# Patient Record
Sex: MALE | Race: WHITE | NOT HISPANIC OR LATINO
[De-identification: names, ages, dates, MRNs, and addresses within clinical notes are randomized per-mention and may not be internally consistent; named-entity substitution may affect disease eponyms.]

---

## 2017-01-17 ENCOUNTER — APPOINTMENT (OUTPATIENT)
Dept: GASTROENTEROLOGY | Facility: HOSPITAL | Age: 62
End: 2017-01-17

## 2017-01-17 ENCOUNTER — OUTPATIENT (OUTPATIENT)
Dept: OUTPATIENT SERVICES | Facility: HOSPITAL | Age: 62
LOS: 1 days | End: 2017-01-17
Payer: SELF-PAY

## 2017-01-17 VITALS
HEIGHT: 66 IN | HEART RATE: 58 BPM | RESPIRATION RATE: 14 BRPM | WEIGHT: 170 LBS | DIASTOLIC BLOOD PRESSURE: 89 MMHG | BODY MASS INDEX: 27.32 KG/M2 | SYSTOLIC BLOOD PRESSURE: 137 MMHG

## 2017-01-17 DIAGNOSIS — B18.1 CHRONIC VIRAL HEPATITIS B WITHOUT DELTA-AGENT: ICD-10-CM

## 2017-01-17 DIAGNOSIS — I85.00 ESOPHAGEAL VARICES WITHOUT BLEEDING: ICD-10-CM

## 2017-01-17 DIAGNOSIS — K21.9 GASTRO-ESOPHAGEAL REFLUX DISEASE WITHOUT ESOPHAGITIS: ICD-10-CM

## 2017-01-17 DIAGNOSIS — K74.60 UNSPECIFIED CIRRHOSIS OF LIVER: ICD-10-CM

## 2017-01-17 DIAGNOSIS — R10.9 UNSPECIFIED ABDOMINAL PAIN: ICD-10-CM

## 2017-01-17 PROCEDURE — G0463: CPT

## 2017-06-27 ENCOUNTER — OUTPATIENT (OUTPATIENT)
Dept: OUTPATIENT SERVICES | Facility: HOSPITAL | Age: 62
LOS: 1 days | End: 2017-06-27
Payer: SELF-PAY

## 2017-06-27 ENCOUNTER — APPOINTMENT (OUTPATIENT)
Dept: GASTROENTEROLOGY | Facility: HOSPITAL | Age: 62
End: 2017-06-27

## 2017-06-27 VITALS
WEIGHT: 170 LBS | HEIGHT: 66 IN | BODY MASS INDEX: 27.32 KG/M2 | DIASTOLIC BLOOD PRESSURE: 89 MMHG | HEART RATE: 52 BPM | SYSTOLIC BLOOD PRESSURE: 139 MMHG

## 2017-06-27 DIAGNOSIS — Z80.0 FAMILY HISTORY OF MALIGNANT NEOPLASM OF DIGESTIVE ORGANS: ICD-10-CM

## 2017-06-27 DIAGNOSIS — K76.9 LIVER DISEASE, UNSPECIFIED: ICD-10-CM

## 2017-06-27 PROCEDURE — G0463: CPT

## 2017-06-28 DIAGNOSIS — B18.1 CHRONIC VIRAL HEPATITIS B WITHOUT DELTA-AGENT: ICD-10-CM

## 2017-06-28 DIAGNOSIS — K74.60 UNSPECIFIED CIRRHOSIS OF LIVER: ICD-10-CM

## 2017-06-28 DIAGNOSIS — I85.00 ESOPHAGEAL VARICES WITHOUT BLEEDING: ICD-10-CM

## 2017-06-28 DIAGNOSIS — E66.3 OVERWEIGHT: ICD-10-CM

## 2017-10-12 ENCOUNTER — TRANSCRIPTION ENCOUNTER (OUTPATIENT)
Age: 62
End: 2017-10-12

## 2017-11-06 ENCOUNTER — MEDICATION RENEWAL (OUTPATIENT)
Age: 62
End: 2017-11-06

## 2017-11-28 ENCOUNTER — APPOINTMENT (OUTPATIENT)
Dept: INTERNAL MEDICINE | Facility: CLINIC | Age: 62
End: 2017-11-28
Payer: COMMERCIAL

## 2017-11-28 VITALS
OXYGEN SATURATION: 98 % | HEIGHT: 68 IN | BODY MASS INDEX: 26.07 KG/M2 | HEART RATE: 94 BPM | WEIGHT: 172 LBS | SYSTOLIC BLOOD PRESSURE: 140 MMHG | DIASTOLIC BLOOD PRESSURE: 80 MMHG

## 2017-11-28 DIAGNOSIS — M54.5 LOW BACK PAIN: ICD-10-CM

## 2017-11-28 DIAGNOSIS — M79.673 PAIN IN UNSPECIFIED FOOT: ICD-10-CM

## 2017-11-28 DIAGNOSIS — G89.29 LOW BACK PAIN: ICD-10-CM

## 2017-11-28 DIAGNOSIS — Z87.898 PERSONAL HISTORY OF OTHER SPECIFIED CONDITIONS: ICD-10-CM

## 2017-11-28 PROCEDURE — G0008: CPT

## 2017-11-28 PROCEDURE — 90688 IIV4 VACCINE SPLT 0.5 ML IM: CPT

## 2017-11-28 PROCEDURE — 99396 PREV VISIT EST AGE 40-64: CPT | Mod: 25

## 2017-12-01 LAB
25(OH)D3 SERPL-MCNC: 25.9 NG/ML
ALP BLD-CCNC: 53 U/L
ALT SERPL-CCNC: 49 U/L
ANION GAP SERPL CALC-SCNC: 13 MMOL/L
APPEARANCE: CLEAR
AST SERPL-CCNC: 30 U/L
BASOPHILS # BLD AUTO: 0.01 K/UL
BASOPHILS NFR BLD AUTO: 0.2 %
BILIRUB SERPL-MCNC: 0.8 MG/DL
BILIRUBIN URINE: NEGATIVE
BLOOD URINE: NEGATIVE
BUN SERPL-MCNC: 25 MG/DL
CALCIUM SERPL-MCNC: 10.3 MG/DL
CHOLEST SERPL-MCNC: 178 MG/DL
CHOLEST/HDLC SERPL: 4 RATIO
CO2 SERPL-SCNC: 26 MMOL/L
COLOR: YELLOW
CREAT SERPL-MCNC: 1.07 MG/DL
EOSINOPHIL # BLD AUTO: 0.2 K/UL
EOSINOPHIL NFR BLD AUTO: 3.8 %
GLUCOSE QUALITATIVE U: NEGATIVE MG/DL
GLUCOSE SERPL-MCNC: 107 MG/DL
HCT VFR BLD CALC: 45.5 %
HDLC SERPL-MCNC: 44 MG/DL
HGB BLD-MCNC: 15.2 G/DL
HIV1+2 AB SPEC QL IA.RAPID: NONREACTIVE
IMM GRANULOCYTES NFR BLD AUTO: 0.2 %
KETONES URINE: NEGATIVE
LDLC SERPL CALC-MCNC: 106 MG/DL
LEUKOCYTE ESTERASE URINE: NEGATIVE
LYMPHOCYTES # BLD AUTO: 1.74 K/UL
LYMPHOCYTES NFR BLD AUTO: 33.1 %
MAN DIFF?: NORMAL
MCHC RBC-ENTMCNC: 30 PG
MCHC RBC-ENTMCNC: 33.4 GM/DL
MCV RBC AUTO: 89.9 FL
MONOCYTES # BLD AUTO: 0.46 K/UL
MONOCYTES NFR BLD AUTO: 8.7 %
NEUTROPHILS # BLD AUTO: 2.84 K/UL
NEUTROPHILS NFR BLD AUTO: 54 %
NITRITE URINE: NEGATIVE
PH URINE: 5
PLATELET # BLD AUTO: 122 K/UL
POTASSIUM SERPL-SCNC: 4.4 MMOL/L
PROT SERPL-MCNC: 7.7 G/DL
PROTEIN URINE: NEGATIVE MG/DL
PSA SERPL-MCNC: 0.93 NG/ML
RBC # BLD: 5.06 M/UL
RBC # FLD: 14.3 %
SODIUM SERPL-SCNC: 140 MMOL/L
SPECIFIC GRAVITY URINE: 1.02
T PALLIDUM AB SER QL IA: NEGATIVE
TRIGL SERPL-MCNC: 139 MG/DL
TSH SERPL-ACNC: 1.86 UIU/ML
UROBILINOGEN URINE: NEGATIVE MG/DL
WBC # FLD AUTO: 5.26 K/UL

## 2018-01-04 ENCOUNTER — APPOINTMENT (OUTPATIENT)
Dept: DERMATOLOGY | Facility: CLINIC | Age: 63
End: 2018-01-04

## 2018-01-23 ENCOUNTER — APPOINTMENT (OUTPATIENT)
Dept: GASTROENTEROLOGY | Facility: CLINIC | Age: 63
End: 2018-01-23
Payer: COMMERCIAL

## 2018-01-23 VITALS
TEMPERATURE: 97.7 F | RESPIRATION RATE: 14 BRPM | SYSTOLIC BLOOD PRESSURE: 118 MMHG | BODY MASS INDEX: 26.98 KG/M2 | HEIGHT: 68 IN | OXYGEN SATURATION: 98 % | HEART RATE: 60 BPM | DIASTOLIC BLOOD PRESSURE: 74 MMHG | WEIGHT: 178 LBS

## 2018-01-23 PROCEDURE — 99204 OFFICE O/P NEW MOD 45 MIN: CPT | Mod: 25

## 2018-01-23 PROCEDURE — 36415 COLL VENOUS BLD VENIPUNCTURE: CPT

## 2018-01-28 LAB
AFP-TM SERPL-MCNC: 2.3 NG/ML
ALBUMIN SERPL ELPH-MCNC: 4.4 G/DL
ALP BLD-CCNC: 55 U/L
ALT SERPL-CCNC: 36 U/L
AST SERPL-CCNC: 33 U/L
BASOPHILS # BLD AUTO: 0.01 K/UL
BASOPHILS NFR BLD AUTO: 0.2 %
BILIRUB DIRECT SERPL-MCNC: 0.1 MG/DL
BILIRUB INDIRECT SERPL-MCNC: 0.3 MG/DL
BILIRUB SERPL-MCNC: 0.4 MG/DL
CREAT SERPL-MCNC: 1.22 MG/DL
EOSINOPHIL # BLD AUTO: 0.2 K/UL
EOSINOPHIL NFR BLD AUTO: 3.6 %
HBV DNA # SERPL NAA+PROBE: NOT DETECTED IU/ML
HCT VFR BLD CALC: 46.3 %
HEPB DNA PCR LOG: NOT DETECTED LOGIU/ML
HGB BLD-MCNC: 15 G/DL
IMM GRANULOCYTES NFR BLD AUTO: 0.2 %
INR PPP: 1 RATIO
LYMPHOCYTES # BLD AUTO: 2.01 K/UL
LYMPHOCYTES NFR BLD AUTO: 36.3 %
MAN DIFF?: NORMAL
MCHC RBC-ENTMCNC: 29.8 PG
MCHC RBC-ENTMCNC: 32.4 GM/DL
MCV RBC AUTO: 91.9 FL
MONOCYTES # BLD AUTO: 0.44 K/UL
MONOCYTES NFR BLD AUTO: 8 %
NEUTROPHILS # BLD AUTO: 2.86 K/UL
NEUTROPHILS NFR BLD AUTO: 51.7 %
PLATELET # BLD AUTO: 127 K/UL
PROT SERPL-MCNC: 7.6 G/DL
PT BLD: 11.3 SEC
RBC # BLD: 5.04 M/UL
RBC # FLD: 14.2 %
SODIUM SERPL-SCNC: 140 MMOL/L
WBC # FLD AUTO: 5.53 K/UL

## 2018-02-08 ENCOUNTER — APPOINTMENT (OUTPATIENT)
Dept: PULMONOLOGY | Facility: CLINIC | Age: 63
End: 2018-02-08

## 2018-03-19 ENCOUNTER — RX RENEWAL (OUTPATIENT)
Age: 63
End: 2018-03-19

## 2018-07-27 PROBLEM — Z80.0 FAMILY HISTORY OF COLON CANCER: Status: INACTIVE | Noted: 2017-06-27

## 2018-10-31 ENCOUNTER — APPOINTMENT (OUTPATIENT)
Dept: INTERNAL MEDICINE | Facility: CLINIC | Age: 63
End: 2018-10-31
Payer: COMMERCIAL

## 2018-10-31 VITALS
BODY MASS INDEX: 28.09 KG/M2 | HEIGHT: 67 IN | WEIGHT: 179 LBS | OXYGEN SATURATION: 98 % | SYSTOLIC BLOOD PRESSURE: 114 MMHG | DIASTOLIC BLOOD PRESSURE: 80 MMHG

## 2018-10-31 PROCEDURE — G0009: CPT

## 2018-10-31 PROCEDURE — 99397 PER PM REEVAL EST PAT 65+ YR: CPT | Mod: 25

## 2018-10-31 PROCEDURE — 99213 OFFICE O/P EST LOW 20 MIN: CPT | Mod: 25

## 2018-10-31 PROCEDURE — G0444 DEPRESSION SCREEN ANNUAL: CPT

## 2018-10-31 PROCEDURE — 90670 PCV13 VACCINE IM: CPT

## 2018-10-31 NOTE — HISTORY OF PRESENT ILLNESS
[FreeTextEntry1] : CPE [de-identified] : \par Pt would like a CPE and ask for Ophthalmology and ENT.\par \par Patient also hurt his right wrist working last week. He has pain around the thumb.\par \par

## 2018-10-31 NOTE — PHYSICAL EXAM
[No Acute Distress] : no acute distress [Well Developed] : well developed [Well-Appearing] : well-appearing [Normal Sclera/Conjunctiva] : normal sclera/conjunctiva [PERRL] : pupils equal round and reactive to light [EOMI] : extraocular movements intact [Normal Oropharynx] : the oropharynx was normal [Supple] : supple [No Lymphadenopathy] : no lymphadenopathy [No Respiratory Distress] : no respiratory distress  [Clear to Auscultation] : lungs were clear to auscultation bilaterally [No Accessory Muscle Use] : no accessory muscle use [Normal Rate] : normal rate  [Regular Rhythm] : with a regular rhythm [Normal S1, S2] : normal S1 and S2 [No Murmur] : no murmur heard [No Carotid Bruits] : no carotid bruits [No Edema] : there was no peripheral edema [Normal Appearance] : normal in appearance [No Masses] : no palpable masses [No Nipple Discharge] : no nipple discharge [No Axillary Lymphadenopathy] : no axillary lymphadenopathy [Soft] : abdomen soft [Non Tender] : non-tender [No HSM] : no HSM [Normal Bowel Sounds] : normal bowel sounds [Normal Supraclavicular Nodes] : no supraclavicular lymphadenopathy [Normal Axillary Nodes] : no axillary lymphadenopathy [Normal Posterior Cervical Nodes] : no posterior cervical lymphadenopathy [Normal Anterior Cervical Nodes] : no anterior cervical lymphadenopathy [No Spinal Tenderness] : no spinal tenderness [No Joint Swelling] : no joint swelling [No Focal Deficits] : no focal deficits [Speech Grossly Normal] : speech grossly normal [Normal Affect] : the affect was normal [Alert and Oriented x3] : oriented to person, place, and time [Normal Mood] : the mood was normal [Normal Insight/Judgement] : insight and judgment were intact [Acne] : no acne [de-identified] : No calf tenderness bilaterally. Radial pulses +2 bilaterally  [de-identified] : no pain to palpation over the lower back where pt has pain [de-identified] : RIGHT wrist, normal ROM and strength, no joint swelling or erythema noted [de-identified] : no skin changes on the back, mild excoriation on right thumb which is not infected, right wrist bandaged here

## 2018-10-31 NOTE — ASSESSMENT
[FreeTextEntry1] : # RIGHT wrist sprain and chronic low back pain - advised pt he is overworking and should try to cut back on his work reasonably. advised power walking to improve core strength and provided pt with a wrist splint today for PRN use\par \par HCM - preventive topics d/w pt\par -check screening labs\par -pt did his flu vaccine already and is due for Prevnar which he is agreeable to\par \par Advised the patient today to return to the office within 6 months or sooner as needed for the next visit and that the patient may see any doctor here if I am unavailable for any reason.\par

## 2018-10-31 NOTE — REVIEW OF SYSTEMS
[Back Pain] : back pain [Fever] : no fever [Pain] : no pain [Chest Pain] : no chest pain [Dyspnea on Exertion] : not dyspnea on exertion [Abdominal Pain] : no abdominal pain [Melena] : no melena [Dysuria] : no dysuria [Hematuria] : no hematuria [Skin Rash] : no skin rash [Fainting] : no fainting [Depression] : no depression [Easy Bleeding] : no easy bleeding [FreeTextEntry8] : no urinary symptoms

## 2018-10-31 NOTE — COUNSELING
[Weight management counseling provided] : Weight management [Healthy eating counseling provided] : healthy eating [Activity counseling provided] : activity [None] : None [Good understanding] : Patient has a good understanding of lifestyle changes and the steps needed to achieve self management goals [ - Annual Depression Screening] : Annual Depression Screening [de-identified] : pt counseled on eating a healthy diet, exercise, avoidance of tobacco and alcohol, avoidance of UV light/sunscreen use, safe sex habits/STI prevention, pt agreeable to HIV/STI screening which was offered today\par \par HCM - preventive topics d/w pt. pt agreeable to HIV screening which was offered today. \par \par Pt advised to call us/come here for a sick visit/or go to urgent care if he is ill. pt advised to go to ED if emergent issues. pt is to call us to discuss results of all lab testing and to discuss any appropriate followup.Pt advised to alert us if he does not receive a phone call or letter with lab test results within 10 days  of today's visit.\par

## 2018-10-31 NOTE — HEALTH RISK ASSESSMENT
[No falls in past year] : Patient reported no falls in the past year [0] : 2) Feeling down, depressed, or hopeless: Not at all (0) [Patient reported colonoscopy was normal] : Patient reported colonoscopy was normal [With Significant Other] : lives with significant other [] :  [Fully functional (bathing, dressing, toileting, transferring, walking, feeding)] : Fully functional (bathing, dressing, toileting, transferring, walking, feeding) [Fully functional (using the telephone, shopping, preparing meals, housekeeping, doing laundry, using] : Fully functional and needs no help or supervision to perform IADLs (using the telephone, shopping, preparing meals, housekeeping, doing laundry, using transportation, managing medications and managing finances) [Reports changes in hearing] : Reports changes in hearing [Reports changes in vision] : Reports changes in vision [Smoke Detector] : smoke detector [Carbon Monoxide Detector] : carbon monoxide detector [Seat Belt] :  uses seat belt [Sunscreen] : uses sunscreen [] : No [[id number]] : 0 [High Risk Behavior] : no high risk behavior [Reports changes in dental health] : Reports no changes in dental health [ColonoscopyDate] : 01/11 [ColonoscopyComments] : pt is aware repeat is due in 2021 [HIVDate] : 11/17 [de-identified] : pt has dentures, advised to keep up with routine dental followup

## 2018-11-02 LAB
25(OH)D3 SERPL-MCNC: 23.6 NG/ML
ALBUMIN SERPL ELPH-MCNC: 4.7 G/DL
ALP BLD-CCNC: 57 U/L
ALT SERPL-CCNC: 48 U/L
ANION GAP SERPL CALC-SCNC: 13 MMOL/L
APPEARANCE: CLEAR
AST SERPL-CCNC: 30 U/L
BASOPHILS # BLD AUTO: 0.01 K/UL
BASOPHILS NFR BLD AUTO: 0.2 %
BILIRUB SERPL-MCNC: 0.8 MG/DL
BILIRUBIN URINE: NEGATIVE
BLOOD URINE: NEGATIVE
BUN SERPL-MCNC: 15 MG/DL
CALCIUM SERPL-MCNC: 9.8 MG/DL
CHLORIDE SERPL-SCNC: 99 MMOL/L
CHOLEST SERPL-MCNC: 171 MG/DL
CHOLEST/HDLC SERPL: 4.6 RATIO
CO2 SERPL-SCNC: 27 MMOL/L
COLOR: YELLOW
CREAT SERPL-MCNC: 1.02 MG/DL
EOSINOPHIL # BLD AUTO: 0.23 K/UL
EOSINOPHIL NFR BLD AUTO: 4.3 %
FOLATE SERPL-MCNC: 15.3 NG/ML
GLUCOSE QUALITATIVE U: NEGATIVE MG/DL
GLUCOSE SERPL-MCNC: 103 MG/DL
HCT VFR BLD CALC: 46.4 %
HCV AB SER QL: NONREACTIVE
HCV S/CO RATIO: 0.07 S/CO
HDLC SERPL-MCNC: 37 MG/DL
HGB BLD-MCNC: 15.3 G/DL
IMM GRANULOCYTES NFR BLD AUTO: 0.2 %
KETONES URINE: NEGATIVE
LDLC SERPL CALC-MCNC: 101 MG/DL
LEUKOCYTE ESTERASE URINE: NEGATIVE
LYMPHOCYTES # BLD AUTO: 2.01 K/UL
LYMPHOCYTES NFR BLD AUTO: 37.3 %
MAN DIFF?: NORMAL
MCHC RBC-ENTMCNC: 29.9 PG
MCHC RBC-ENTMCNC: 33 GM/DL
MCV RBC AUTO: 90.8 FL
MONOCYTES # BLD AUTO: 0.41 K/UL
MONOCYTES NFR BLD AUTO: 7.6 %
NEUTROPHILS # BLD AUTO: 2.72 K/UL
NEUTROPHILS NFR BLD AUTO: 50.4 %
NITRITE URINE: NEGATIVE
PH URINE: 5
PLATELET # BLD AUTO: 135 K/UL
POTASSIUM SERPL-SCNC: 4.8 MMOL/L
PROT SERPL-MCNC: 7.2 G/DL
PROTEIN URINE: NEGATIVE MG/DL
PSA SERPL-MCNC: 0.81 NG/ML
RBC # BLD: 5.11 M/UL
RBC # FLD: 13.9 %
SODIUM SERPL-SCNC: 139 MMOL/L
SPECIFIC GRAVITY URINE: 1.02
TRIGL SERPL-MCNC: 165 MG/DL
TSH SERPL-ACNC: 2.02 UIU/ML
UROBILINOGEN URINE: NEGATIVE MG/DL
VIT B12 SERPL-MCNC: 304 PG/ML
WBC # FLD AUTO: 5.39 K/UL

## 2018-11-15 ENCOUNTER — APPOINTMENT (OUTPATIENT)
Dept: ULTRASOUND IMAGING | Facility: CLINIC | Age: 63
End: 2018-11-15
Payer: COMMERCIAL

## 2018-11-15 ENCOUNTER — OUTPATIENT (OUTPATIENT)
Dept: OUTPATIENT SERVICES | Facility: HOSPITAL | Age: 63
LOS: 1 days | End: 2018-11-15
Payer: COMMERCIAL

## 2018-11-15 DIAGNOSIS — K74.60 UNSPECIFIED CIRRHOSIS OF LIVER: ICD-10-CM

## 2018-11-15 PROCEDURE — 76700 US EXAM ABDOM COMPLETE: CPT

## 2018-11-15 PROCEDURE — 76700 US EXAM ABDOM COMPLETE: CPT | Mod: 26

## 2018-12-11 ENCOUNTER — APPOINTMENT (OUTPATIENT)
Dept: OTOLARYNGOLOGY | Facility: CLINIC | Age: 63
End: 2018-12-11
Payer: COMMERCIAL

## 2018-12-11 VITALS
HEART RATE: 60 BPM | HEIGHT: 67 IN | BODY MASS INDEX: 28.09 KG/M2 | DIASTOLIC BLOOD PRESSURE: 76 MMHG | WEIGHT: 179 LBS | SYSTOLIC BLOOD PRESSURE: 113 MMHG

## 2018-12-11 DIAGNOSIS — H90.41 SENSORINEURAL HEARING LOSS, UNILATERAL, RIGHT EAR, WITH UNRESTRICTED HEARING ON THE CONTRALATERAL SIDE: ICD-10-CM

## 2018-12-11 PROCEDURE — 92567 TYMPANOMETRY: CPT

## 2018-12-11 PROCEDURE — 92557 COMPREHENSIVE HEARING TEST: CPT

## 2018-12-11 PROCEDURE — G0268 REMOVAL OF IMPACTED WAX MD: CPT

## 2018-12-11 PROCEDURE — 99204 OFFICE O/P NEW MOD 45 MIN: CPT | Mod: 25

## 2019-02-05 ENCOUNTER — APPOINTMENT (OUTPATIENT)
Dept: HEPATOLOGY | Facility: CLINIC | Age: 64
End: 2019-02-05
Payer: MEDICARE

## 2019-02-05 VITALS
HEART RATE: 60 BPM | SYSTOLIC BLOOD PRESSURE: 125 MMHG | BODY MASS INDEX: 27.62 KG/M2 | RESPIRATION RATE: 17 BRPM | DIASTOLIC BLOOD PRESSURE: 85 MMHG | TEMPERATURE: 97.8 F | WEIGHT: 176 LBS | HEIGHT: 67 IN

## 2019-02-05 PROCEDURE — 99204 OFFICE O/P NEW MOD 45 MIN: CPT

## 2019-02-05 NOTE — ASSESSMENT
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

## 2019-02-05 NOTE — PHYSICAL EXAM
[Scleral Icterus] : No Scleral Icterus [Abdominal  Ascites] : no ascites [Ascites Fluid Wave] : no ascites fluid wave [Splenomegaly] : no splenomegaly [Liver Palpable ___ Finger Breadths Below Costal Margin] : Liver edge was palpable [unfilled] finger breadths below costal margin [Asterixis] : no asterixis observed [Jaundice] : No jaundice [Palmar Erythema] : Palmar Erythema [Depression] : no depression [General Appearance - Alert] : alert [General Appearance - In No Acute Distress] : in no acute distress [Sclera] : the sclera and conjunctiva were normal [PERRL With Normal Accommodation] : pupils were equal in size, round, and reactive to light [Extraocular Movements] : extraocular movements were intact [Outer Ear] : the ears and nose were normal in appearance [Oropharynx] : the oropharynx was normal [Neck Appearance] : the appearance of the neck was normal [Neck Cervical Mass (___cm)] : no neck mass was observed [Jugular Venous Distention Increased] : there was no jugular-venous distention [Thyroid Diffuse Enlargement] : the thyroid was not enlarged [Thyroid Nodule] : there were no palpable thyroid nodules [Auscultation Breath Sounds / Voice Sounds] : lungs were clear to auscultation bilaterally [Full Pulse] : the pedal pulses are present [Edema] : there was no peripheral edema [Breast Appearance] : normal in appearance [Breast Palpation Mass] : no palpable masses [Bowel Sounds] : normal bowel sounds [Abdomen Soft] : soft [Abdomen Tenderness] : non-tender [Abdomen Mass (___ Cm)] : no abdominal mass palpated [FreeTextEntry1] : liver indurated 3 fingers below costal margin [Cervical Lymph Nodes Enlarged Posterior Bilaterally] : posterior cervical [Cervical Lymph Nodes Enlarged Anterior Bilaterally] : anterior cervical [Supraclavicular Lymph Nodes Enlarged Bilaterally] : supraclavicular [Axillary Lymph Nodes Enlarged Bilaterally] : axillary [Femoral Lymph Nodes Enlarged Bilaterally] : femoral [Inguinal Lymph Nodes Enlarged Bilaterally] : inguinal [No CVA Tenderness] : no ~M costovertebral angle tenderness [No Spinal Tenderness] : no spinal tenderness [Abnormal Walk] : normal gait [Nail Clubbing] : no clubbing  or cyanosis of the fingernails [Musculoskeletal - Swelling] : no joint swelling seen [Motor Tone] : muscle strength and tone were normal [Skin Color & Pigmentation] : normal skin color and pigmentation [Skin Turgor] : normal skin turgor [] : no rash [Deep Tendon Reflexes (DTR)] : deep tendon reflexes were 2+ and symmetric [Sensation] : the sensory exam was normal to light touch and pinprick [No Focal Deficits] : no focal deficits [Oriented To Time, Place, And Person] : oriented to person, place, and time [Impaired Insight] : insight and judgment were intact [Affect] : the affect was normal

## 2019-02-05 NOTE — HISTORY OF PRESENT ILLNESS
[Fatigue] : denies fatigue [Malaise] : denies malaise [Fever] : denies fever [Diffuse Joint Pain (Arthralgias)] : denies arthralgias [Muscle Aches, Generalized (Myalgias)] : denies myalgias [Yellow Skin Or Eyes (Jaundice)] : denies jaundice [Abdominal Pain] : denies abdominal pain [Urine Tests Nonspecific Abnormal Findings Biliuria] : denies dark urine [Light Colored Bowel Movement (Acholic Stools)] : denies pale stools [Insomnia] : denies insomnia [Skin: Rash] : denies rash [Itching (Pruritus)] : denies pruritus [Shortness Of Breath] : denies shortness of breath [Needlestick Exposure] : no needlestick exposure [Infected Sexual Partner] : no infected sexual partner [IV Drug Use] : no IV drug use [Tattoo] : no tattoos [Body Piercing] : no body piercing [Hemodialysis] : no hemodialysis [Transfusion before 1992] : no transfusion before 1992 [Transplant before 1992] : no transplant before 1992 [Incarceration] : no incarceration [Alcohol Abuse] : no alcohol abuse [Autoimmune Disorder] : no autoimmune disorder [Household Contact to HBV] : no household contact to HBV [Travel to Endemic Area] : no travel to an endemic area [Occupational Exposure] : no occupational exposure [Cocaine Use] : no cocaine use [Wt Gain ___ Lbs] : no recent weight gain [Wt Loss ___ Lbs] : no recent weight loss [de-identified] : Mr. KNOX is a 63 year old man with chronic hepatitis B (dx. 2004), on Viread since diagnosis, compensated cirrhosis, small varices that never bled, on nadolol 20 mg/d, GERD, overweight BMI 27, 176 lbs.\par \par weight: never signif. heavier than now - overweight. Alcohol: social use. Never more than 1 drink per day.\par Colonoscopy: normal in 2011

## 2019-03-29 ENCOUNTER — RX RENEWAL (OUTPATIENT)
Age: 64
End: 2019-03-29

## 2019-05-16 LAB
AFP-TM SERPL-MCNC: <1.8 NG/ML
ALBUMIN SERPL ELPH-MCNC: 4.5 G/DL
ALP BLD-CCNC: 55 U/L
ALT SERPL-CCNC: 40 U/L
ANION GAP SERPL CALC-SCNC: 13 MMOL/L
AST SERPL-CCNC: 30 U/L
BASOPHILS # BLD AUTO: 0.02 K/UL
BASOPHILS NFR BLD AUTO: 0.4 %
BILIRUB SERPL-MCNC: 0.7 MG/DL
BUN SERPL-MCNC: 16 MG/DL
CALCIUM SERPL-MCNC: 9.7 MG/DL
CHLORIDE SERPL-SCNC: 103 MMOL/L
CO2 SERPL-SCNC: 24 MMOL/L
CREAT SERPL-MCNC: 1 MG/DL
EOSINOPHIL # BLD AUTO: 0.19 K/UL
EOSINOPHIL NFR BLD AUTO: 3.9 %
GLUCOSE SERPL-MCNC: 115 MG/DL
HBV E AG SER QL: NEGATIVE
HBV SURFACE AB SER QL: NONREACTIVE
HCT VFR BLD CALC: 46.5 %
HEPATITIS A IGG ANTIBODY: REACTIVE
HGB BLD-MCNC: 15.5 G/DL
IMM GRANULOCYTES NFR BLD AUTO: 0.2 %
INR PPP: 1.08 RATIO
LYMPHOCYTES # BLD AUTO: 1.58 K/UL
LYMPHOCYTES NFR BLD AUTO: 32.7 %
MAN DIFF?: NORMAL
MCHC RBC-ENTMCNC: 28.8 PG
MCHC RBC-ENTMCNC: 33.3 GM/DL
MCV RBC AUTO: 86.4 FL
MONOCYTES # BLD AUTO: 0.37 K/UL
MONOCYTES NFR BLD AUTO: 7.7 %
NEUTROPHILS # BLD AUTO: 2.66 K/UL
NEUTROPHILS NFR BLD AUTO: 55.1 %
PLATELET # BLD AUTO: 121 K/UL
POTASSIUM SERPL-SCNC: 4.7 MMOL/L
PROT SERPL-MCNC: 7.1 G/DL
PT BLD: 12.4 SEC
RBC # BLD: 5.38 M/UL
RBC # FLD: 13.2 %
SODIUM SERPL-SCNC: 140 MMOL/L
WBC # FLD AUTO: 4.83 K/UL

## 2019-05-17 LAB
HBV DNA # SERPL NAA+PROBE: NOT DETECTED
HBV E AB SER QL: POSITIVE
HEPB DNA PCR LOG: NOT DETECTED LOGIU/ML

## 2019-05-20 ENCOUNTER — RX RENEWAL (OUTPATIENT)
Age: 64
End: 2019-05-20

## 2019-05-22 ENCOUNTER — OUTPATIENT (OUTPATIENT)
Dept: OUTPATIENT SERVICES | Facility: HOSPITAL | Age: 64
LOS: 1 days | Discharge: HOME | End: 2019-05-22
Payer: MEDICARE

## 2019-05-22 ENCOUNTER — RESULT REVIEW (OUTPATIENT)
Age: 64
End: 2019-05-22

## 2019-05-22 ENCOUNTER — APPOINTMENT (OUTPATIENT)
Dept: HEPATOLOGY | Facility: HOSPITAL | Age: 64
End: 2019-05-22

## 2019-05-22 DIAGNOSIS — I85.00 ESOPHAGEAL VARICES WITHOUT BLEEDING: ICD-10-CM

## 2019-05-22 PROCEDURE — 88312 SPECIAL STAINS GROUP 1: CPT | Mod: 26

## 2019-05-22 PROCEDURE — 43239 EGD BIOPSY SINGLE/MULTIPLE: CPT

## 2019-05-22 PROCEDURE — 88305 TISSUE EXAM BY PATHOLOGIST: CPT | Mod: 26

## 2019-05-22 RX ORDER — OMEPRAZOLE 20 MG/1
20 CAPSULE, DELAYED RELEASE ORAL
Qty: 30 | Refills: 2 | Status: DISCONTINUED | COMMUNITY
Start: 2019-02-05 | End: 2019-05-22

## 2019-05-22 NOTE — ASSESSMENT
[FreeTextEntry1] : - 5/22/19 EGD: medium-sized varices, not banded. LA-grade C reflux esophagitis. Two gastric inlet patches in the upper esophagus. Mild erosive gastritis, biopsied. Started Omeprazole 40 mg/d. Given bradycardia, switched nadolol 20 mg/d to carvedilol - start 6.25 mg bid, increase to 12.5 mg bid after 3 days if tolerated. F/u path.

## 2019-06-03 ENCOUNTER — APPOINTMENT (OUTPATIENT)
Dept: HEPATOLOGY | Facility: CLINIC | Age: 64
End: 2019-06-03
Payer: MEDICARE

## 2019-06-03 VITALS
HEIGHT: 67 IN | WEIGHT: 174 LBS | DIASTOLIC BLOOD PRESSURE: 83 MMHG | TEMPERATURE: 97.5 F | BODY MASS INDEX: 27.31 KG/M2 | SYSTOLIC BLOOD PRESSURE: 126 MMHG | HEART RATE: 58 BPM

## 2019-06-03 PROCEDURE — 99214 OFFICE O/P EST MOD 30 MIN: CPT

## 2019-06-03 NOTE — HISTORY OF PRESENT ILLNESS
[Malaise] : denies malaise [Fatigue] : denies fatigue [Fever] : denies fever [Diffuse Joint Pain (Arthralgias)] : denies arthralgias [Muscle Aches, Generalized (Myalgias)] : denies myalgias [Abdominal Pain] : denies abdominal pain [Yellow Skin Or Eyes (Jaundice)] : denies jaundice [Insomnia] : denies insomnia [Urine Tests Nonspecific Abnormal Findings Biliuria] : denies dark urine [Light Colored Bowel Movement (Acholic Stools)] : denies pale stools [Skin: Rash] : denies rash [Itching (Pruritus)] : denies pruritus [Shortness Of Breath] : denies shortness of breath [Needlestick Exposure] : no needlestick exposure [Infected Sexual Partner] : no infected sexual partner [Tattoo] : no tattoos [IV Drug Use] : no IV drug use [Hemodialysis] : no hemodialysis [Body Piercing] : no body piercing [Transfusion before 1992] : no transfusion before 1992 [Transplant before 1992] : no transplant before 1992 [Alcohol Abuse] : no alcohol abuse [Incarceration] : no incarceration [Autoimmune Disorder] : no autoimmune disorder [Household Contact to HBV] : no household contact to HBV [Travel to Endemic Area] : no travel to an endemic area [Occupational Exposure] : no occupational exposure [Wt Gain ___ Lbs] : no recent weight gain [Cocaine Use] : no cocaine use [Wt Loss ___ Lbs] : no recent weight loss [de-identified] : Mr. KNOX is a 63 year old man with chronic hepatitis B (dx. 2004), on Viread since diagnosis, compensated cirrhosis, small varices that never bled, on nadolol 20 mg/d, GERD, overweight BMI 27, 176 lbs. [facility] in [facility] past and saw Dr. De La Torre once, but he and his wife do not remember that visit and want to follow-up in our liver center.\par \par weight: never signif. heavier than now - overweight. Alcohol: social use. Never more than 1 drink per day.\par \par - 6/3/19: here after EGD which showed large esophageal varices. Tolerating carvedilol 12.5 mg bid - I switched from nadolol b/o bradycardia on sub-obtimal dose. Today HR in the 50s as always. Feels very energetic - built the handrails of stairs and porch around his house in 3 days, but has longstanding sleep problems at night. Did not get CT done although approved after peer review. \par \par Workup:\par - 5/22/19 EGD: medium-sized varices, not banded. LA-grade C reflux esophagitis. Two gastric inlet patches in the upper esophagus. Mild erosive gastritis, biopsied. Started Omeprazole 40 mg/d. Given bradycardia, switched nadolol 20 mg/d to carvedilol - start 6.25 mg bid, increase to 12.5 mg bid after 3 days if tolerated. Path: nonspecific gastritis, H. pylori negative.\par - 11/2016 MRI abdomen: Cirrhosis. 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 The remaining vessels are patent. No significant varices. MPRESSION: Cirrhosis. No evidence of hepatoma\par - Colonoscopy: normal in 2011

## 2019-06-03 NOTE — ASSESSMENT
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

## 2019-06-13 ENCOUNTER — LABORATORY RESULT (OUTPATIENT)
Age: 64
End: 2019-06-13

## 2019-06-26 RX ORDER — CARVEDILOL 12.5 MG/1
12.5 TABLET, FILM COATED ORAL TWICE DAILY
Qty: 60 | Refills: 5 | Status: DISCONTINUED | COMMUNITY
Start: 2019-05-22 | End: 2019-06-26

## 2019-06-27 ENCOUNTER — RX RENEWAL (OUTPATIENT)
Age: 64
End: 2019-06-27

## 2019-07-15 ENCOUNTER — APPOINTMENT (OUTPATIENT)
Dept: HEPATOLOGY | Facility: CLINIC | Age: 64
End: 2019-07-15
Payer: MEDICARE

## 2019-07-15 VITALS
RESPIRATION RATE: 17 BRPM | HEIGHT: 67 IN | HEART RATE: 49 BPM | TEMPERATURE: 98 F | WEIGHT: 170 LBS | SYSTOLIC BLOOD PRESSURE: 112 MMHG | DIASTOLIC BLOOD PRESSURE: 72 MMHG | BODY MASS INDEX: 26.68 KG/M2

## 2019-07-15 PROCEDURE — 99214 OFFICE O/P EST MOD 30 MIN: CPT

## 2019-07-15 NOTE — HISTORY OF PRESENT ILLNESS
[Fatigue] : denies fatigue [Malaise] : denies malaise [Fever] : denies fever [Diffuse Joint Pain (Arthralgias)] : denies arthralgias [Muscle Aches, Generalized (Myalgias)] : denies myalgias [Yellow Skin Or Eyes (Jaundice)] : denies jaundice [Abdominal Pain] : denies abdominal pain [Urine Tests Nonspecific Abnormal Findings Biliuria] : denies dark urine [Light Colored Bowel Movement (Acholic Stools)] : denies pale stools [Insomnia] : denies insomnia [Skin: Rash] : denies rash [Itching (Pruritus)] : denies pruritus [Shortness Of Breath] : denies shortness of breath [Needlestick Exposure] : no needlestick exposure [Infected Sexual Partner] : no infected sexual partner [IV Drug Use] : no IV drug use [Tattoo] : no tattoos [Body Piercing] : no body piercing [Hemodialysis] : no hemodialysis [Transfusion before 1992] : no transfusion before 1992 [Transplant before 1992] : no transplant before 1992 [Incarceration] : no incarceration [Alcohol Abuse] : no alcohol abuse [Autoimmune Disorder] : no autoimmune disorder [Household Contact to HBV] : no household contact to HBV [Travel to Endemic Area] : no travel to an endemic area [Occupational Exposure] : no occupational exposure [Cocaine Use] : no cocaine use [Wt Gain ___ Lbs] : no recent weight gain [Wt Loss ___ Lbs] : no recent weight loss [de-identified] : Mr. KNOX is a 63 year old man with chronic hepatitis B (dx. 2004), on Viread since diagnosis, compensated cirrhosis, small varices that never bled, on nadolol 20 mg/d, GERD, overweight BMI 27, 176 lbs. \par \par weight: never signif. heavier than now - overweight. Alcohol: social use. Never more than 1 drink per day.\par \par - 6/3/19: here after EGD which showed large esophageal varices. Tolerating carvedilol 12.5 mg bid - I switched from nadolol b/o bradycardia on sub-obtimal dose. Today HR in the 50s as always. Feels very energetic - built the handrails of stairs and porch around his house in 3 days, but has longstanding sleep problems at night. \par \par - 7/15/19: doing well, tolerating carvedilol 25 mg bid, but HR 49 and BP sometimes 90s at home - got dizzy at home after getting up quickly; also taking omeprazole for GERD seen on EGD 5/2019.  CT 2/2019 report reviewed with patient.\par \par Workup:\par - 2/25/19 CT abdomen wwo (scanned): cirrhosis, small probable hemangioma seg IVb. SMA dissection several cm. Atherosclerotic changes. No comparison possible.\par - 5/22/19 EGD: medium-sized varices, not banded. LA-grade C reflux esophagitis. Two gastric inlet patches in the upper esophagus. Mild erosive gastritis, biopsied. Started Omeprazole 40 mg/d. Given bradycardia, switched nadolol 20 mg/d to carvedilol - start 6.25 mg bid, increase to 12.5 mg bid after 3 days if tolerated. Path: nonspecific gastritis, H. pylori negative.\par - 11/2016 MRI abdomen: Cirrhosis. 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 The remaining vessels are patent. No significant varices. IMPRESSION: Cirrhosis. No evidence of hepatoma\par - Colonoscopy: normal in 2011

## 2019-09-11 LAB
ALBUMIN SERPL ELPH-MCNC: 4.4 G/DL
ALP BLD-CCNC: 57 U/L
ALT SERPL-CCNC: 29 U/L
ANION GAP SERPL CALC-SCNC: 14 MMOL/L
AST SERPL-CCNC: 23 U/L
BILIRUB SERPL-MCNC: 0.7 MG/DL
BUN SERPL-MCNC: 18 MG/DL
CALCIUM SERPL-MCNC: 10 MG/DL
CHLORIDE SERPL-SCNC: 103 MMOL/L
CO2 SERPL-SCNC: 24 MMOL/L
CREAT SERPL-MCNC: 1.05 MG/DL
GLUCOSE SERPL-MCNC: 106 MG/DL
INR PPP: 1.1 RATIO
POTASSIUM SERPL-SCNC: 4.5 MMOL/L
PROT SERPL-MCNC: 7 G/DL
PT BLD: 12.5 SEC
SODIUM SERPL-SCNC: 141 MMOL/L

## 2019-09-25 ENCOUNTER — RX RENEWAL (OUTPATIENT)
Age: 64
End: 2019-09-25

## 2019-11-04 ENCOUNTER — APPOINTMENT (OUTPATIENT)
Dept: HEPATOLOGY | Facility: CLINIC | Age: 64
End: 2019-11-04
Payer: MEDICARE

## 2019-11-04 VITALS
RESPIRATION RATE: 17 BRPM | DIASTOLIC BLOOD PRESSURE: 88 MMHG | BODY MASS INDEX: 28.25 KG/M2 | HEART RATE: 49 BPM | TEMPERATURE: 97.7 F | SYSTOLIC BLOOD PRESSURE: 122 MMHG | WEIGHT: 180 LBS | HEIGHT: 67 IN

## 2019-11-04 PROCEDURE — 99214 OFFICE O/P EST MOD 30 MIN: CPT

## 2019-11-04 NOTE — ASSESSMENT
[FreeTextEntry1] : Mr. KNOX is a 64 year old man, orig. from [place], with\par - compensated cirrhosis, normal MELD labs, PLT < 150. More likely due to LASSITER than hepatitis B.\par   - medium-sized esophageal varices (EGD 5/22/19), on carvedilol 25 mg bid - tolerating now after initial dizziness when getting up\par - chronic hepatitis B diagnosed in 2005, unclear source of infection; on Viread since diagnosis with negative viral load\par - LASSITER - mild NAFLD suggested by US 11/2018; intermittently mildly elevated AST < 50 U/L\par - sub-cm hepatic cysts\par - glucose intolerance\par - overweight, BMI 26\par \par - GERD, LA-grade C reflux esophagitis (EGD 5/2019): on omeprazole 40 mg/d.\par \par Plan:\par - HCC screening: AFP and US abdomen in March 2020\par - varices continue carvedilol to 25 mg bid\par - H. pylori-negative erosive gastritis, esophagitis: continue omeprazole\par - NAFLD/LASSITER: weight loss and exercise - I suggested interval fasting, 1 meal per day\par

## 2019-11-04 NOTE — HISTORY OF PRESENT ILLNESS
[Fatigue] : denies fatigue [Malaise] : denies malaise [Fever] : denies fever [Diffuse Joint Pain (Arthralgias)] : denies arthralgias [Muscle Aches, Generalized (Myalgias)] : denies myalgias [Yellow Skin Or Eyes (Jaundice)] : denies jaundice [Abdominal Pain] : denies abdominal pain [Urine Tests Nonspecific Abnormal Findings Biliuria] : denies dark urine [Light Colored Bowel Movement (Acholic Stools)] : denies pale stools [Insomnia] : denies insomnia [Skin: Rash] : denies rash [Itching (Pruritus)] : denies pruritus [Shortness Of Breath] : denies shortness of breath [Needlestick Exposure] : no needlestick exposure [Infected Sexual Partner] : no infected sexual partner [IV Drug Use] : no IV drug use [Tattoo] : no tattoos [Body Piercing] : no body piercing [Hemodialysis] : no hemodialysis [Transfusion before 1992] : no transfusion before 1992 [Transplant before 1992] : no transplant before 1992 [Incarceration] : no incarceration [Alcohol Abuse] : no alcohol abuse [Autoimmune Disorder] : no autoimmune disorder [Household Contact to HBV] : no household contact to HBV [Travel to Endemic Area] : no travel to an endemic area [Occupational Exposure] : no occupational exposure [Cocaine Use] : no cocaine use [Wt Gain ___ Lbs] : no recent weight gain [Wt Loss ___ Lbs] : no recent weight loss [de-identified] : - 6/3/19: here after EGD which showed large esophageal varices. Tolerating carvedilol 12.5 mg bid - I switched from nadolol b/o bradycardia on sub-optimal dose. Today HR in the 50s as always. Feels very energetic - built the handrails of stairs and porch around his house in 3 days, but has longstanding sleep problems at night. \par \par - 7/15/19: doing well, tolerating carvedilol 25 mg bid, but HR 49 and BP sometimes 90s at home - got dizzy at home after getting up quickly; also taking omeprazole for GERD seen on EGD 5/2019.  CT 2/2019 report reviewed with patient.\par \par - initial visit  2/5/19: Mr. KNOX is a 63 year old man with chronic hepatitis B (dx. 2004), on Viread since diagnosis, compensated cirrhosis, large varices that never bled, on carvedilol, GERD, overweight BMI 27, 176 lbs. \par \par weight: never signif. heavier than now - overweight. Alcohol: social use. Never more than 1 drink per day.\par \par Workup:\par - 9/18/19 MRI ([facility]): mildly cirrhotic liver, sub-cm lesions likely cysts. Spleen normal. Stable SMA dissection. Normal GB. 1.1 cm ivanna hepatitis T2 hyperintense and enhancing lymph node. \par - 2/25/19 CT abdomen wwo (scanned): cirrhosis, small probable hemangioma seg IVb. SMA dissection several cm. Atherosclerotic changes. No comparison possible.\par - 5/22/19 EGD: medium-sized varices, not banded. LA-grade C reflux esophagitis. Two gastric inlet patches in the upper esophagus. Mild erosive gastritis, biopsied. Started Omeprazole 40 mg/d. Given bradycardia, switched nadolol 20 mg/d to carvedilol - start 6.25 mg bid, increase to 12.5 mg bid after 3 days if tolerated. Path: nonspecific gastritis, H. pylori negative.\par - 11/2016 MRI abdomen: Cirrhosis. 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 The remaining vessels are patent. No significant varices. IMPRESSION: Cirrhosis. No evidence of hepatoma\par - Colonoscopy: normal in 2011

## 2019-11-29 ENCOUNTER — RX RENEWAL (OUTPATIENT)
Age: 64
End: 2019-11-29

## 2019-12-09 ENCOUNTER — APPOINTMENT (OUTPATIENT)
Dept: INTERNAL MEDICINE | Facility: CLINIC | Age: 64
End: 2019-12-09

## 2020-01-15 ENCOUNTER — RX RENEWAL (OUTPATIENT)
Age: 65
End: 2020-01-15

## 2020-01-15 DIAGNOSIS — Z86.39 PERSONAL HISTORY OF OTHER ENDOCRINE, NUTRITIONAL AND METABOLIC DISEASE: ICD-10-CM

## 2020-01-22 ENCOUNTER — NON-APPOINTMENT (OUTPATIENT)
Age: 65
End: 2020-01-22

## 2020-01-22 ENCOUNTER — APPOINTMENT (OUTPATIENT)
Dept: INTERNAL MEDICINE | Facility: CLINIC | Age: 65
End: 2020-01-22
Payer: MEDICARE

## 2020-01-22 VITALS
WEIGHT: 176 LBS | OXYGEN SATURATION: 95 % | BODY MASS INDEX: 27.62 KG/M2 | DIASTOLIC BLOOD PRESSURE: 80 MMHG | HEIGHT: 67 IN | HEART RATE: 45 BPM | SYSTOLIC BLOOD PRESSURE: 120 MMHG

## 2020-01-22 DIAGNOSIS — Z23 ENCOUNTER FOR IMMUNIZATION: ICD-10-CM

## 2020-01-22 DIAGNOSIS — D22.9 MELANOCYTIC NEVI, UNSPECIFIED: ICD-10-CM

## 2020-01-22 DIAGNOSIS — Z12.5 ENCOUNTER FOR SCREENING FOR MALIGNANT NEOPLASM OF PROSTATE: ICD-10-CM

## 2020-01-22 PROCEDURE — 93000 ELECTROCARDIOGRAM COMPLETE: CPT | Mod: 59

## 2020-01-22 PROCEDURE — 90732 PPSV23 VACC 2 YRS+ SUBQ/IM: CPT

## 2020-01-22 PROCEDURE — G0009: CPT

## 2020-01-22 PROCEDURE — 99213 OFFICE O/P EST LOW 20 MIN: CPT | Mod: 25

## 2020-01-22 PROCEDURE — G0439: CPT

## 2020-01-22 PROCEDURE — G0444 DEPRESSION SCREEN ANNUAL: CPT

## 2020-01-22 PROCEDURE — 90472 IMMUNIZATION ADMIN EACH ADD: CPT

## 2020-01-22 PROCEDURE — 90662 IIV NO PRSV INCREASED AG IM: CPT

## 2020-01-22 NOTE — COUNSELING
[Health Goal(s) Reviewed with Patient] : Health Goal(s) Reviewed with Patient [[id number]] : Maintain a healthy lifestyle\par  [[id number]] : none noted

## 2020-01-22 NOTE — ASSESSMENT
[FreeTextEntry1] : 1.  HCM - preventive topics d/w pt\par -check screening labs\par -Hep C negative 2009\par - Pros/cons of PSA screening d/w pt including risk for false positive results requiring possibly unnecessary additional evaluation and risk for false negative results possibly missing a cancer and pt would like PSA screening done.\par -Colonoscopy was in 2011 - 10 year repeat was advised per pt\par -Flu shot and Pneumovax today.  Shingrix in the future.\par 2.  Hep B cirrhosis - labs as per plan, including those ordered by GI for early March.  Reminded of need to go for abdominal US as ordered.  On carvedilol whish he is tolerating despite severe sinus bradycardia on EKG for varices.  No c/o black, tarry stools.\par 3.  On lisinopril without problem.   Rx RN\par 4.  R/O actinic keratoses of legs.  Derm referral given.\par \par RTC 6 months or sooner prn advised

## 2020-01-22 NOTE — HISTORY OF PRESENT ILLNESS
[Health Maintenance] : health maintenance [Good] : good [___ Year(s) Ago] : [unfilled] year(s) ago [Vision Problems] : He denies vision problems [Reg. Dental Visits] : He does not have regular dental visits [Healthy Diet] : He consumes a diverse and healthy diet [Weight Concerns] : He does not have any weight concerns [Hearing Loss] : He denies hearing loss [Regular Exercise] : He exercises regularly [Tobacco Use] : He does not use tobacco [Cigarette ___ppd] : [unfilled] cigarette pack(s) per day [Alcohol Use] : He denies alcohol use [No Previous PSA Testing] : no previous prostate-specific antigen testing [Drug Abuse] : He denies drug use [Reviewed and Updated] : metabolic screening reviewed and updated [Colonoscopy Within 10 Years] : a colonoscopy within the past ten years [Seat Belt] : seat belt [Safe Driving Habits] : safe driving habits [Smoke Detector] : smoke detector [Sunscreen] : sunscreen [Carbon Monoxide Detector] : carbon monoxide detector [de-identified] : 0.5PPD x 20 years [Up to Date] : not up to date [FreeTextEntry9] :  [de-identified] : Pros/cons of PSA screening d/w pt including risk for false positive results requiring possibly unnecessary additional evaluation and risk for false negative results possibly missing a cancer and pt would like PSA screening done. [de-identified] : Last Colonoscopy 2011 and 10 year repeat was advised [de-identified] : pt would like flu vaccine today, needs a pneumovax as well [FreeTextEntry1] : \par Pt reports low back pain, worse on the left. he works as a super. It is better when he walks.

## 2020-01-22 NOTE — HEALTH RISK ASSESSMENT
[Very Good] : ~his/her~  mood as very good [] : No [6-10] : 6-10 [No] : In the past 12 months have you used drugs other than those required for medical reasons? No [No falls in past year] : Patient reported no falls in the past year [0] : 1) Little interest or pleasure doing things: Not at all (0) [[id number]] : 0 [Change in mental status noted] : No change in mental status noted [Language] : denies difficulty with language [Behavior] : denies difficulty with behavior [Learning/Retaining New Information] : denies difficulty learning/retaining new information [Handling Complex Tasks] : denies difficulty handling complex tasks [Spatial Ability and Orientation] : denies difficulty with spatial ability and orientation [Reasoning] : denies difficulty with reasoning [Financial] : financial [Employed] : employed [With Family] : lives with family [Feels Safe at Home] : Feels safe at home [] :  [Fully functional (bathing, dressing, toileting, transferring, walking, feeding)] : Fully functional (bathing, dressing, toileting, transferring, walking, feeding) [Fully functional (using the telephone, shopping, preparing meals, housekeeping, doing laundry, using] : Fully functional and needs no help or supervision to perform IADLs (using the telephone, shopping, preparing meals, housekeeping, doing laundry, using transportation, managing medications and managing finances) [Smoke Detector] : smoke detector [Reports changes in hearing] : Reports changes in hearing [Carbon Monoxide Detector] : carbon monoxide detector [Guns at Home] : no guns at home [Seat Belt] :  uses seat belt [Sunscreen] : uses sunscreen [de-identified] : Has had right hearing loss x 60 years [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

## 2020-01-22 NOTE — PHYSICAL EXAM
[General Appearance - Alert] : alert [General Appearance - In No Acute Distress] : in no acute distress [General Appearance - Well Nourished] : well nourished [General Appearance - Well Developed] : well developed [General Appearance - Well-Appearing] : healthy appearing [Sclera] : the sclera and conjunctiva were normal [PERRL With Normal Accommodation] : pupils were equal in size, round, and reactive to light [Examination Of The Oral Cavity] : the lips and gums were normal [Extraocular Movements] : extraocular movements were intact [Neck Appearance] : the appearance of the neck was normal [Oropharynx] : the oropharynx was normal [Respiration, Rhythm And Depth] : normal respiratory rhythm and effort [Exaggerated Use Of Accessory Muscles For Inspiration] : no accessory muscle use [Heart Rate And Rhythm] : heart rate was normal and rhythm regular [Auscultation Breath Sounds / Voice Sounds] : lungs were clear to auscultation bilaterally [Heart Sounds] : normal S1 and S2 [Edema] : there was no peripheral edema [Breast Appearance] : normal in appearance [Breast Abnormal Lactation (Galactorrhea)] : no nipple discharge [Breast Palpation Mass] : no palpable masses [Bowel Sounds] : normal bowel sounds [Abdomen Soft] : soft [Abdomen Tenderness] : non-tender [] : no hepato-splenomegaly [Penis Abnormality] : the penis was normal [Scrotum] : the scrotum was normal [Testes Swelling] : the testicles were not swollen [Testes Mass (___cm)] : there were no testicular masses [Cervical Lymph Nodes Enlarged Posterior Bilaterally] : posterior cervical [Supraclavicular Lymph Nodes Enlarged Bilaterally] : supraclavicular [Cervical Lymph Nodes Enlarged Anterior Bilaterally] : anterior cervical [Axillary Lymph Nodes Enlarged Bilaterally] : axillary [Inguinal Lymph Nodes Enlarged Bilaterally] : inguinal [No Spinal Tenderness] : no spinal tenderness [FreeTextEntry1] : on thighs b/l were 2 lesions that were scaly [Skin Turgor] : normal skin turgor [No Focal Deficits] : no focal deficits [Cranial Nerves] : cranial nerves 2-12 were intact [Oriented To Time, Place, And Person] : oriented to person, place, and time [Impaired Insight] : insight and judgment were intact [Affect] : the affect was normal [Mood] : the mood was normal

## 2020-03-16 ENCOUNTER — OUTPATIENT (OUTPATIENT)
Dept: OUTPATIENT SERVICES | Facility: HOSPITAL | Age: 65
LOS: 1 days | End: 2020-03-16
Payer: COMMERCIAL

## 2020-03-16 ENCOUNTER — APPOINTMENT (OUTPATIENT)
Dept: ULTRASOUND IMAGING | Facility: CLINIC | Age: 65
End: 2020-03-16
Payer: MEDICARE

## 2020-03-16 ENCOUNTER — RESULT REVIEW (OUTPATIENT)
Age: 65
End: 2020-03-16

## 2020-03-16 DIAGNOSIS — K75.81 NONALCOHOLIC STEATOHEPATITIS (NASH): ICD-10-CM

## 2020-03-16 DIAGNOSIS — K74.60 UNSPECIFIED CIRRHOSIS OF LIVER: ICD-10-CM

## 2020-03-16 PROCEDURE — 76700 US EXAM ABDOM COMPLETE: CPT | Mod: 26

## 2020-03-16 PROCEDURE — 76700 US EXAM ABDOM COMPLETE: CPT

## 2020-03-19 LAB
25(OH)D3 SERPL-MCNC: 33 NG/ML
AFP-TM SERPL-MCNC: 2.2 NG/ML
ALBUMIN SERPL ELPH-MCNC: 4.9 G/DL
ALP BLD-CCNC: 55 U/L
ALT SERPL-CCNC: 29 U/L
ANION GAP SERPL CALC-SCNC: 12 MMOL/L
AST SERPL-CCNC: 22 U/L
BASOPHILS # BLD AUTO: 0.02 K/UL
BASOPHILS NFR BLD AUTO: 0.4 %
BILIRUB SERPL-MCNC: 0.8 MG/DL
BUN SERPL-MCNC: 19 MG/DL
CALCIUM SERPL-MCNC: 9.9 MG/DL
CHLORIDE SERPL-SCNC: 101 MMOL/L
CHOLEST SERPL-MCNC: 186 MG/DL
CHOLEST/HDLC SERPL: 4.2 RATIO
CO2 SERPL-SCNC: 26 MMOL/L
CREAT SERPL-MCNC: 1.14 MG/DL
EOSINOPHIL # BLD AUTO: 0.23 K/UL
EOSINOPHIL NFR BLD AUTO: 4.4 %
ESTIMATED AVERAGE GLUCOSE: 126 MG/DL
GLUCOSE SERPL-MCNC: 115 MG/DL
HBA1C MFR BLD HPLC: 6 %
HBV DNA # SERPL NAA+PROBE: NOT DETECTED
HCT VFR BLD CALC: 45.9 %
HDLC SERPL-MCNC: 44 MG/DL
HEPB DNA PCR LOG: NOT DETECTED LOG10IU/ML
HGB BLD-MCNC: 15.1 G/DL
IMM GRANULOCYTES NFR BLD AUTO: 0.4 %
INR PPP: 1.1 RATIO
LDLC SERPL CALC-MCNC: 117 MG/DL
LYMPHOCYTES # BLD AUTO: 1.88 K/UL
LYMPHOCYTES NFR BLD AUTO: 35.9 %
MAN DIFF?: NORMAL
MCHC RBC-ENTMCNC: 29.4 PG
MCHC RBC-ENTMCNC: 32.9 GM/DL
MCV RBC AUTO: 89.5 FL
MONOCYTES # BLD AUTO: 0.44 K/UL
MONOCYTES NFR BLD AUTO: 8.4 %
NEUTROPHILS # BLD AUTO: 2.64 K/UL
NEUTROPHILS NFR BLD AUTO: 50.5 %
PLATELET # BLD AUTO: 130 K/UL
POTASSIUM SERPL-SCNC: 5.2 MMOL/L
PROT SERPL-MCNC: 7.2 G/DL
PSA SERPL-MCNC: 0.95 NG/ML
PT BLD: 12.6 SEC
RBC # BLD: 5.13 M/UL
RBC # FLD: 13.6 %
SODIUM SERPL-SCNC: 138 MMOL/L
T4 FREE SERPL-MCNC: 1.3 NG/DL
TRIGL SERPL-MCNC: 122 MG/DL
TSH SERPL-ACNC: 2.22 UIU/ML
WBC # FLD AUTO: 5.23 K/UL

## 2020-03-23 ENCOUNTER — APPOINTMENT (OUTPATIENT)
Dept: HEPATOLOGY | Facility: CLINIC | Age: 65
End: 2020-03-23

## 2020-03-30 ENCOUNTER — APPOINTMENT (OUTPATIENT)
Dept: HEPATOLOGY | Facility: CLINIC | Age: 65
End: 2020-03-30

## 2020-04-02 ENCOUNTER — APPOINTMENT (OUTPATIENT)
Dept: SPEECH THERAPY | Facility: CLINIC | Age: 65
End: 2020-04-02

## 2020-04-07 ENCOUNTER — APPOINTMENT (OUTPATIENT)
Dept: DERMATOLOGY | Facility: CLINIC | Age: 65
End: 2020-04-07

## 2020-07-17 ENCOUNTER — RX RENEWAL (OUTPATIENT)
Age: 65
End: 2020-07-17

## 2020-07-20 ENCOUNTER — APPOINTMENT (OUTPATIENT)
Dept: HEPATOLOGY | Facility: CLINIC | Age: 65
End: 2020-07-20
Payer: MEDICARE

## 2020-07-20 VITALS
RESPIRATION RATE: 15 BRPM | BODY MASS INDEX: 27.94 KG/M2 | TEMPERATURE: 98 F | SYSTOLIC BLOOD PRESSURE: 107 MMHG | HEIGHT: 67 IN | WEIGHT: 178 LBS | HEART RATE: 58 BPM | DIASTOLIC BLOOD PRESSURE: 72 MMHG

## 2020-07-20 DIAGNOSIS — I51.89 OTHER ILL-DEFINED HEART DISEASES: ICD-10-CM

## 2020-07-20 PROCEDURE — 99214 OFFICE O/P EST MOD 30 MIN: CPT

## 2020-07-20 NOTE — ASSESSMENT
[FreeTextEntry1] : Mr. KNOX is a 64 year old man, orig. from [place], with\par - compensated cirrhosis, normal MELD labs, PLT < 150. 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 and chronic hepatitis B\par   - medium-sized esophageal varices (EGD 5/22/19) that never bled, on carvedilol 25 mg bid - tolerating now after initial dizziness when getting up\par - chronic hepatitis B diagnosed in 2005, unclear source of infection; on Viread since diagnosis, negative viral load\par - LASSITER - mild NAFLD suggested by US; intermittently mildly elevated AST < 50 U/L\par - sub-cm hepatic cysts\par - glucose intolerance\par - overweight, BMI 26\par \par - GERD, LA-grade C reflux esophagitis (EGD 5/2019): takes TUMS every night. PPI helped in past.\par - colon cancer screening: will discuss\par \par Plan:\par - HCC screening: AFP and US abdomen in Sep 2020\par - varices continue carvedilol to 25 mg bid\par - GERD, continue omeprazole\par - NAFLD/LASSITER: weight loss and exercise. Nutritionist referral\par

## 2020-07-20 NOTE — HISTORY OF PRESENT ILLNESS
[Fatigue] : denies fatigue [Malaise] : denies malaise [Diffuse Joint Pain (Arthralgias)] : denies arthralgias [Fever] : denies fever [Muscle Aches, Generalized (Myalgias)] : denies myalgias [Yellow Skin Or Eyes (Jaundice)] : denies jaundice [Abdominal Pain] : denies abdominal pain [Urine Tests Nonspecific Abnormal Findings Biliuria] : denies dark urine [Light Colored Bowel Movement (Acholic Stools)] : denies pale stools [Skin: Rash] : denies rash [Itching (Pruritus)] : denies pruritus [Insomnia] : denies insomnia [Shortness Of Breath] : denies shortness of breath [Needlestick Exposure] : no needlestick exposure [Infected Sexual Partner] : no infected sexual partner [IV Drug Use] : no IV drug use [Tattoo] : no tattoos [Body Piercing] : no body piercing [Transfusion before 1992] : no transfusion before 1992 [Hemodialysis] : no hemodialysis [Transplant before 1992] : no transplant before 1992 [Incarceration] : no incarceration [Alcohol Abuse] : no alcohol abuse [Autoimmune Disorder] : no autoimmune disorder [Travel to Endemic Area] : no travel to an endemic area [Household Contact to HBV] : no household contact to HBV [Occupational Exposure] : no occupational exposure [Wt Gain ___ Lbs] : no recent weight gain [Cocaine Use] : no cocaine use [de-identified] : - 7/20/20: returns after labs in January. Tolerated carvedilol 25 mg bid, is very active gardening, makes his own wine. Takes tenofovir.\par - 11/4/20: return visit\par - 6/3/19: here after EGD which showed large esophageal varices. Tolerating carvedilol 12.5 mg bid - I switched from nadolol b/o bradycardia on sub-optimal dose. Today HR in the 50s as always. Feels very energetic - built the handrails of stairs and porch around his house in 3 days, but has longstanding sleep problems at night. \par \par - 7/15/19: doing well, tolerating carvedilol 25 mg bid, but HR 49 and BP sometimes 90s at home - got dizzy at home after getting up quickly; also taking omeprazole for GERD seen on EGD 5/2019.  CT 2/2019 report reviewed with patient.\par \par - initial visit  2/5/19: Mr. KNOX is a 63 year old man with chronic hepatitis B (dx. 2004), on Viread since diagnosis, compensated cirrhosis, large varices that never bled, on carvedilol, GERD, overweight BMI 27, 176 lbs. \par \par weight: never signif. heavier than now - overweight. Alcohol: social use. Never more than 1 drink per day.\par \par Workup:\par - 3/17/20 US abdomen: mild hepatic steatosis. No lesion. GB normal.\par - 9/18/19 MRI ([facility]): mildly cirrhotic liver, sub-cm lesions likely cysts. Spleen normal. Stable SMA dissection. Normal GB. 1.1 cm ivanna hepatitis T2 hyperintense and enhancing lymph node. \par - 2/25/19 CT abdomen wwo (scanned): cirrhosis, small probable hemangioma seg IVb. SMA dissection several cm. Atherosclerotic changes. No comparison possible.\par - 5/22/19 EGD: medium-sized varices, not banded. LA-grade C reflux esophagitis. Two gastric inlet patches in the upper esophagus. Mild erosive gastritis, biopsied. Started Omeprazole 40 mg/d. Given bradycardia, switched nadolol 20 mg/d to carvedilol - start 6.25 mg bid, increase to 12.5 mg bid after 3 days if tolerated. Path: nonspecific gastritis, H. pylori negative.\par - 11/2016 MRI abdomen: Cirrhosis. 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 The remaining vessels are patent. No significant varices. IMPRESSION: Cirrhosis. No evidence of hepatoma\par - Colonoscopy: normal in 2011 [Wt Loss ___ Lbs] : no recent weight loss

## 2020-09-10 LAB
AFP-TM SERPL-MCNC: 2 NG/ML
ALBUMIN SERPL ELPH-MCNC: 4.5 G/DL
ALP BLD-CCNC: 51 U/L
ALT SERPL-CCNC: 29 U/L
ANION GAP SERPL CALC-SCNC: 13 MMOL/L
AST SERPL-CCNC: 24 U/L
BASOPHILS # BLD AUTO: 0.02 K/UL
BASOPHILS NFR BLD AUTO: 0.4 %
BILIRUB SERPL-MCNC: 0.4 MG/DL
BUN SERPL-MCNC: 19 MG/DL
CALCIUM SERPL-MCNC: 9.2 MG/DL
CHLORIDE SERPL-SCNC: 103 MMOL/L
CHOLEST SERPL-MCNC: 182 MG/DL
CHOLEST/HDLC SERPL: 4.7 RATIO
CO2 SERPL-SCNC: 23 MMOL/L
CREAT SERPL-MCNC: 1.1 MG/DL
EOSINOPHIL # BLD AUTO: 0.22 K/UL
EOSINOPHIL NFR BLD AUTO: 4.8 %
GLUCOSE SERPL-MCNC: 116 MG/DL
HCT VFR BLD CALC: 43.6 %
HDLC SERPL-MCNC: 38 MG/DL
HGB BLD-MCNC: 14.5 G/DL
IMM GRANULOCYTES NFR BLD AUTO: 0.2 %
LDLC SERPL CALC-MCNC: 113 MG/DL
LYMPHOCYTES # BLD AUTO: 1.46 K/UL
LYMPHOCYTES NFR BLD AUTO: 31.7 %
MAN DIFF?: NORMAL
MCHC RBC-ENTMCNC: 29.1 PG
MCHC RBC-ENTMCNC: 33.3 GM/DL
MCV RBC AUTO: 87.6 FL
MONOCYTES # BLD AUTO: 0.34 K/UL
MONOCYTES NFR BLD AUTO: 7.4 %
NEUTROPHILS # BLD AUTO: 2.56 K/UL
NEUTROPHILS NFR BLD AUTO: 55.5 %
PLATELET # BLD AUTO: 109 K/UL
POTASSIUM SERPL-SCNC: 4.2 MMOL/L
PROT SERPL-MCNC: 6.6 G/DL
RBC # BLD: 4.98 M/UL
RBC # FLD: 12.9 %
SODIUM SERPL-SCNC: 139 MMOL/L
TRIGL SERPL-MCNC: 152 MG/DL
WBC # FLD AUTO: 4.61 K/UL

## 2020-09-22 ENCOUNTER — OUTPATIENT (OUTPATIENT)
Dept: OUTPATIENT SERVICES | Facility: HOSPITAL | Age: 65
LOS: 1 days | End: 2020-09-22
Payer: COMMERCIAL

## 2020-09-22 ENCOUNTER — APPOINTMENT (OUTPATIENT)
Dept: ULTRASOUND IMAGING | Facility: CLINIC | Age: 65
End: 2020-09-22

## 2020-09-22 DIAGNOSIS — Z00.8 ENCOUNTER FOR OTHER GENERAL EXAMINATION: ICD-10-CM

## 2020-09-22 PROCEDURE — 76700 US EXAM ABDOM COMPLETE: CPT | Mod: 26

## 2020-09-22 PROCEDURE — 76700 US EXAM ABDOM COMPLETE: CPT

## 2020-10-11 NOTE — ASSESSMENT
[FreeTextEntry1] : - chronic hepatitis B diagnosed in 2005, unclear source of infection; on Viread since diagnosis\par - compensated cirrhosis, normal MELD labs, PLT < 150\par - intermittently mildly elevated AST < 50 U/L\par - overweight, BMI 26\par - mild NAFLD suggested by US 11/2018 - may have LASSITER as (additional) cause of his cirrhosis\par - small esophageal varices - last EGD 5/2019 - switched nadolol to carvedilol; tolerating  25 mg bid largely - has mild dizziness when he gets up fast or is dehydrated\par - GERD: resolved since switched from TUMS to omeprazole in February 2019\par \par Plan:\par - MRI 8/2019 for HCC screening and f/u of the SMA dissection seen on MRI in 2015\par - continue carvedilol to 25 mg bid\par - H. pylori-negative erosive gastritis: continue omeprazole for now\par - return in 2 months after imaging and labs no

## 2020-10-19 ENCOUNTER — APPOINTMENT (OUTPATIENT)
Dept: HEPATOLOGY | Facility: CLINIC | Age: 65
End: 2020-10-19
Payer: MEDICARE

## 2020-10-19 VITALS
HEART RATE: 64 BPM | TEMPERATURE: 98 F | SYSTOLIC BLOOD PRESSURE: 131 MMHG | BODY MASS INDEX: 27.47 KG/M2 | WEIGHT: 175 LBS | DIASTOLIC BLOOD PRESSURE: 72 MMHG | RESPIRATION RATE: 14 BRPM | HEIGHT: 67 IN

## 2020-10-19 PROCEDURE — 99214 OFFICE O/P EST MOD 30 MIN: CPT

## 2020-10-19 NOTE — HISTORY OF PRESENT ILLNESS
[Malaise] : denies malaise [Fatigue] : denies fatigue [Fever] : denies fever [Yellow Skin Or Eyes (Jaundice)] : denies jaundice [Diffuse Joint Pain (Arthralgias)] : denies arthralgias [Muscle Aches, Generalized (Myalgias)] : denies myalgias [Insomnia] : denies insomnia [Urine Tests Nonspecific Abnormal Findings Biliuria] : denies dark urine [Light Colored Bowel Movement (Acholic Stools)] : denies pale stools [Abdominal Pain] : denies abdominal pain [Shortness Of Breath] : denies shortness of breath [Skin: Rash] : denies rash [Itching (Pruritus)] : denies pruritus [Needlestick Exposure] : no needlestick exposure [Infected Sexual Partner] : no infected sexual partner [Tattoo] : no tattoos [IV Drug Use] : no IV drug use [Body Piercing] : no body piercing [Hemodialysis] : no hemodialysis [Transplant before 1992] : no transplant before 1992 [Transfusion before 1992] : no transfusion before 1992 [Alcohol Abuse] : no alcohol abuse [Incarceration] : no incarceration [Autoimmune Disorder] : no autoimmune disorder [Household Contact to HBV] : no household contact to HBV [Travel to Endemic Area] : no travel to an endemic area [Occupational Exposure] : no occupational exposure [Cocaine Use] : no cocaine use [Wt Gain ___ Lbs] : no recent weight gain [Wt Loss ___ Lbs] : no recent weight loss [de-identified] : - 10/19/20: returns after US showed 1 cm liver lesion and MRI showed indeterminate lesion. Doing well. GERD controlled with omeprazole. \par \par - 7/20/20: returns after labs in January. Tolerated carvedilol 25 mg bid, is very active gardening, makes his own wine. Takes tenofovir.\par - 11/4/20: return visit\par - 6/3/19: here after EGD which showed large esophageal varices. Tolerating carvedilol 12.5 mg bid - I switched from nadolol b/o bradycardia on sub-optimal dose. Today HR in the 50s as always. Feels very energetic - built the handrails of stairs and porch around his house in 3 days, but has longstanding sleep problems at night. \par \par - 7/15/19: doing well, tolerating carvedilol 25 mg bid, but HR 49 and BP sometimes 90s at home - got dizzy at home after getting up quickly; also taking omeprazole for GERD seen on EGD 5/2019.  CT 2/2019 report reviewed with patient.\par \par - initial visit  2/5/19: Mr. KNOX is a 63 year old man with chronic hepatitis B (dx. 2004), on Viread since diagnosis, compensated cirrhosis, large varices that never bled, on carvedilol, GERD, overweight BMI 27, 176 lbs. \par \par weight: never signif. heavier than now - overweight. Alcohol: social use. Never more than 1 drink per day.\par \par Workup:\par - 10/7/20 MRI (scanned): 6 mm liver lesion inferior R lobe seg 6/7, T2 hyperintense, no definitive arterial enhancement; motion artefact. No overt cirrhotic features. Recommend MRI after 4-6 mo. Moderate fatty replacement-marbling throughout pancreas.\par - 9/25/20 US abdomen: cirrhosis, 1 cm liver lesion indeterminate.\par - 3/17/20 US abdomen: mild hepatic steatosis. No lesion. GB normal.\par - 9/18/19 MRI ([facility]): mildly cirrhotic liver, sub-cm lesions likely cysts. Spleen normal. Stable SMA dissection. Normal GB. 1.1 cm ivanna hepatitis T2 hyperintense and enhancing lymph node. \par - 2/25/19 CT abdomen wwo (scanned): cirrhosis, small probable hemangioma seg IVb. SMA dissection several cm. Atherosclerotic changes. No comparison possible.\par - 5/22/19 EGD: medium-sized varices, not banded. LA-grade C reflux esophagitis. Two gastric inlet patches in the upper esophagus. Mild erosive gastritis, biopsied. Started Omeprazole 40 mg/d. Given bradycardia, switched nadolol 20 mg/d to carvedilol - start 6.25 mg bid, increase to 12.5 mg bid after 3 days if tolerated. Path: nonspecific gastritis, H. pylori negative.\par - 11/2016 MRI abdomen: Cirrhosis. 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 The remaining vessels are patent. No significant varices. IMPRESSION: Cirrhosis. No evidence of hepatoma\par - Colonoscopy: normal in 2011

## 2020-10-19 NOTE — ASSESSMENT
[FreeTextEntry1] : Mr. KNOX is a 65 year old man, orig. from [place], with\par - compensated cirrhosis, normal MELD labs, PLT < 150. 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 and chronic hepatitis B\par   - medium-sized esophageal varices (EGD 5/22/19) that never bled, on carvedilol 25 mg bid - tolerating now after initial dizziness when getting up\par - chronic hepatitis B diagnosed in 2005, unclear source of infection; on Viread since diagnosis, negative viral load\par - LASSITER - mild NAFLD suggested by US; intermittently mildly elevated AST < 50 U/L\par - liver lesion: 6 mm indeterminate on outside MRI; was 10 mm on US\par - sub-cm hepatic cysts\par - glucose intolerance\par - overweight, BMI 26\par \par - GERD, LA-grade C reflux esophagitis (EGD 5/2019): takes TUMS every night. PPI helped in past.\par - colon cancer screening: will discuss\par \par Plan:\par - liver lesion: AFP and repeat MRI after 3 months - in January\par - varices continue carvedilol to 25 mg bid\par - GERD: attempt to wean omeprazole. Can take qod and H2-blocker in between, then stop\par - NAFLD/LASSITER: weight loss and exercise. Nutritionist referral\par

## 2020-11-19 ENCOUNTER — APPOINTMENT (OUTPATIENT)
Dept: SPEECH THERAPY | Facility: CLINIC | Age: 65
End: 2020-11-19

## 2020-11-19 ENCOUNTER — OUTPATIENT (OUTPATIENT)
Dept: OUTPATIENT SERVICES | Facility: HOSPITAL | Age: 65
LOS: 1 days | Discharge: HOME | End: 2020-11-19

## 2020-12-23 DIAGNOSIS — H90.A31 MIXED CONDUCTIVE AND SENSORINEURAL HEARING LOSS, UNILATERAL, RIGHT EAR WITH RESTRICTED HEARING ON THE CONTRALATERAL SIDE: ICD-10-CM

## 2021-01-05 ENCOUNTER — NON-APPOINTMENT (OUTPATIENT)
Age: 66
End: 2021-01-05

## 2021-01-06 LAB
AFP-TM SERPL-MCNC: 2.2 NG/ML
ALBUMIN SERPL ELPH-MCNC: 4.7 G/DL
ALP BLD-CCNC: 65 U/L
ALT SERPL-CCNC: 33 U/L
ANION GAP SERPL CALC-SCNC: 11 MMOL/L
AST SERPL-CCNC: 28 U/L
BASOPHILS # BLD AUTO: 0.02 K/UL
BASOPHILS NFR BLD AUTO: 0.4 %
BILIRUB SERPL-MCNC: 0.8 MG/DL
BUN SERPL-MCNC: 15 MG/DL
CALCIUM SERPL-MCNC: 9.7 MG/DL
CHLORIDE SERPL-SCNC: 103 MMOL/L
CO2 SERPL-SCNC: 25 MMOL/L
CREAT SERPL-MCNC: 1.1 MG/DL
EOSINOPHIL # BLD AUTO: 0.21 K/UL
EOSINOPHIL NFR BLD AUTO: 3.9 %
GLUCOSE SERPL-MCNC: 107 MG/DL
HCT VFR BLD CALC: 45.5 %
HGB BLD-MCNC: 15 G/DL
IMM GRANULOCYTES NFR BLD AUTO: 0.4 %
INR PPP: 1.09 RATIO
LYMPHOCYTES # BLD AUTO: 1.64 K/UL
LYMPHOCYTES NFR BLD AUTO: 30.8 %
MAN DIFF?: NORMAL
MCHC RBC-ENTMCNC: 29.1 PG
MCHC RBC-ENTMCNC: 33 GM/DL
MCV RBC AUTO: 88.2 FL
MONOCYTES # BLD AUTO: 0.39 K/UL
MONOCYTES NFR BLD AUTO: 7.3 %
NEUTROPHILS # BLD AUTO: 3.04 K/UL
NEUTROPHILS NFR BLD AUTO: 57.2 %
PLATELET # BLD AUTO: 121 K/UL
POTASSIUM SERPL-SCNC: 4.5 MMOL/L
PROT SERPL-MCNC: 7.2 G/DL
PT BLD: 12.9 SEC
RBC # BLD: 5.16 M/UL
RBC # FLD: 13.8 %
SODIUM SERPL-SCNC: 138 MMOL/L
WBC # FLD AUTO: 5.32 K/UL

## 2021-01-08 LAB
HBV DNA # SERPL NAA+PROBE: NOT DETECTED IU/ML
HEPB DNA PCR LOG: NOT DETECTED LOG10IU/ML

## 2021-01-24 ENCOUNTER — APPOINTMENT (OUTPATIENT)
Dept: MRI IMAGING | Facility: IMAGING CENTER | Age: 66
End: 2021-01-24

## 2021-02-04 ENCOUNTER — NON-APPOINTMENT (OUTPATIENT)
Age: 66
End: 2021-02-04

## 2021-02-04 ENCOUNTER — APPOINTMENT (OUTPATIENT)
Dept: HEPATOLOGY | Facility: CLINIC | Age: 66
End: 2021-02-04
Payer: MEDICARE

## 2021-02-04 VITALS
BODY MASS INDEX: 28.25 KG/M2 | DIASTOLIC BLOOD PRESSURE: 66 MMHG | SYSTOLIC BLOOD PRESSURE: 109 MMHG | TEMPERATURE: 98 F | HEART RATE: 59 BPM | OXYGEN SATURATION: 98 % | HEIGHT: 67 IN | WEIGHT: 180 LBS

## 2021-02-04 PROCEDURE — 99072 ADDL SUPL MATRL&STAF TM PHE: CPT

## 2021-02-04 PROCEDURE — 99214 OFFICE O/P EST MOD 30 MIN: CPT

## 2021-02-04 NOTE — HISTORY OF PRESENT ILLNESS
[Fatigue] : denies fatigue [Malaise] : denies malaise [Fever] : denies fever [Diffuse Joint Pain (Arthralgias)] : denies arthralgias [Muscle Aches, Generalized (Myalgias)] : denies myalgias [Yellow Skin Or Eyes (Jaundice)] : denies jaundice [Abdominal Pain] : denies abdominal pain [Urine Tests Nonspecific Abnormal Findings Biliuria] : denies dark urine [Light Colored Bowel Movement (Acholic Stools)] : denies pale stools [Insomnia] : denies insomnia [Skin: Rash] : denies rash [Itching (Pruritus)] : denies pruritus [Shortness Of Breath] : denies shortness of breath [Needlestick Exposure] : no needlestick exposure [Infected Sexual Partner] : no infected sexual partner [IV Drug Use] : no IV drug use [Tattoo] : no tattoos [Body Piercing] : no body piercing [Hemodialysis] : no hemodialysis [Transfusion before 1992] : no transfusion before 1992 [Transplant before 1992] : no transplant before 1992 [Incarceration] : no incarceration [Alcohol Abuse] : no alcohol abuse [Autoimmune Disorder] : no autoimmune disorder [Household Contact to HBV] : no household contact to HBV [Travel to Endemic Area] : no travel to an endemic area [Occupational Exposure] : no occupational exposure [Cocaine Use] : no cocaine use [Wt Gain ___ Lbs] : no recent weight gain [Wt Loss ___ Lbs] : no recent weight loss [de-identified] : - 2/4/21: returns after bloodwork. MRI done at [facility] report not available. Had hypotension in December, , but no hematochezia or melena. Feels good, is active. Has not lost weight, BMI 28.2\par \par - 10/19/20: returns after US showed 1 cm liver lesion and MRI showed indeterminate lesion. Doing well. GERD controlled with omeprazole. \par \par - 7/20/20: returns after labs in January. Tolerated carvedilol 25 mg bid, is very active gardening, makes his own wine. Takes tenofovir.\par - 11/4/20: return visit\par - 6/3/19: here after EGD which showed large esophageal varices. Tolerating carvedilol 12.5 mg bid - I switched from nadolol b/o bradycardia on sub-optimal dose. Today HR in the 50s as always. Feels very energetic - built the handrails of stairs and porch around his house in 3 days, but has longstanding sleep problems at night. \par \par - 7/15/19: doing well, tolerating carvedilol 25 mg bid, but HR 49 and BP sometimes 90s at home - got dizzy at home after getting up quickly; also taking omeprazole for GERD seen on EGD 5/2019.  CT 2/2019 report reviewed with patient.\par \par - initial visit  2/5/19: Mr. KNOX is a 63 year old man with chronic hepatitis B (dx. 2004), on Viread since diagnosis, compensated cirrhosis, large varices that never bled, on carvedilol, GERD, overweight BMI 27, 176 lbs. \par \par weight: never signif. heavier than now - overweight. Alcohol: social use. Never more than 1 drink per day.\par \par Workup:\par - 10/7/20 MRI (scanned): 6 mm liver lesion inferior R lobe seg 6/7, T2 hyperintense, no definitive arterial enhancement; motion artefact. No overt cirrhotic features. Recommend MRI after 4-6 mo. Moderate fatty replacement-marbling throughout pancreas.\par - 9/25/20 US abdomen: cirrhosis, 1 cm liver lesion indeterminate.\par - 3/17/20 US abdomen: mild hepatic steatosis. No lesion. GB normal.\par - 9/18/19 MRI ([facility]): mildly cirrhotic liver, sub-cm lesions likely cysts. Spleen normal. Stable SMA dissection. Normal GB. 1.1 cm ivanna hepatitis T2 hyperintense and enhancing lymph node. \par - 2/25/19 CT abdomen wwo (scanned): cirrhosis, small probable hemangioma seg IVb. SMA dissection several cm. Atherosclerotic changes. No comparison possible.\par - 5/22/19 EGD: medium-sized varices, not banded. LA-grade C reflux esophagitis. Two gastric inlet patches in the upper esophagus. Mild erosive gastritis, biopsied. Started Omeprazole 40 mg/d. Given bradycardia, switched nadolol 20 mg/d to carvedilol - start 6.25 mg bid, increase to 12.5 mg bid after 3 days if tolerated. Path: nonspecific gastritis, H. pylori negative.\par - 11/2016 MRI abdomen: Cirrhosis. 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 The remaining vessels are patent. No significant varices. IMPRESSION: Cirrhosis. No evidence of hepatoma\par - Colonoscopy: normal in 2011

## 2021-02-04 NOTE — ASSESSMENT
[FreeTextEntry1] : Mr. KNOX is a 65 year old man, orig. from [place], with\par \par - compensated cirrhosis, normal MELD labs, PLT < 150. 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 and chronic hepatitis B\par   - medium-sized esophageal varices (EGD 5/22/19) that never bled, on carvedilol 25 mg bid - tolerating now after initial dizziness when getting up\par - chronic hepatitis B diagnosed in 2005, unclear source of infection; on Viread since diagnosis, negative viral load\par - LASSITER - mild NAFLD suggested by US; intermittently mildly elevated AST < 50 U/L\par - liver lesion: 6 mm indeterminate on outside MRI; was 10 mm on US\par - sub-cm hepatic cysts\par - glucose intolerance\par - overweight, BMI 26\par \par - GERD, LA-grade C reflux esophagitis (EGD 5/2019): takes TUMS every night. PPI helped in past.\par - colon cancer screening: will discuss\par \par Plan:\par - liver lesion: he will provide report and images of recent MRI\par - varices continue carvedilol to 25 mg bid\par - GERD: attempt to wean omeprazole. Can take qod and H2-blocker in between, then stop\par - NAFLD/LASSITER: weight loss and exercise. Start Vit E 800 IU/mL \par

## 2021-02-04 NOTE — HISTORY OF PRESENT ILLNESS
[Fatigue] : denies fatigue [Malaise] : denies malaise [Fever] : denies fever [Diffuse Joint Pain (Arthralgias)] : denies arthralgias [Muscle Aches, Generalized (Myalgias)] : denies myalgias [Yellow Skin Or Eyes (Jaundice)] : denies jaundice [Abdominal Pain] : denies abdominal pain [Urine Tests Nonspecific Abnormal Findings Biliuria] : denies dark urine [Light Colored Bowel Movement (Acholic Stools)] : denies pale stools [Insomnia] : denies insomnia [Skin: Rash] : denies rash [Itching (Pruritus)] : denies pruritus [Shortness Of Breath] : denies shortness of breath [Needlestick Exposure] : no needlestick exposure [Infected Sexual Partner] : no infected sexual partner [IV Drug Use] : no IV drug use [Tattoo] : no tattoos [Body Piercing] : no body piercing [Hemodialysis] : no hemodialysis [Transfusion before 1992] : no transfusion before 1992 [Transplant before 1992] : no transplant before 1992 [Incarceration] : no incarceration [Alcohol Abuse] : no alcohol abuse [Autoimmune Disorder] : no autoimmune disorder [Household Contact to HBV] : no household contact to HBV [Travel to Endemic Area] : no travel to an endemic area [Occupational Exposure] : no occupational exposure [Cocaine Use] : no cocaine use [Wt Gain ___ Lbs] : no recent weight gain [Wt Loss ___ Lbs] : no recent weight loss [de-identified] : - 2/4/21: returns after bloodwork. MRI done at [facility] report not available. Had hypotension in December, , but no hematochezia or melena. Feels good, is active. Has not lost weight, BMI 28.2\par \par - 10/19/20: returns after US showed 1 cm liver lesion and MRI showed indeterminate lesion. Doing well. GERD controlled with omeprazole. \par \par - 7/20/20: returns after labs in January. Tolerated carvedilol 25 mg bid, is very active gardening, makes his own wine. Takes tenofovir.\par - 11/4/20: return visit\par - 6/3/19: here after EGD which showed large esophageal varices. Tolerating carvedilol 12.5 mg bid - I switched from nadolol b/o bradycardia on sub-optimal dose. Today HR in the 50s as always. Feels very energetic - built the handrails of stairs and porch around his house in 3 days, but has longstanding sleep problems at night. \par \par - 7/15/19: doing well, tolerating carvedilol 25 mg bid, but HR 49 and BP sometimes 90s at home - got dizzy at home after getting up quickly; also taking omeprazole for GERD seen on EGD 5/2019.  CT 2/2019 report reviewed with patient.\par \par - initial visit  2/5/19: Mr. KNOX is a 63 year old man with chronic hepatitis B (dx. 2004), on Viread since diagnosis, compensated cirrhosis, large varices that never bled, on carvedilol, GERD, overweight BMI 27, 176 lbs. \par \par weight: never signif. heavier than now - overweight. Alcohol: social use. Never more than 1 drink per day.\par \par Workup:\par - 10/7/20 MRI (scanned): 6 mm liver lesion inferior R lobe seg 6/7, T2 hyperintense, no definitive arterial enhancement; motion artefact. No overt cirrhotic features. Recommend MRI after 4-6 mo. Moderate fatty replacement-marbling throughout pancreas.\par - 9/25/20 US abdomen: cirrhosis, 1 cm liver lesion indeterminate.\par - 3/17/20 US abdomen: mild hepatic steatosis. No lesion. GB normal.\par - 9/18/19 MRI ([facility]): mildly cirrhotic liver, sub-cm lesions likely cysts. Spleen normal. Stable SMA dissection. Normal GB. 1.1 cm ivanna hepatitis T2 hyperintense and enhancing lymph node. \par - 2/25/19 CT abdomen wwo (scanned): cirrhosis, small probable hemangioma seg IVb. SMA dissection several cm. Atherosclerotic changes. No comparison possible.\par - 5/22/19 EGD: medium-sized varices, not banded. LA-grade C reflux esophagitis. Two gastric inlet patches in the upper esophagus. Mild erosive gastritis, biopsied. Started Omeprazole 40 mg/d. Given bradycardia, switched nadolol 20 mg/d to carvedilol - start 6.25 mg bid, increase to 12.5 mg bid after 3 days if tolerated. Path: nonspecific gastritis, H. pylori negative.\par - 11/2016 MRI abdomen: Cirrhosis. 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 The remaining vessels are patent. No significant varices. IMPRESSION: Cirrhosis. No evidence of hepatoma\par - Colonoscopy: normal in 2011

## 2021-03-17 ENCOUNTER — NON-APPOINTMENT (OUTPATIENT)
Age: 66
End: 2021-03-17

## 2021-03-22 ENCOUNTER — INPATIENT (INPATIENT)
Facility: HOSPITAL | Age: 66
LOS: 2 days | Discharge: HOME | DRG: 286 | End: 2021-03-25
Attending: EMERGENCY MEDICINE | Admitting: EMERGENCY MEDICINE
Payer: COMMERCIAL

## 2021-03-22 VITALS
WEIGHT: 169.98 LBS | DIASTOLIC BLOOD PRESSURE: 91 MMHG | RESPIRATION RATE: 18 BRPM | TEMPERATURE: 98 F | OXYGEN SATURATION: 99 % | HEART RATE: 59 BPM | SYSTOLIC BLOOD PRESSURE: 137 MMHG | HEIGHT: 68 IN

## 2021-03-22 NOTE — ED ADULT TRIAGE NOTE - ARRIVAL FROM
Clinic hours for Dr. Everett:  Monday    In Surgery  Tuesday 7:30am - 4:30pm  Wednesday 7:30am - 4:30pm  Thursday       7:30am - 4:30pm  Friday            7:30 - 11am    If you need a refill on your prescription please call your pharmacy and let them know. Please be proactive and call before your medication runs out.    The pharmacy will then contact us for the refill.  Please allow 24-48 hours for the refill to be processed.     If your physician has ordered additional laboratory or radiology testing as part of your ongoing plan of care, please allow 5-7 business days from the day of your lab draw or test for the results to be sent and reviewed by your provider. If your results are critical and require more immediate intervention, you will be contacted sooner. Your results will be conveyed to you via a phone call or letter.    You may be receiving a patient satisfaction survey in the mail.   Please take the time to complete, as your feedback is very important to us.   We strive to make your experience exceptional and your comments help us with that goal.  We look forward to hearing from you.       Home

## 2021-03-23 DIAGNOSIS — R07.9 CHEST PAIN, UNSPECIFIED: ICD-10-CM

## 2021-03-23 LAB
ALBUMIN SERPL ELPH-MCNC: 3.9 G/DL — SIGNIFICANT CHANGE UP (ref 3.3–5)
ALP SERPL-CCNC: 55 U/L — SIGNIFICANT CHANGE UP (ref 40–120)
ALT FLD-CCNC: 36 U/L — SIGNIFICANT CHANGE UP (ref 10–45)
ANION GAP SERPL CALC-SCNC: 9 MMOL/L — SIGNIFICANT CHANGE UP (ref 5–17)
AST SERPL-CCNC: 31 U/L — SIGNIFICANT CHANGE UP (ref 10–40)
BASOPHILS # BLD AUTO: 0.01 K/UL — SIGNIFICANT CHANGE UP (ref 0–0.2)
BASOPHILS NFR BLD AUTO: 0.2 % — SIGNIFICANT CHANGE UP (ref 0–2)
BILIRUB SERPL-MCNC: 0.4 MG/DL — SIGNIFICANT CHANGE UP (ref 0.2–1.2)
BUN SERPL-MCNC: 23 MG/DL — SIGNIFICANT CHANGE UP (ref 7–23)
CALCIUM SERPL-MCNC: 9.2 MG/DL — SIGNIFICANT CHANGE UP (ref 8.4–10.5)
CHLORIDE SERPL-SCNC: 105 MMOL/L — SIGNIFICANT CHANGE UP (ref 96–108)
CHOLEST SERPL-MCNC: 132 MG/DL — SIGNIFICANT CHANGE UP
CO2 SERPL-SCNC: 26 MMOL/L — SIGNIFICANT CHANGE UP (ref 22–31)
CREAT SERPL-MCNC: 0.79 MG/DL — SIGNIFICANT CHANGE UP (ref 0.5–1.3)
EOSINOPHIL # BLD AUTO: 0.28 K/UL — SIGNIFICANT CHANGE UP (ref 0–0.5)
EOSINOPHIL NFR BLD AUTO: 6.2 % — HIGH (ref 0–6)
GLUCOSE SERPL-MCNC: 125 MG/DL — HIGH (ref 70–99)
HCT VFR BLD CALC: 39.3 % — SIGNIFICANT CHANGE UP (ref 39–50)
HDLC SERPL-MCNC: 29 MG/DL — LOW
HGB BLD-MCNC: 13.1 G/DL — SIGNIFICANT CHANGE UP (ref 13–17)
IMM GRANULOCYTES NFR BLD AUTO: 0.2 % — SIGNIFICANT CHANGE UP (ref 0–1.5)
LIDOCAIN IGE QN: 57 U/L — SIGNIFICANT CHANGE UP (ref 7–60)
LIPID PNL WITH DIRECT LDL SERPL: 68 MG/DL — SIGNIFICANT CHANGE UP
LYMPHOCYTES # BLD AUTO: 1.72 K/UL — SIGNIFICANT CHANGE UP (ref 1–3.3)
LYMPHOCYTES # BLD AUTO: 37.9 % — SIGNIFICANT CHANGE UP (ref 13–44)
MCHC RBC-ENTMCNC: 29.2 PG — SIGNIFICANT CHANGE UP (ref 27–34)
MCHC RBC-ENTMCNC: 33.3 GM/DL — SIGNIFICANT CHANGE UP (ref 32–36)
MCV RBC AUTO: 87.5 FL — SIGNIFICANT CHANGE UP (ref 80–100)
MONOCYTES # BLD AUTO: 0.5 K/UL — SIGNIFICANT CHANGE UP (ref 0–0.9)
MONOCYTES NFR BLD AUTO: 11 % — SIGNIFICANT CHANGE UP (ref 2–14)
NEUTROPHILS # BLD AUTO: 2.02 K/UL — SIGNIFICANT CHANGE UP (ref 1.8–7.4)
NEUTROPHILS NFR BLD AUTO: 44.5 % — SIGNIFICANT CHANGE UP (ref 43–77)
NON HDL CHOLESTEROL: 104 MG/DL — SIGNIFICANT CHANGE UP
NRBC # BLD: 0 /100 WBCS — SIGNIFICANT CHANGE UP (ref 0–0)
PLATELET # BLD AUTO: 99 K/UL — LOW (ref 150–400)
POTASSIUM SERPL-MCNC: 3.9 MMOL/L — SIGNIFICANT CHANGE UP (ref 3.5–5.3)
POTASSIUM SERPL-SCNC: 3.9 MMOL/L — SIGNIFICANT CHANGE UP (ref 3.5–5.3)
PROT SERPL-MCNC: 6.4 G/DL — SIGNIFICANT CHANGE UP (ref 6–8.3)
RBC # BLD: 4.49 M/UL — SIGNIFICANT CHANGE UP (ref 4.2–5.8)
RBC # FLD: 13.2 % — SIGNIFICANT CHANGE UP (ref 10.3–14.5)
SARS-COV-2 RNA SPEC QL NAA+PROBE: SIGNIFICANT CHANGE UP
SODIUM SERPL-SCNC: 140 MMOL/L — SIGNIFICANT CHANGE UP (ref 135–145)
TRIGL SERPL-MCNC: 181 MG/DL — HIGH
TROPONIN T, HIGH SENSITIVITY RESULT: 7 NG/L — SIGNIFICANT CHANGE UP (ref 0–51)
TROPONIN T, HIGH SENSITIVITY RESULT: 8 NG/L — SIGNIFICANT CHANGE UP (ref 0–51)
WBC # BLD: 4.54 K/UL — SIGNIFICANT CHANGE UP (ref 3.8–10.5)
WBC # FLD AUTO: 4.54 K/UL — SIGNIFICANT CHANGE UP (ref 3.8–10.5)

## 2021-03-23 PROCEDURE — 74177 CT ABD & PELVIS W/CONTRAST: CPT | Mod: 26

## 2021-03-23 PROCEDURE — 93016 CV STRESS TEST SUPVJ ONLY: CPT

## 2021-03-23 PROCEDURE — 78452 HT MUSCLE IMAGE SPECT MULT: CPT | Mod: 26

## 2021-03-23 PROCEDURE — 71046 X-RAY EXAM CHEST 2 VIEWS: CPT | Mod: 26

## 2021-03-23 PROCEDURE — 93018 CV STRESS TEST I&R ONLY: CPT

## 2021-03-23 RX ORDER — LISINOPRIL 2.5 MG/1
2.5 TABLET ORAL DAILY
Refills: 0 | Status: DISCONTINUED | OUTPATIENT
Start: 2021-03-23 | End: 2021-03-25

## 2021-03-23 RX ORDER — TENOFOVIR DISOPROXIL FUMARATE 300 MG/1
300 TABLET, FILM COATED ORAL DAILY
Refills: 0 | Status: DISCONTINUED | OUTPATIENT
Start: 2021-03-23 | End: 2021-03-25

## 2021-03-23 RX ORDER — ASPIRIN/CALCIUM CARB/MAGNESIUM 324 MG
324 TABLET ORAL ONCE
Refills: 0 | Status: COMPLETED | OUTPATIENT
Start: 2021-03-23 | End: 2021-03-23

## 2021-03-23 RX ORDER — FAMOTIDINE 10 MG/ML
20 INJECTION INTRAVENOUS ONCE
Refills: 0 | Status: COMPLETED | OUTPATIENT
Start: 2021-03-23 | End: 2021-03-23

## 2021-03-23 RX ORDER — HEPARIN SODIUM 5000 [USP'U]/ML
5000 INJECTION INTRAVENOUS; SUBCUTANEOUS EVERY 8 HOURS
Refills: 0 | Status: DISCONTINUED | OUTPATIENT
Start: 2021-03-23 | End: 2021-03-25

## 2021-03-23 RX ORDER — CARVEDILOL PHOSPHATE 80 MG/1
25 CAPSULE, EXTENDED RELEASE ORAL EVERY 12 HOURS
Refills: 0 | Status: DISCONTINUED | OUTPATIENT
Start: 2021-03-23 | End: 2021-03-25

## 2021-03-23 RX ADMIN — LISINOPRIL 2.5 MILLIGRAM(S): 2.5 TABLET ORAL at 08:11

## 2021-03-23 RX ADMIN — Medication 324 MILLIGRAM(S): at 03:45

## 2021-03-23 RX ADMIN — TENOFOVIR DISOPROXIL FUMARATE 300 MILLIGRAM(S): 300 TABLET, FILM COATED ORAL at 08:11

## 2021-03-23 RX ADMIN — FAMOTIDINE 20 MILLIGRAM(S): 10 INJECTION INTRAVENOUS at 05:02

## 2021-03-23 RX ADMIN — Medication 30 MILLILITER(S): at 05:02

## 2021-03-23 RX ADMIN — HEPARIN SODIUM 5000 UNIT(S): 5000 INJECTION INTRAVENOUS; SUBCUTANEOUS at 21:47

## 2021-03-23 NOTE — ED ADULT NURSE REASSESSMENT NOTE - NS ED NURSE REASSESS COMMENT FT1
17.30 Pt is evaluated by  [facility]  MD Madison.  Pt advised for CT abdomen to R/O  LUQ pain . Pt has Chronic H/O   mesenteric Artery   Dissection .  Oral Contrast prep started . Pt is advised not to eat until completion of CT abdomen
Pt received from KODY Larose. Pt oriented to CDU & plan of care was discussed. Pt denies any chest pain, SOB, dizziness or palpitations. Pt states he is feeling much better. Pt endorses epigastric burning. To be medicated as per MAR. Safety & comfort measures maintained. Call bell in reach. Will continue to monitor.
Pt received from KODY Arana. Pt oriented to CDU & plan of care was discussed. Pt denies any chest pain, SOB, dizziness or palpitations. Pt states he is feeling well and denies any epigastric burning or abdominal pain. Safety & comfort measures maintained. Call bell in reach. Will continue to monitor.
07.00 Am Received the Pt from  KODY Crain . Pt is Observed for chest pain for nuclear stress test . Received the Pt A&OX 4 obeys commands Kenyetta N/V/D fever chills cp SOB   Comfort care & safety measures continued  IV site looks clean & dry no signs of infiltration noted pt denies  pain IV site .  Pt is advised to call for help  call bell with in the reach pt verbalized the understanding .  pending CDU  MD batista . GCS 15/15 A&OX 4 PERRLA  size 3 Strong upper & lower extremities steady gait   No facial droop  No Hand Leg drop denies numbness tingling Continue to monitor

## 2021-03-23 NOTE — H&P ADULT - HISTORY OF PRESENT ILLNESS
65 male, Hx: HTN, non-smoker, Liver cirrhosis , gastric varices , chronic superior mesenteric  artery dissection - presents with left sided chest pain/ LUQ pain intermittently for two days , non-exertional in presentation, with associated nausea but no vomitting .  Denies any SOB, or palpitations. no fever or chills   no cough   Denies prior history of PE/DVT, no recent surgeries, denies any calf tenderness/swelling/erythema. Meds: Lisinopril, Carvedilol, Tenofovir.  In ED, patient had ekg no signs of acute ischemia, troponin 8, chest x ray no signs of acute pathology, negative COVID. Pt sent to CDU . stress abnormal.

## 2021-03-23 NOTE — ED CDU PROVIDER INITIAL DAY NOTE - MEDICAL DECISION MAKING DETAILS
CP x 3 hrs, no assoc sob, diaphoresis, but endorses mild nausea and some epigastric pain. denies leg pain/swelling. on exam, very well appearing, stable vitals, benign card/pulm exam. ekg without acute findings. trop neg in ED. pt placed in CDU for ACS w/u.

## 2021-03-23 NOTE — ED PROVIDER NOTE - PHYSICAL EXAMINATION
GEN - NAD; non-toxic; A+Ox3, speaking full sentences, steady gait   HENT - NC/AT, No visible Ecchymosis, No Abrasions, No Lac/Tears, MMM, no discharge  EYES - EOMI, PERRL, no conjunctival pallor, no scleral icterus  NECK - Neck supple, No LAD, No Swelling  PULM - CTA B/L,  symmetric breath sounds  CV -  RRR, S1 S2, no murmurs 2+ Pulses B/L UE  GI - (-) Quintana's, (+) Epigastric TTP; soft, no guarding, no rebound, no masses    MSK/EXT- no edema, no gross deformity, warm and well perfused, no calf tenderness/swelling/erythema   SKIN - no rash or bruising  NEUROLOGIC - alert, moving all 4 ext with 5/5 Strength

## 2021-03-23 NOTE — ED CDU PROVIDER DISPOSITION NOTE - CLINICAL COURSE
65 male, Hx: HTN, non-smoker, Liver Disease - presents with left sided chest pain, non-radiating, non-exertional in presentation, with associated nausea. Denies any SOB, abdominal pain, vomiting, diarrhea, constipation, bloody stools, dysuric symptoms. Denies prior history of PE/DVT, no recent surgeries, denies any calf tenderness/swelling/erythema. Last stress 10 years prior. Meds: Lisinopril, Carvedilol, Tenofovir.  In ED, patient had ekg no signs of acute ischemia, troponin 8, chest x ray no signs of acute pathology, negative COVID. Pt sent to CDU for frequent reeval, vitals q 4hrs, telemetry monitoring and Nuclear stress 65 male, Hx: HTN, non-smoker, Liver Disease - presents with left sided chest pain, non-radiating, non-exertional in presentation, with associated nausea. Denies any SOB, abdominal pain, vomiting, diarrhea, constipation, bloody stools, dysuric symptoms. Denies prior history of PE/DVT, no recent surgeries, denies any calf tenderness/swelling/erythema. Last stress 10 years prior. Meds: Lisinopril, Carvedilol, Tenofovir.  In ED, patient had ekg no signs of acute ischemia, troponin 8, chest x ray no signs of acute pathology, negative COVID. Pt sent to CDU for frequent reeval, vitals q 4hrs, telemetry monitoring and Nuclear stress.  stress abnormal. spoke with Cards- admit to medicine Dr. Escalante, first get CT abdomen to r/o intrabdominal pathology to explains symptoms, likely cath in 48hrs after contrast today.

## 2021-03-23 NOTE — ED PROVIDER NOTE - OBJECTIVE STATEMENT
65 male, Hx: HTN, non-smoker, Liver Disease - presents with left sided chest pain, non-radiating, non-exertional in presentation, with associated nausea. Denies any SOB, abdominal pain, vomiting, diarrhea, constipation, bloody stools, dysuric symptoms. Denies prior history of PE/DVT, no recent surgeries/hosp, denies any calf tenderness/swelling/erythema. Last stress 10 years prior.     Meds: Lisinopril, Carvedilol, Tenofovir.

## 2021-03-23 NOTE — ED CDU PROVIDER INITIAL DAY NOTE - OBJECTIVE STATEMENT
65 male, Hx: HTN, non-smoker, Liver Disease - presents with left sided chest pain, non-radiating, non-exertional in presentation, with associated nausea. Denies any SOB, abdominal pain, vomiting, diarrhea, constipation, bloody stools, dysuric symptoms. Denies prior history of PE/DVT, no recent surgeries, denies any calf tenderness/swelling/erythema. Last stress 10 years prior. Meds: Lisinopril, Carvedilol, Tenofovir.  In ED, patient had ekg no signs of acute ischemia, troponin 8, chest x ray no signs of acute pathology, negative COVID. Pt sent to CDU for frequent reeval, vitals q 4hrs, telemetry monitoring and Nuclear stress

## 2021-03-23 NOTE — ED PROVIDER NOTE - ATTENDING CONTRIBUTION TO CARE
CP x 3 hrs, no assoc sob, diaphoresis, but endorses mild nausea and some epigastric pain. denies leg pain/swelling. on exam, very well appearing, stable vitals, bening card/pulm exam. ekg without acute findings. will undergo ACS r/o, r/o pancreatitis or gi pathology. if neg, will offer cDU for provocative testing as pt has not had any recently.
(1) minimal assist, teach one side; mother does other, staff holds

## 2021-03-23 NOTE — ED ADULT NURSE NOTE - NSIMPLEMENTINTERV_GEN_ALL_ED
Implemented All Universal Safety Interventions:  Coleman to call system. Call bell, personal items and telephone within reach. Instruct patient to call for assistance. Room bathroom lighting operational. Non-slip footwear when patient is off stretcher. Physically safe environment: no spills, clutter or unnecessary equipment. Stretcher in lowest position, wheels locked, appropriate side rails in place.

## 2021-03-23 NOTE — ED CDU PROVIDER INITIAL DAY NOTE - PROGRESS NOTE DETAILS
CDU PROGRESS NOTE PA ERIC: Pt resting comfortably, NAD, VSS. No events on telemetry. Pt endorses epigastric burning, denies chest pain. Abdomen soft, non tender. No rebound or guarding. Negative Quintana's sign. Repeat Troponin 8---7. Will give Pepcid 20mg po and Maalox 30ml po. CDU NOTE AUSTYN Roman: pt resting comfortably, feels well without complaint. had recent episode of L side upper abdominal pain that he referred to as a "zap of pain", now feels ok. no other complaints. NAD VSS. no events on tele. abdomen- soft, NT/ND. chest wall - non-tender. no rash. Endorsed to Dr ENOCH Yee MD, Facep CDU NOTE PA Jessie: pt in stress test. CDU NOTE AUSTYN Roman: stress test abnormal. spoke with Dr. Madison- Cardiology- evaluated pt, recommends admission for cath but would recommend CT abdomen first to make sure no intrabdominal pathology to explain his symptoms. per Dr. Madison- admit to Dr. Escalante- as per Dr. Escalante, agrees with Dr. Madison on getting CT abdomen now first, can admit to his service.    Dr. Almanza, aware and ok with plan/admission.

## 2021-03-23 NOTE — ED CDU PROVIDER INITIAL DAY NOTE - PHYSICAL EXAMINATION
GEN - NAD; non-toxic; A+Ox3, speaking full sentences, steady gait   HENT - NC/AT, No visible Ecchymosis, No Abrasions, No Lac/Tears, MMM, no discharge  EYES - EOMI, PERRL, no conjunctival pallor, no scleral icterus  NECK - Neck supple, No LAD, No Swelling  PULM - CTA B/L,  symmetric breath sounds  CV -  RRR, S1 S2, no murmurs 2+ Pulses B/L UE  GI - Abdomen soft, non distended, non tender. No guarding, no rebound, no masses    MSK/EXT- no edema, no gross deformity, warm and well perfused, no calf tenderness/swelling/erythema   SKIN - no rash or bruising  NEUROLOGIC - alert, moving all 4 ext with 5/5 Strength

## 2021-03-23 NOTE — ED PROVIDER NOTE - CLINICAL SUMMARY MEDICAL DECISION MAKING FREE TEXT BOX
65 male, Hx: HTN, non-smoker, Liver Disease - presents with left sided chest pain, non-radiating, non-exertional in presentation, with associated nausea. Exam, presentation, and history concerning for likely Reflux vs. Gastritis vs. PUD - however given age, HTN - will r/o ACS; eval not consistent with PE, PTX, PNA, Dissection. Plan: CBC, CMP, EKG, CXR Trop, COVID, Lipase, GI Cocktail. Likely CDU for Stress.

## 2021-03-23 NOTE — ED PROVIDER NOTE - NS ED ROS FT
Constitution: No Fever or chills, No Weight Loss,   HEENT: No cough, No Discharge, No Rhinorrhea, No URI symptoms  Cardio: (+) Chest pain, No Palpitations, No Dyspnea  Resp: No SOB, No Wheezing  GI: No abdominal pain, (+) Nausea, No Vomiting, No Constipation, No Diarrhea  : No burning upon urination, trouble urinating, no foul odor from urine  MSK: No Back pain, No Numbness, No Tingling, No Weakness  Neuro: No Headache, No changes to Vision, No changes to Hearing, Normal Gait  Skin: No rashes, No Bruising, No Swelling

## 2021-03-23 NOTE — H&P ADULT - ASSESSMENT
65 male, Hx: HTN, non-smoker, Liver cirrhosis , gastric varices , chronic superior mesenteric  artery dissection - presents with left sided chest pain/ LUQ pain intermittently for two days , non-exertional in presentation, with associated nausea but no vomitting .  Denies any SOB, or palpitations. no fever or chills   no cough   Denies prior history of PE/DVT, no recent surgeries, denies any calf tenderness/swelling/erythema. Meds: Lisinopril, Carvedilol, Tenofovir.  In ED, patient had ekg no signs of acute ischemia, troponin 8, chest x ray no signs of acute pathology, negative COVID. Pt sent to CDU . stress abnormal.     - L sided CP : discussed w Dr. Madison: will plan cath however given LUQ pain in addition : will check CT abd ( especially w hx of superior mesentric dissection hx )   will hold on ASA for now given hx of varices   hold ACe for now given need for contrast with CT and cath     - HTN : cont meds     - hep B : cont meds

## 2021-03-23 NOTE — CONSULT NOTE ADULT - SUBJECTIVE AND OBJECTIVE BOX
CHIEF COMPLAINT:Patient is a 65y old  Male who presents with a chief complaint of     HISTORY OF PRESENT ILLNESS:    65 male with history as below presents complaining of left sided upper abd pain   stress test is done by ED abnormal   pt denies any chest pain, sob, palpitation, dizziness or syncope.      PAST MEDICAL & SURGICAL HISTORY:  Liver disease    Hypertension    No significant past surgical history            MEDICATIONS:  carvedilol 25 milliGRAM(s) Oral every 12 hours  heparin   Injectable 5000 Unit(s) SubCutaneous every 8 hours  lisinopril 2.5 milliGRAM(s) Oral daily    tenofovir disoproxil fumarate (VIREAD) 300 milliGRAM(s) Oral daily                FAMILY HISTORY:  No pertinent family history in first degree relatives        Non-contributory    SOCIAL HISTORY:    No tobacco, drugs or etoh    Allergies    No Known Allergies    Intolerances    	    REVIEW OF SYSTEMS:  as above  The rest of the 14 points ROS reviewed and except above they are unremarkable.        PHYSICAL EXAM:  T(C): 36.7 (03-23-21 @ 16:08), Max: 37 (03-23-21 @ 03:40)  HR: 65 (03-23-21 @ 16:08) (55 - 65)  BP: 118/77 (03-23-21 @ 16:08) (112/75 - 137/91)  RR: 19 (03-23-21 @ 16:08) (17 - 19)  SpO2: 98% (03-23-21 @ 16:08) (95% - 99%)  Wt(kg): --  I&O's Summary    JVP: Normal  Neck: supple  Lung: clear   CV: S1 S2 , Murmur:  Abd: soft  Ext: No edema  neuro: Awake / alert  Psych: flat affect  Skin: normal      LABS/DATA:    TELEMETRY: 	    ECG:  	   	  CARDIAC MARKERS:    < from: Nuclear Stress Test-Pharmacologic (03.23.21 @ 14:53) >  wall.  ------------------------------------------------------------------------  IMPRESSIONS:Abnormal Study  * Chest Pain: No chest pain with administration of  Regadenoson.  * Symptom: Mild shortness of breath 2 minutes after  Regadenoson administration, resolved without intervention  by end of study.  * HR Response: Appropriate.  * BP Response: Appropriate.  * Heart Rhythm: Sinus Rhythm - 67 BPM.  * Baseline ECG: Nonspecific ST-T wave abnormality.  * ECG Changes: No significant ischemic ST segment changes  beyond baseline abnormalities.  * Arrhythmia: None.  * Review of raw data shows: Minor motion artifact.  * The left ventricle was normal in size.  * There are medium-sized, mild to moderate defects in the  basal inferior, basal inferolateral, and basal  inferoseptal walls that are mostly fixed suggestive of  infarct with minimal bernadine-infarct ischemia.  * There is a small, mild to moderate defect in the  inferoapical wall that is mostly fixed suggestive of  infarct with mild bernadine-infarct ischemia.  * Post-stress gated wall motion analysis was performed  (LVEF = 56 %;LVEDV = 100 ml.) revealing hypokinesis of the  basal inferior, basal inferoseptal, and basal  inferolateral walls and reduced systolicthickening of the  inferoapical wall.  *** No previous Nuclear/Stress exam.  ------------------------------------------------------------------------  Confirmed on  3/23/2021 - 15:13:35 by Bahman Irwin M.D.  ------------------------------------------------------------------------    < end of copied text >                      7 <<== 03-23-21 @ 02:34                8 <<== 03-23-21 @ 00:26                              13.1   4.54  )-----------( 99       ( 23 Mar 2021 00:26 )             39.3     03-23    140  |  105  |  23  ----------------------------<  125<H>  3.9   |  26  |  0.79    Ca    9.2      23 Mar 2021 00:26    TPro  6.4  /  Alb  3.9  /  TBili  0.4  /  DBili  x   /  AST  31  /  ALT  36  /  AlkPhos  55  03-23    proBNP:   Lipid Profile:   HgA1c:   TSH:

## 2021-03-23 NOTE — ED ADULT NURSE NOTE - OBJECTIVE STATEMENT
65yM presents to the ED from home with CP. Pt states CP started ~3hrs ago, sudden onset, rated 7/10 when occurs. Associated epigastric pain and nausea (no vomiting). Pt states pain started not long after eating. No SOB, diaphoresis, arm pain or numbness/tingling of extremitites, no palpitations, syncope, lightheadedness/dizziness associated. Pt states pain decreased with no intervention and pt reports 2/10 epigastric pain upon arrival to ED, no nausea at this time. Pt AAOx4, VS as documented. Abdomen soft, nondistended and nontender to palpation. No increased WOB or labored respirations. Pt ambulatory in ED with steady gait. IV placed and labs drawn and sent. Pt placed on cardiac monitor (NSR noted at this time). Bed locked in lowest position with appropriate side rails raised. Pt has no other questions or concerns at this time.

## 2021-03-23 NOTE — PATIENT PROFILE ADULT - NSPROPOAURINARYCATHETER_GEN_A_NUR
PHYSICIAN NEXT STEPS:  Review Only    CHIEF COMPLAINT:  Chief Complaint/Protocol Used: Eye - Pus or Discharge  Onset: started yesterday      ASSESSMENT:  ? Onset: started yesterday  ? Normal True  ? Normal, no trouble breathing True  ? Eye Discharge: right eye, white/yellow discharge  ? Redness Of Sclera: right eye, yesterday  ? Eyelids: denies  ? Vision: blurred vision  ? Pain: moderate  ? Contact Lens: denies  ? Other Symptoms: denies  -------------------------------------------------------    DISPOSITION:  Disposition Recommendation: See Physician within 4 Hours (or PCP triage)  Questions that led to disposition:  ? MODERATE eye pain (e.g., interferes with normal activities)  Patient Directed To: Unspecified  Patient Intended Action: Go to Urgent Care Center      CALL NOTES:  09/13/2020 at 12:23 PM by Boni Wooten  ? patient agreed with care advice and 4 hour disposition, nurse advised urgent care, patient agreed    DISPOSITION OVERRIDE/PROVIDER CONSULT:  Disposition Override: N/A  Override Source: Unspecified  Consulted with PCP: No  Consulted with On-Call Physician: No    CALLER CONTACT INFO:  Name: Karen Tan (Self)  Phone 1: [phone] (Mobile)  Phone 2: [phone] (Home Phone) - Preferred      ENCOUNTER STARTED:  09/13/20 12:17:55 PM  ENCOUNTER ASSIGNED TO/CLOSED BY:  Boni Wooten @ 09/13/20 12:23:36 PM      -------------------------------------------------------    CARE ADVICE given per Eye - Pus or Discharge guideline.  SEE PHYSICIAN WITHIN 4 HOURS (or PCP triage):   * IF OFFICE WILL BE OPEN: You need to be seen within the next 3 or 4 hours. Call your doctor's office now or as soon as it opens.   * IF OFFICE WILL BE CLOSED AND NO PCP TRIAGE: You need to be seen within the next 3 or 4 hours. A nearby Urgent Care Center is often a good source of care. Another choice is to go to the ER. Go sooner if you become worse.  * IF OFFICE WILL BE CLOSED AND PCP TRIAGE REQUIRED: You may need to be seen. Your  no

## 2021-03-23 NOTE — ED CDU PROVIDER INITIAL DAY NOTE - DETAILS
CHEST PAIN  -[facility]  -REESEMI  -[facility] EVAL  -STRESS TEST  -CASE D/W ATTENDING

## 2021-03-23 NOTE — ED ADULT NURSE REASSESSMENT NOTE - COMFORT CARE
meal provided/plan of care explained/po fluids offered
plan of care explained/po fluids offered
ambulated to bathroom/plan of care explained/po fluids offered

## 2021-03-23 NOTE — ED PROVIDER NOTE - PROGRESS NOTE DETAILS
Resident Mike: preliminary evaluation without any acute evidence of ACS; however patient will require further provocative testing (Stress/TTE). Pt amenable to staying in CDU for further testing. Case endorsed to CDU PA (Cong).

## 2021-03-23 NOTE — ED CDU PROVIDER DISPOSITION NOTE - ATTENDING CONTRIBUTION TO CARE
Private Physician [address]/pcp  65y male PMH HTN,Hep B, Non smoker. Pt comes to ed c/o left chest pain. "little pain,while walking" 2/10.localized to left chest without radiation. No loc,dizziness, nvdc,sweats. Possibly occurred previously pt thought might be a pulle muscle. Pains brief fleeting waxing and waning. PE WDWN male nad ncat neck supple chest clear anterior & posterior cv no rubs, gallops or murmurs abd soft +bs no mass guarding neruo no focal defects  Vahid Yee MD, Facep Private Physician [address]/pcp  65y male PMH HTN,Hep B, Non smoker. Pt comes to ed c/o left chest pain. "little pain,while walking" 2/10.localized to left chest without radiation. No loc,dizziness, nvdc,sweats. Possibly occurred previously pt thought might be a pulle muscle. Pains brief fleeting waxing and waning. PE WDWN male nad ncat neck supple chest clear anterior & posterior cv no rubs, gallops or murmurs abd soft +bs no mass guarding neruo no focal defects  Plan: Nuc stress and reasssess.  Vahid Yee MD, Facep Private Physician [address]/pcp  65y male PMH HTN,Hep B, Non smoker. Pt comes to ed c/o left chest pain. "little pain,while walking" 2/10.localized to left chest without radiation. No loc,dizziness, nvdc,sweats. Possibly occurred previously pt thought might be a pulle muscle. Pains brief fleeting waxing and waning. PE WDWN male nad ncat neck supple chest clear anterior & posterior cv no rubs, gallops or murmurs abd soft +bs no mass guarding neruo no focal defects  Plan: Nuc stress and reasssess.  Vahid Yee MD, Facep    Dr. Almanza Note: pt to be admitted for abnormal stress test along with ct abdomen for possible abdominal etiology of L lower chest pain. Stable for admission.

## 2021-03-24 LAB
ANION GAP SERPL CALC-SCNC: 14 MMOL/L — SIGNIFICANT CHANGE UP (ref 5–17)
BASOPHILS # BLD AUTO: 0.02 K/UL — SIGNIFICANT CHANGE UP (ref 0–0.2)
BASOPHILS NFR BLD AUTO: 0.4 % — SIGNIFICANT CHANGE UP (ref 0–2)
BUN SERPL-MCNC: 17 MG/DL — SIGNIFICANT CHANGE UP (ref 7–23)
CALCIUM SERPL-MCNC: 9.2 MG/DL — SIGNIFICANT CHANGE UP (ref 8.4–10.5)
CHLORIDE SERPL-SCNC: 101 MMOL/L — SIGNIFICANT CHANGE UP (ref 96–108)
CO2 SERPL-SCNC: 23 MMOL/L — SIGNIFICANT CHANGE UP (ref 22–31)
COVID-19 SPIKE DOMAIN AB INTERP: POSITIVE
COVID-19 SPIKE DOMAIN ANTIBODY RESULT: >250 U/ML — HIGH
CREAT SERPL-MCNC: 0.84 MG/DL — SIGNIFICANT CHANGE UP (ref 0.5–1.3)
EOSINOPHIL # BLD AUTO: 0.26 K/UL — SIGNIFICANT CHANGE UP (ref 0–0.5)
EOSINOPHIL NFR BLD AUTO: 5.3 % — SIGNIFICANT CHANGE UP (ref 0–6)
GLUCOSE SERPL-MCNC: 96 MG/DL — SIGNIFICANT CHANGE UP (ref 70–99)
HCT VFR BLD CALC: 41.2 % — SIGNIFICANT CHANGE UP (ref 39–50)
HCV AB S/CO SERPL IA: 0.04 S/CO — SIGNIFICANT CHANGE UP (ref 0–0.99)
HCV AB SERPL-IMP: SIGNIFICANT CHANGE UP
HGB BLD-MCNC: 13.6 G/DL — SIGNIFICANT CHANGE UP (ref 13–17)
IMM GRANULOCYTES NFR BLD AUTO: 0.2 % — SIGNIFICANT CHANGE UP (ref 0–1.5)
LYMPHOCYTES # BLD AUTO: 1.77 K/UL — SIGNIFICANT CHANGE UP (ref 1–3.3)
LYMPHOCYTES # BLD AUTO: 36 % — SIGNIFICANT CHANGE UP (ref 13–44)
MCHC RBC-ENTMCNC: 28.7 PG — SIGNIFICANT CHANGE UP (ref 27–34)
MCHC RBC-ENTMCNC: 33 GM/DL — SIGNIFICANT CHANGE UP (ref 32–36)
MCV RBC AUTO: 86.9 FL — SIGNIFICANT CHANGE UP (ref 80–100)
MONOCYTES # BLD AUTO: 0.42 K/UL — SIGNIFICANT CHANGE UP (ref 0–0.9)
MONOCYTES NFR BLD AUTO: 8.5 % — SIGNIFICANT CHANGE UP (ref 2–14)
NEUTROPHILS # BLD AUTO: 2.44 K/UL — SIGNIFICANT CHANGE UP (ref 1.8–7.4)
NEUTROPHILS NFR BLD AUTO: 49.6 % — SIGNIFICANT CHANGE UP (ref 43–77)
NRBC # BLD: 0 /100 WBCS — SIGNIFICANT CHANGE UP (ref 0–0)
PLATELET # BLD AUTO: 95 K/UL — LOW (ref 150–400)
POTASSIUM SERPL-MCNC: 4.2 MMOL/L — SIGNIFICANT CHANGE UP (ref 3.5–5.3)
POTASSIUM SERPL-SCNC: 4.2 MMOL/L — SIGNIFICANT CHANGE UP (ref 3.5–5.3)
RBC # BLD: 4.74 M/UL — SIGNIFICANT CHANGE UP (ref 4.2–5.8)
RBC # FLD: 13 % — SIGNIFICANT CHANGE UP (ref 10.3–14.5)
SARS-COV-2 IGG+IGM SERPL QL IA: >250 U/ML — HIGH
SARS-COV-2 IGG+IGM SERPL QL IA: POSITIVE
SODIUM SERPL-SCNC: 138 MMOL/L — SIGNIFICANT CHANGE UP (ref 135–145)
WBC # BLD: 4.92 K/UL — SIGNIFICANT CHANGE UP (ref 3.8–10.5)
WBC # FLD AUTO: 4.92 K/UL — SIGNIFICANT CHANGE UP (ref 3.8–10.5)

## 2021-03-24 PROCEDURE — 99152 MOD SED SAME PHYS/QHP 5/>YRS: CPT

## 2021-03-24 PROCEDURE — 93454 CORONARY ARTERY ANGIO S&I: CPT | Mod: 26

## 2021-03-24 PROCEDURE — 99221 1ST HOSP IP/OBS SF/LOW 40: CPT

## 2021-03-24 RX ORDER — SODIUM CHLORIDE 9 MG/ML
1000 INJECTION INTRAMUSCULAR; INTRAVENOUS; SUBCUTANEOUS
Refills: 0 | Status: DISCONTINUED | OUTPATIENT
Start: 2021-03-24 | End: 2021-03-25

## 2021-03-24 RX ORDER — SODIUM CHLORIDE 9 MG/ML
1000 INJECTION INTRAMUSCULAR; INTRAVENOUS; SUBCUTANEOUS
Refills: 0 | Status: DISCONTINUED | OUTPATIENT
Start: 2021-03-24 | End: 2021-03-24

## 2021-03-24 RX ADMIN — CARVEDILOL PHOSPHATE 25 MILLIGRAM(S): 80 CAPSULE, EXTENDED RELEASE ORAL at 17:21

## 2021-03-24 RX ADMIN — SODIUM CHLORIDE 100 MILLILITER(S): 9 INJECTION INTRAMUSCULAR; INTRAVENOUS; SUBCUTANEOUS at 17:20

## 2021-03-24 RX ADMIN — HEPARIN SODIUM 5000 UNIT(S): 5000 INJECTION INTRAVENOUS; SUBCUTANEOUS at 05:47

## 2021-03-24 RX ADMIN — TENOFOVIR DISOPROXIL FUMARATE 300 MILLIGRAM(S): 300 TABLET, FILM COATED ORAL at 13:24

## 2021-03-24 RX ADMIN — LISINOPRIL 2.5 MILLIGRAM(S): 2.5 TABLET ORAL at 05:47

## 2021-03-24 NOTE — PROGRESS NOTE ADULT - ASSESSMENT
Abnormal stress test  will plan for cath today     abd pain   resolved  prelim CT is unremarkable   consider GI eval     HTN  cont current meds        Advanced care planning was discussed with patient and family.  Risks, benefits and alternatives of the cardiac treatments and medical therapy including procedures were discussed in detail and all questions were answered. Importance of compliance with medical therapy and lifestyle modification to improve cardiovascular health were addressed. Appropriate forms and patient educational materials were reviewed. 30 minutes face to face spent.     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  Risks, benefits and alternatives of the cardiac treatments and medical therapy including procedures were discussed in detail and all questions were answered. Importance of compliance with medical therapy and lifestyle modification to improve cardiovascular health were addressed. Appropriate forms and patient educational materials were reviewed. 30 minutes face to face spent.

## 2021-03-24 NOTE — CONSULT NOTE ADULT - SUBJECTIVE AND OBJECTIVE BOX
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 The patient had negative cardiac workup including trop and EKG. The patient underwent cardiac cath today which was negative, and vascular surgery was called for possible pain related to sma dissection noted again on CT abdomen. The patient denies any nausea or vomiting, denies any cramping. he Usually has no issues eating or drinking at home and denies any constipation or diarrhea, denies any blood per rectum. He denies prior history of PE/DVT, no recent surgeries, denies any calf tenderness/swelling/erythema.     PAST MEDICAL & SURGICAL HISTORY:  Cirrhosis with gastric varices  Hypertension    No significant past surgical history        FAMILY HISTORY:  No pertinent family history in first degree relatives    SOCIAL HISTORY:  Does not smoke      Allergies    No Known Allergies    Intolerances        Vital Signs Last 24 Hrs  T(C): 36.6 (24 Mar 2021 15:49), Max: 36.7 (24 Mar 2021 01:24)  T(F): 97.8 (24 Mar 2021 15:49), Max: 98.1 (24 Mar 2021 01:24)  HR: 78 (24 Mar 2021 15:49) (51 - 78)  BP: 96/65 (24 Mar 2021 15:49) (96/65 - 133/93)  BP(mean): --  RR: 18 (24 Mar 2021 15:49) (11 - 22)  SpO2: 95% (24 Mar 2021 15:49) (90% - 99%)  Daily     Daily Weight in k.4 (24 Mar 2021 04:22)    Examination  General: NAD  HEENT: Atraumatic, EOMI  Resp: Breathing comfortably on RA  CV: Normal sinus rhythm  Abd: soft, nontender, nondistended  Ext: ROMIx4, motor strength intact x 4                          13.6   4.92  )-----------( 95       ( 24 Mar 2021 06:49 )             41.2     03-24    138  |  101  |  17  ----------------------------<  96  4.2   |  23  |  0.84    Ca    9.2      24 Mar 2021 06:49    TPro  6.4  /  Alb  3.9  /  TBili  0.4  /  DBili  x   /  AST  31  /  ALT  36  /  AlkPhos  55            Radiographic Findings:   < from: CT Abdomen and Pelvis w/ Oral Cont and w/ IV Cont (21 @ 18:42) >  FINDINGS:  LOWER CHEST: Within normal limits.    LIVER: Within normal limits.  BILE DUCTS: Normal caliber.  GALLBLADDER: Within normal limits.  SPLEEN: Within normal limits.  PANCREAS: Within normal limits.  ADRENALS: Within normal limits.  KIDNEYS/URETERS: Within normal limits.    BLADDER: Within normal limits.  REPRODUCTIVE ORGANS: Prostate within normal limits.    BOWEL: No bowel obstruction. Appendix is normal.  PERITONEUM: No ascites.  VESSELS: Atherosclerotic calcifications. Chronic appearing dissection in the superior mesenteric artery. Superior mesenteric artery measures up to 1.5 cm in diameter, unchanged.  RETROPERITONEUM/LYMPH NODES: No lymphadenopathy.  ABDOMINAL WALL: Fat-containing right inguinal hernia.  BONES: Degenerative changes.    IMPRESSION:  Chronic appearing dissection flap in the superior mesenteric artery. Unchanged dilatation of the superior mesenteric artery, which measures 1.5 cm.    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     Plan:  - recommend antiplatelet therapy if deemed appropriate by cardiology, plavix appropriate  - will monitor for clinical improvement, please alert vascular team to change in clinical status    Vascular Surgery  [phone]                 Mastoid Interpolation Flap Text: A decision was made to reconstruct the defect utilizing an interpolation axial flap and a staged reconstruction.  A telfa template was made of the defect.  This telfa template was then used to outline the mastoid interpolation flap.  The donor area for the pedicle flap was then injected with anesthesia.  The flap was excised through the skin and subcutaneous tissue down to the layer of the underlying musculature.  The pedicle flap was carefully excised within this deep plane to maintain its blood supply.  The edges of the donor site were undermined.   The donor site was closed in a primary fashion.  The pedicle was then rotated into position and sutured.  Once the tube was sutured into place, adequate blood supply was confirmed with blanching and refill.  The pedicle was then wrapped with xeroform gauze and dressed appropriately with a telfa and gauze bandage to ensure continued blood supply and protect the attached pedicle. [facility] Vascular Surgery Consultation     Patient is a 65y old  Male who presents with a chief complaint of abdominal pain    HPI:  65 year old with PMH of HTN, cirrhosis with varices, and documented chronic SMA dissection from at least  who presented with abdominal pain int he left flank and left upper quadrant. The patient had negative cardiac workup including trop and EKG. The patient underwent cardiac cath today which was negative, and vascular surgery was called for possible pain related to sma dissection noted again on CT abdomen. The patient denies any nausea or vomiting, denies any cramping. he Usually has no issues eating or drinking at home and denies any constipation or diarrhea, denies any blood per rectum. He denies prior history of PE/DVT, no recent surgeries, denies any calf tenderness/swelling/erythema.     PAST MEDICAL & SURGICAL HISTORY:  Cirrhosis with gastric varices  Hypertension    No significant past surgical history        FAMILY HISTORY:  No pertinent family history in first degree relatives    SOCIAL HISTORY:  Does not smoke      Allergies    No Known Allergies    Intolerances        Vital Signs Last 24 Hrs  T(C): 36.6 (24 Mar 2021 15:49), Max: 36.7 (24 Mar 2021 01:24)  T(F): 97.8 (24 Mar 2021 15:49), Max: 98.1 (24 Mar 2021 01:24)  HR: 78 (24 Mar 2021 15:49) (51 - 78)  BP: 96/65 (24 Mar 2021 15:49) (96/65 - 133/93)  BP(mean): --  RR: 18 (24 Mar 2021 15:49) (11 - 22)  SpO2: 95% (24 Mar 2021 15:49) (90% - 99%)  Daily     Daily Weight in k.4 (24 Mar 2021 04:22)    Examination  General: NAD  HEENT: Atraumatic, EOMI  Resp: Breathing comfortably on RA  CV: Normal sinus rhythm  Abd: soft, nontender, nondistended  Ext: ROMIx4, motor strength intact x 4                          13.6   4.92  )-----------( 95       ( 24 Mar 2021 06:49 )             41.2     03-24    138  |  101  |  17  ----------------------------<  96  4.2   |  23  |  0.84    Ca    9.2      24 Mar 2021 06:49    TPro  6.4  /  Alb  3.9  /  TBili  0.4  /  DBili  x   /  AST  31  /  ALT  36  /  AlkPhos  55            Radiographic Findings:   < from: CT Abdomen and Pelvis w/ Oral Cont and w/ IV Cont (21 @ 18:42) >  FINDINGS:  LOWER CHEST: Within normal limits.    LIVER: Within normal limits.  BILE DUCTS: Normal caliber.  GALLBLADDER: Within normal limits.  SPLEEN: Within normal limits.  PANCREAS: Within normal limits.  ADRENALS: Within normal limits.  KIDNEYS/URETERS: Within normal limits.    BLADDER: Within normal limits.  REPRODUCTIVE ORGANS: Prostate within normal limits.    BOWEL: No bowel obstruction. Appendix is normal.  PERITONEUM: No ascites.  VESSELS: Atherosclerotic calcifications. Chronic appearing dissection in the superior mesenteric artery. Superior mesenteric artery measures up to 1.5 cm in diameter, unchanged.  RETROPERITONEUM/LYMPH NODES: No lymphadenopathy.  ABDOMINAL WALL: Fat-containing right inguinal hernia.  BONES: Degenerative changes.    IMPRESSION:  Chronic appearing dissection flap in the superior mesenteric artery. Unchanged dilatation of the superior mesenteric artery, which measures 1.5 cm.    < end of copied text >

## 2021-03-24 NOTE — PROGRESS NOTE ADULT - ASSESSMENT
65 male, Hx: HTN, non-smoker, Liver cirrhosis , gastric varices , chronic superior mesenteric  artery dissection - presents with left sided chest pain/ LUQ pain intermittently for two days , non-exertional in presentation, with associated nausea but no vomitting .  Denies any SOB, or palpitations. no fever or chills   no cough   Denies prior history of PE/DVT, no recent surgeries, denies any calf tenderness/swelling/erythema. Meds: Lisinopril, Carvedilol, Tenofovir.  In ED, patient had ekg no signs of acute ischemia, troponin 8, chest x ray no signs of acute pathology, negative COVID. Pt sent to CDU . stress abnormal.     - L sided CP : discussed w Dr. Madison:  Cath : non obstructive CAD     CT chronic dissection of sup . mesentric artery : d/w Dr. Madison : appreciate [facility] input : will hold off on Antiplt given varices  - HTN : cont meds     - hep B : cont meds

## 2021-03-24 NOTE — CONSULT NOTE ADULT - ASSESSMENT
Abnormal stress test  will plan for cath once etiology of abd pain is sorted out    abd pain   suggest abd CT   consider GI eval     HTN  cont current meds     Discussed with Dr Escalante and Son    60 min spent      Advanced care planning was discussed with patient and family.  Risks, benefits and alternatives of the cardiac treatments and medical therapy including procedures were discussed in detail and all questions were answered. Importance of compliance with medical therapy and lifestyle modification to improve cardiovascular health were addressed. Appropriate forms and patient educational materials were reviewed. 30 minutes face to face spent.    
Assessment:   65 year old with PMH of HTN, cirrhosis with varices, and documented  SMA dissection who presented with upper abdominal pain s/p negative cardiac cath.     Plan:  - recommend antiplatelet therapy if cleared by cardiology, plavix   - will monitor         Vascular Surgery  [phone]

## 2021-03-24 NOTE — CHART NOTE - NSCHARTNOTEFT_GEN_A_CORE
Notified by RN that patient had a bradycardia event on tele with HR: 43 for a few seconds and then a pAT with HR: 143. Pt is currently sleeping and vitals are stable.  - planned for cath in AM   - will continue to monitor closely overnight   - will endorse to day team on overnight events   - RN aware of management     Minnie Grullon PA-C   Dept of Medicine   [phone]

## 2021-03-24 NOTE — CONSULT NOTE ADULT - PROBLEM SELECTOR RECOMMENDATION 9
I Heriberto Duke MD performed a history and physical exam of the patient and discussed  the findings and plan with the house officer. I reviewed the resident note and agree with the findings and plan

## 2021-03-24 NOTE — CONSULT NOTE ADULT - ATTENDING COMMENTS
LATESHA Duke MD performed a history and physical exam of the patient and discussed  the findings and plan with the house officer. I reviewed the resident note and agree with the findings and plan   I Heriberto Duke MD have personally seen and examined the patient at bedside today at  4 pm

## 2021-03-25 ENCOUNTER — TRANSCRIPTION ENCOUNTER (OUTPATIENT)
Age: 66
End: 2021-03-25

## 2021-03-25 ENCOUNTER — NON-APPOINTMENT (OUTPATIENT)
Age: 66
End: 2021-03-25

## 2021-03-25 VITALS
OXYGEN SATURATION: 96 % | TEMPERATURE: 99 F | DIASTOLIC BLOOD PRESSURE: 68 MMHG | HEART RATE: 66 BPM | SYSTOLIC BLOOD PRESSURE: 108 MMHG | RESPIRATION RATE: 18 BRPM

## 2021-03-25 DIAGNOSIS — I77.70 DISSECTION OF UNSPECIFIED ARTERY: ICD-10-CM

## 2021-03-25 LAB
ANION GAP SERPL CALC-SCNC: 13 MMOL/L — SIGNIFICANT CHANGE UP (ref 5–17)
BUN SERPL-MCNC: 22 MG/DL — SIGNIFICANT CHANGE UP (ref 7–23)
CALCIUM SERPL-MCNC: 8.5 MG/DL — SIGNIFICANT CHANGE UP (ref 8.4–10.5)
CHLORIDE SERPL-SCNC: 104 MMOL/L — SIGNIFICANT CHANGE UP (ref 96–108)
CO2 SERPL-SCNC: 22 MMOL/L — SIGNIFICANT CHANGE UP (ref 22–31)
CREAT SERPL-MCNC: 0.87 MG/DL — SIGNIFICANT CHANGE UP (ref 0.5–1.3)
GLUCOSE SERPL-MCNC: 94 MG/DL — SIGNIFICANT CHANGE UP (ref 70–99)
POTASSIUM SERPL-MCNC: 4.3 MMOL/L — SIGNIFICANT CHANGE UP (ref 3.5–5.3)
POTASSIUM SERPL-SCNC: 4.3 MMOL/L — SIGNIFICANT CHANGE UP (ref 3.5–5.3)
SODIUM SERPL-SCNC: 139 MMOL/L — SIGNIFICANT CHANGE UP (ref 135–145)

## 2021-03-25 PROCEDURE — U0003: CPT

## 2021-03-25 PROCEDURE — 80061 LIPID PANEL: CPT

## 2021-03-25 PROCEDURE — 83036 HEMOGLOBIN GLYCOSYLATED A1C: CPT

## 2021-03-25 PROCEDURE — 71046 X-RAY EXAM CHEST 2 VIEWS: CPT

## 2021-03-25 PROCEDURE — 36000 PLACE NEEDLE IN VEIN: CPT

## 2021-03-25 PROCEDURE — 99285 EMERGENCY DEPT VISIT HI MDM: CPT | Mod: 25

## 2021-03-25 PROCEDURE — 99232 SBSQ HOSP IP/OBS MODERATE 35: CPT

## 2021-03-25 PROCEDURE — 93454 CORONARY ARTERY ANGIO S&I: CPT

## 2021-03-25 PROCEDURE — G0378: CPT

## 2021-03-25 PROCEDURE — A9500: CPT

## 2021-03-25 PROCEDURE — 86769 SARS-COV-2 COVID-19 ANTIBODY: CPT

## 2021-03-25 PROCEDURE — C1769: CPT

## 2021-03-25 PROCEDURE — 74177 CT ABD & PELVIS W/CONTRAST: CPT

## 2021-03-25 PROCEDURE — 93017 CV STRESS TEST TRACING ONLY: CPT

## 2021-03-25 PROCEDURE — 83690 ASSAY OF LIPASE: CPT

## 2021-03-25 PROCEDURE — 93005 ELECTROCARDIOGRAM TRACING: CPT

## 2021-03-25 PROCEDURE — 99152 MOD SED SAME PHYS/QHP 5/>YRS: CPT

## 2021-03-25 PROCEDURE — 80053 COMPREHEN METABOLIC PANEL: CPT

## 2021-03-25 PROCEDURE — 80048 BASIC METABOLIC PNL TOTAL CA: CPT

## 2021-03-25 PROCEDURE — 78452 HT MUSCLE IMAGE SPECT MULT: CPT

## 2021-03-25 PROCEDURE — 84484 ASSAY OF TROPONIN QUANT: CPT

## 2021-03-25 PROCEDURE — 86803 HEPATITIS C AB TEST: CPT

## 2021-03-25 PROCEDURE — C1887: CPT

## 2021-03-25 PROCEDURE — 85025 COMPLETE CBC W/AUTO DIFF WBC: CPT

## 2021-03-25 PROCEDURE — C1894: CPT

## 2021-03-25 RX ORDER — LISINOPRIL 2.5 MG/1
1 TABLET ORAL
Qty: 0 | Refills: 0 | DISCHARGE
Start: 2021-03-25

## 2021-03-25 RX ORDER — TENOFOVIR DISOPROXIL FUMARATE 300 MG/1
1 TABLET, FILM COATED ORAL
Qty: 0 | Refills: 0 | DISCHARGE
Start: 2021-03-25

## 2021-03-25 RX ORDER — CARVEDILOL PHOSPHATE 80 MG/1
1 CAPSULE, EXTENDED RELEASE ORAL
Qty: 0 | Refills: 0 | DISCHARGE
Start: 2021-03-25

## 2021-03-25 RX ORDER — CARVEDILOL PHOSPHATE 80 MG/1
1 CAPSULE, EXTENDED RELEASE ORAL
Refills: 0 | DISCHARGE
Start: 2021-03-25

## 2021-03-25 RX ADMIN — LISINOPRIL 2.5 MILLIGRAM(S): 2.5 TABLET ORAL at 05:35

## 2021-03-25 RX ADMIN — TENOFOVIR DISOPROXIL FUMARATE 300 MILLIGRAM(S): 300 TABLET, FILM COATED ORAL at 09:14

## 2021-03-25 RX ADMIN — CARVEDILOL PHOSPHATE 25 MILLIGRAM(S): 80 CAPSULE, EXTENDED RELEASE ORAL at 05:35

## 2021-03-25 NOTE — DISCHARGE NOTE PROVIDER - NSDCMRMEDTOKEN_GEN_ALL_CORE_FT
carvedilol 25 mg oral tablet: 1 tab(s) orally every 12 hours  lisinopril 2.5 mg oral tablet: 1 tab(s) orally once a day  tenofovir disoproxil fumarate 300 mg oral tablet: 1 tab(s) orally once a day

## 2021-03-25 NOTE — DISCHARGE NOTE NURSING/CASE MANAGEMENT/SOCIAL WORK - PATIENT PORTAL LINK FT
You can access the FollowMyHealth Patient Portal offered by [facility] by registering at the following website: [email or web address]. By joining [email or web address]’s FollowMyHealth portal, you will also be able to view your health information using other applications (apps) compatible with our system.

## 2021-03-25 NOTE — DISCHARGE NOTE PROVIDER - NSDCFUSCHEDAPPT_GEN_ALL_CORE_FT
PADMINI KNOX ; 05/03/2021 ; NPP Hepatology [address]  PADMINI KNOX ; 06/14/2021 ; NP Med [facility] [address] [facility]

## 2021-03-25 NOTE — PROGRESS NOTE ADULT - SUBJECTIVE AND OBJECTIVE BOX
DATE OF SERVICE: 03-24-21 @ 07:00    Subjective: Patient seen and examined. No new events except as noted.     SUBJECTIVE/ROS:    No chest pain, dyspnea, palpitation, or dizziness.     MEDICATIONS:  MEDICATIONS  (STANDING):  carvedilol 25 milliGRAM(s) Oral every 12 hours  heparin   Injectable 5000 Unit(s) SubCutaneous every 8 hours  lisinopril 2.5 milliGRAM(s) Oral daily  tenofovir disoproxil fumarate (VIREAD) 300 milliGRAM(s) Oral daily      PHYSICAL EXAM:  T(C): 36.7 (03-24-21 @ 04:22), Max: 36.8 (03-23-21 @ 10:59)  HR: 54 (03-24-21 @ 04:22) (54 - 68)  BP: 109/72 (03-24-21 @ 04:22) (106/60 - 132/85)  RR: 18 (03-24-21 @ 04:22) (14 - 19)  SpO2: 94% (03-24-21 @ 04:22) (94% - 99%)  Wt(kg): --  I&O's Summary          JVP: Normal  Neck: supple  Lung: clear   CV: S1 S2 , Murmur:  Abd: soft  Ext: No edema  neuro: Awake / alert  Psych: flat affect  Skin: normal``    LABS/DATA:    CARDIAC MARKERS:                                13.1   4.54  )-----------( 99       ( 23 Mar 2021 00:26 )             39.3     03-23    140  |  105  |  23  ----------------------------<  125<H>  3.9   |  26  |  0.79    Ca    9.2      23 Mar 2021 00:26    TPro  6.4  /  Alb  3.9  /  TBili  0.4  /  DBili  x   /  AST  31  /  ALT  36  /  AlkPhos  55  03-23    proBNP:   Lipid Profile:   HgA1c:   TSH:     TELE:  EKG:    < from: CT Abdomen and Pelvis w/ Oral Cont and w/ IV Cont (03.23.21 @ 18:42) >    PROCEDURE DATE:  03/23/2021      ******PRELIMINARY REPORT******    ******PRELIMINARY REPORT******              INTERPRETATION:  No acute pathology.    < end of copied text >      
pt hemodynamiclaly stable   cleared by Dr. Madison to be dced   vasc input noted   Cr stable   will dc home and fu with hepatology as o/p re: plavix given hx of varices   see dc summary 
DATE OF SERVICE: 03-25-21 @ 06:57    Subjective: Patient seen and examined. No new events except as noted.     SUBJECTIVE/ROS:    No chest pain, dyspnea, palpitation, or dizziness.   MEDICATIONS:  MEDICATIONS  (STANDING):  carvedilol 25 milliGRAM(s) Oral every 12 hours  heparin   Injectable 5000 Unit(s) SubCutaneous every 8 hours  lisinopril 2.5 milliGRAM(s) Oral daily  sodium chloride 0.9%. 1000 milliLiter(s) (100 mL/Hr) IV Continuous <Continuous>  tenofovir disoproxil fumarate (VIREAD) 300 milliGRAM(s) Oral daily      PHYSICAL EXAM:  T(C): 37.2 (03-25-21 @ 04:03), Max: 37.2 (03-24-21 @ 20:34)  HR: 66 (03-25-21 @ 04:03) (51 - 78)  BP: 107/74 (03-25-21 @ 04:03) (96/65 - 133/93)  RR: 18 (03-25-21 @ 04:03) (11 - 22)  SpO2: 96% (03-25-21 @ 04:03) (90% - 96%)  Wt(kg): --  I&O's Summary    24 Mar 2021 07:01  -  25 Mar 2021 06:57  --------------------------------------------------------  IN: 1120 mL / OUT: 1000 mL / NET: 120 mL            JVP: Normal  Neck: supple  Lung: clear   CV: S1 S2 , Murmur:  Abd: soft  Ext: No edema  neuro: Awake / alert  Psych: flat affect  Skin: normal``    LABS/DATA:    CARDIAC MARKERS:                                13.6   4.92  )-----------( 95       ( 24 Mar 2021 06:49 )             41.2     03-24    138  |  101  |  17  ----------------------------<  96  4.2   |  23  |  0.84    Ca    9.2      24 Mar 2021 06:49      proBNP:   Lipid Profile:   HgA1c:   TSH:     TELE:  EKG:        
Date of service: 21 @ 20:44      Patient is a 65y old  Male who presents with a chief complaint of                                                              INTERVAL HPI/OVERNIGHT EVENTS:    REVIEW OF SYSTEMS:     CONSTITUTIONAL: No weakness, fevers or chills  EYES/ENT: No visual changes , no ear ache   NECK: No pain or stiffness  RESPIRATORY: No cough, wheezing,  No shortness of breath  CARDIOVASCULAR: No chest pain or palpitations  GASTROINTESTINAL: No abdominal pain  . No nausea, vomiting, or hematemesis; No diarrhea or constipation. No melena or hematochezia.  GENITOURINARY: No dysuria, frequency or hematuria  NEUROLOGICAL: No numbness or weakness  SKIN: No itching, burning, rashes, or lesions                                                                                                                                                                                                                                                                                 Medications:  MEDICATIONS  (STANDING):  carvedilol 25 milliGRAM(s) Oral every 12 hours  heparin   Injectable 5000 Unit(s) SubCutaneous every 8 hours  lisinopril 2.5 milliGRAM(s) Oral daily  sodium chloride 0.9%. 1000 milliLiter(s) (100 mL/Hr) IV Continuous <Continuous>  tenofovir disoproxil fumarate (VIREAD) 300 milliGRAM(s) Oral daily    MEDICATIONS  (PRN):       Allergies    No Known Allergies    Intolerances      Vital Signs Last 24 Hrs  T(C): 37.2 (24 Mar 2021 20:34), Max: 37.2 (24 Mar 2021 20:34)  T(F): 98.9 (24 Mar 2021 20:34), Max: 98.9 (24 Mar 2021 20:34)  HR: 60 (24 Mar 2021 20:34) (51 - 78)  BP: 101/68 (24 Mar 2021 20:34) (96/65 - 133/93)  BP(mean): --  RR: 18 (24 Mar 2021 20:34) (11 - 22)  SpO2: 94% (24 Mar 2021 20:34) (90% - 99%)  CAPILLARY BLOOD GLUCOSE           @ 07:01  -   @ 20:44  --------------------------------------------------------  IN: 1120 mL / OUT: 0 mL / NET: 1120 mL      Physical Exam:    Daily     Daily Weight in k.4 (24 Mar 2021 04:22)  General:  Well appearing, NAD, not cachetic  HEENT:  Nonicteric, PERRLA  CV:  RRR, S1S2   Lungs:  CTA B/L, no wheezes, rales, rhonchi  Abdomen:  Soft, non-tender, no distended, positive BS  Extremities:  2+ pulses, no c/c, no edema  Skin:  Warm and dry, no rashes  :  No payton  Neuro:  AAOx3, non-focal, grossly intact                                                                                                                                                                                                                                                                                                LABS:                               13.6   4.92  )-----------( 95       ( 24 Mar 2021 06:49 )             41.2                      -    138  |  101  |  17  ----------------------------<  96  4.2   |  23  |  0.84    Ca    9.2      24 Mar 2021 06:49    TPro  6.4  /  Alb  3.9  /  TBili  0.4  /  DBili  x   /  AST  31  /  ALT  36  /  AlkPhos  55                         RADIOLOGY & ADDITIONAL TESTS         I personally reviewed: [  ]EKG   [  ]CXR    [  ] CT      A/P:         Discussed with :     Mary consultants' Notes   Time spent :  
Surgery Progress Note    S: Patient seen and examined. No acute events overnight. Patient reports abdominal pain is resolved. Tolerating PO.     O:  Vital Signs Last 24 Hrs  T(C): 37.2 (25 Mar 2021 04:03), Max: 37.2 (24 Mar 2021 20:34)  T(F): 98.9 (25 Mar 2021 04:03), Max: 98.9 (24 Mar 2021 20:34)  HR: 66 (25 Mar 2021 04:03) (51 - 78)  BP: 107/74 (25 Mar 2021 04:03) (96/65 - 133/93)  BP(mean): --  RR: 18 (25 Mar 2021 04:03) (11 - 22)  SpO2: 96% (25 Mar 2021 04:03) (90% - 96%)    I&O's Detail    24 Mar 2021 07:01  -  25 Mar 2021 07:00  --------------------------------------------------------  IN:    Oral Fluid: 720 mL    sodium chloride 0.9%: 400 mL  Total IN: 1120 mL    OUT:    Voided (mL): 1000 mL  Total OUT: 1000 mL    Total NET: 120 mL          MEDICATIONS  (STANDING):  carvedilol 25 milliGRAM(s) Oral every 12 hours  heparin   Injectable 5000 Unit(s) SubCutaneous every 8 hours  lisinopril 2.5 milliGRAM(s) Oral daily  sodium chloride 0.9%. 1000 milliLiter(s) (100 mL/Hr) IV Continuous <Continuous>  tenofovir disoproxil fumarate (VIREAD) 300 milliGRAM(s) Oral daily    MEDICATIONS  (PRN):                            13.6   4.92  )-----------( 95       ( 24 Mar 2021 06:49 )             41.2       03-24    138  |  101  |  17  ----------------------------<  96  4.2   |  23  |  0.84    Ca    9.2      24 Mar 2021 06:49        Physical Exam:  Gen: Laying in bed, NAD  Resp: Unlabored breathing  Abd: soft, NTND  Ext: WWP  Skin: No rashes

## 2021-03-25 NOTE — DISCHARGE NOTE NURSING/CASE MANAGEMENT/SOCIAL WORK - NSDCPNINST_GEN_ALL_CORE
any chest pain shortness of breath palpations go emergency room . right wrist any bleeding hardness swelling go emergency room

## 2021-03-25 NOTE — PROGRESS NOTE ADULT - ASSESSMENT
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  Risks, benefits and alternatives of the cardiac treatments and medical therapy including procedures were discussed in detail and all questions were answered. Importance of compliance with medical therapy and lifestyle modification to improve cardiovascular health were addressed. Appropriate forms and patient educational materials were reviewed. 30 minutes face to face spent.

## 2021-03-25 NOTE — DISCHARGE NOTE PROVIDER - CARE PROVIDERS DIRECT ADDRESSES
[email or web address]

## 2021-03-25 NOTE — DISCHARGE NOTE PROVIDER - NSDCCPCAREPLAN_GEN_ALL_CORE_FT
PRINCIPAL DISCHARGE DIAGNOSIS  Diagnosis: Chest pain, unspecified type  Assessment and Plan of Treatment: Troponins negative.  s/p cardiac cath with nonobstructive CAD.  Follow up with your PMD within one week of discharge to discuss your admission.      SECONDARY DISCHARGE DIAGNOSES  Diagnosis: Artery dissection  Assessment and Plan of Treatment: Chronic SMA dissection.  Seen by vascular recommending antiplatelet therapy (plavix).  Follow up with your hepatologist/pcp (Dr. Roger) for clearance and initiation of antiplatet therapy.  Follow up with vascular as outpatient.  Please call to schedule your appointment.    Diagnosis: Hypertension  Assessment and Plan of Treatment: Continue with your home BP meds.  Follow up with your PCP within one week of discharge.    Diagnosis: Left upper quadrant abdominal pain  Assessment and Plan of Treatment: Improved.

## 2021-03-25 NOTE — DISCHARGE NOTE PROVIDER - CARE PROVIDER_API CALL
Heriberto Duke)  Vascular Surgery  [address] [facility], [address]  [place] [address]  Phone: [phone]  Fax: [phone]  Follow Up Time:     Obey Hair)  Internal Medicine  [address] [place] [address]  Phone: [phone]  Fax: [phone]  Follow Up Time:

## 2021-03-25 NOTE — DISCHARGE NOTE PROVIDER - HOSPITAL COURSE
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  He is s/p cardiac cath on 3/25/2021 with non obstructive CAD.   Evaluated by Vascular for abd pain.  CT a/p from 3/23/21: Chronic appearing dissection flap in the superior mesenteric artery. Unchanged dilatation of the superior mesenteric artery, which measures 1.5 cm.  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  Patient noted with abnormal stress test. He is s/p cardiac cath on 3/25/2021 with non obstructive CAD.   Evaluated by Vascular for abd pain.  CT a/p from 3/23/21: Chronic appearing dissection flap in the superior mesenteric artery. Unchanged dilatation of the superior mesenteric artery, which measures 1.5 cm.  Vascular with recs to start antiplatelet therapy if deemed appropriate by cardiology ?plavix.  BW normal and patient without complaints.  Medically cleared for d/c radames with follow up to PCP, hepatologist and Vascular.

## 2021-03-25 NOTE — PROGRESS NOTE ADULT - ASSESSMENT
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     Plan:  - recommend antiplatelet therapy if deemed appropriate by cardiology, plavix appropriate  - will monitor for clinical improvement, please alert vascular team to change in clinical status  -no surgical intervention at this time   - care per primary team     Vascular Surgery  [phone]

## 2021-03-30 PROBLEM — K76.9 LIVER DISEASE, UNSPECIFIED: Chronic | Status: ACTIVE | Noted: 2021-03-23

## 2021-03-30 PROBLEM — I10 ESSENTIAL (PRIMARY) HYPERTENSION: Chronic | Status: ACTIVE | Noted: 2021-03-23

## 2021-03-31 ENCOUNTER — APPOINTMENT (OUTPATIENT)
Dept: HEPATOLOGY | Facility: CLINIC | Age: 66
End: 2021-03-31
Payer: MEDICARE

## 2021-03-31 VITALS
WEIGHT: 172 LBS | HEIGHT: 67 IN | TEMPERATURE: 97.9 F | DIASTOLIC BLOOD PRESSURE: 76 MMHG | BODY MASS INDEX: 27 KG/M2 | RESPIRATION RATE: 14 BRPM | HEART RATE: 59 BPM | SYSTOLIC BLOOD PRESSURE: 112 MMHG

## 2021-03-31 DIAGNOSIS — K76.89 OTHER SPECIFIED DISEASES OF LIVER: ICD-10-CM

## 2021-03-31 PROCEDURE — 99072 ADDL SUPL MATRL&STAF TM PHE: CPT

## 2021-03-31 PROCEDURE — 99213 OFFICE O/P EST LOW 20 MIN: CPT

## 2021-04-01 ENCOUNTER — NON-APPOINTMENT (OUTPATIENT)
Age: 66
End: 2021-04-01

## 2021-04-01 NOTE — REVIEW OF SYSTEMS
[Easy Bruising] : a tendency for easy bruising [Negative] : Endocrine [Fever] : no fever [Chills] : no chills [Feeling Tired] : not feeling tired [Chest Pain] : no chest pain [Palpitations] : no palpitations [Shortness Of Breath] : no shortness of breath [Wheezing] : no wheezing [Cough] : no cough [Abdominal Pain] : no abdominal pain [Vomiting] : no vomiting [Constipation] : no constipation [Diarrhea] : no diarrhea [Dysuria] : no dysuria [Itching] : no itching [Confused] : no confusion [Dizziness] : no dizziness [Fainting] : no fainting [de-identified] : Left forearm bruising from IV placement

## 2021-04-01 NOTE — ASSESSMENT
[FreeTextEntry1] : Mr. 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 He is being seen s/p hospitalization for chest pain requiring cardiac cath revealing non-obstructive CAD. Upon d/c from hospital pt was encouraged to f/u with Vascular to start Plavix and instructed to f/u with Hepatology for clearance.\par \par -03/2021: BW via HIE WBC 4.92, H+H 13.6/41.2, PLT 95, cr 0.87, ALP 55, AST 31, ALT 36. \par \par 1.HBV/Cirrhosis/Liver lesion\par -HE/ascites/LE edema--none noted\par -HCC: next MRI screening due June 2021 for f/u on 6mm liver lesion \par -EV: medium-sized esophageal varices that never bled (05/2019), remains on carvedilol 25 mg bid \par -Chronic HBV: remains on Viread since diagnosis, negative viral load\par - I have explained and educated pt at length the natural history of cirrhosis, complications of cirrhosis with portal hypertension, esophageal varices with bleeding, portosystemic encephalopathy, ascites and infection, and  risk of HCC. \par \par 2. LASSITER \par -I have explained the best treatment for fatty liver is diet and exercise. I have encouraged a gradual weight loss of 10%. \par -I have encouraged a healthy lifestyle including exercising at least 3x times weekly and maintaining a diet low in carbohydrates, fat and cholesterol. \par -I have counseled pt on abstaining from alcohol and illicit drug use, avoid herbal and dietary supplements. Encouraged limit use of acetaminophen to <2gm per day and to avoid NSAIDS.    \par \par 3.HM \par -Colonoscopy: Due 2021, will discuss at next appt with [facility]\par -EGD: Ok to start plavix and repeat EGD after completion and remain on carvedilol 25mg BID\par \par Plan:\par RTC 1mn with MD [facility]\par F/u HCC screening in June 2021\par Continue carvedilol 25 mg bid, Ok to start Plavix for SMA dissection, with repeat EGD once completed. Reviewed s+s of bleeding and when to report to the ED in the event that he is experiencing active bleeding.  \par Continued weight loss and exercise. Maintain Vit E 800 IU/mL \par

## 2021-04-01 NOTE — HISTORY OF PRESENT ILLNESS
[Fatigue] : denies fatigue [Malaise] : denies malaise [Fever] : denies fever [Diffuse Joint Pain (Arthralgias)] : denies arthralgias [Muscle Aches, Generalized (Myalgias)] : denies myalgias [Abdominal Pain] : denies abdominal pain [Yellow Skin Or Eyes (Jaundice)] : denies jaundice [Urine Tests Nonspecific Abnormal Findings Biliuria] : denies dark urine [Light Colored Bowel Movement (Acholic Stools)] : denies pale stools [Insomnia] : denies insomnia [Skin: Rash] : denies rash [Itching (Pruritus)] : denies pruritus [Shortness Of Breath] : denies shortness of breath [Hospitalization] : ~he/she~ was hospitalized [Needlestick Exposure] : no needlestick exposure [Infected Sexual Partner] : no infected sexual partner [IV Drug Use] : no IV drug use [Tattoo] : no tattoos [Body Piercing] : no body piercing [Hemodialysis] : no hemodialysis [Transfusion before 1992] : no transfusion before 1992 [Transplant before 1992] : no transplant before 1992 [Incarceration] : no incarceration [Alcohol Abuse] : no alcohol abuse [Autoimmune Disorder] : no autoimmune disorder [Household Contact to HBV] : no household contact to HBV [Travel to Endemic Area] : no travel to an endemic area [Occupational Exposure] : no occupational exposure [Cocaine Use] : no cocaine use [Wt Gain ___ Lbs] : no recent weight gain [Wt Loss ___ Lbs] : no recent weight loss [de-identified] : at [facility] for chest pain [de-identified] : Mr. 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 He is being seen s/p hospitalization for chest pain requiring cardiac cath revealing non-obstructive CAD. Upon d/c from hospital pt was encouraged to f/u with Vascular to start Plavix and instructed to f/u with Hepatology for clearance. Presents today with no complaints. Denies fever/chills, SOB, ab pain, melena/BRBPR, chest pain/palpitations, and hematemesis/hemoptysis. \par \par Workup:\par -03/2021: BW via HIE WBC 4.92, H+H 13.6/41.2, PLT 95, cr 0.87, ALP 55, AST 31, ALT 36. \par -01/2021: BW revealed HBV PCR not detected,AST 28, ALT 33, ALP 65, bili 0.8, , INR 1.09. MRI revealed mild splenomegaly, right liver lobe lesion unchanged, not fully characterized, absence of detectable enhancement decreases likelihood of tumor, recommend f/u MRI 6mns\par \par -10/2020 MRI (scanned): 6 mm liver lesion inferior R lobe seg 6/7, T2 hyperintense, no definitive arterial enhancement; motion artefact. No overt cirrhotic features. Recommend MRI after 4-6 mo. Moderate fatty replacement-marbling throughout pancreas.\par -09/2020 US abdomen: cirrhosis, 1 cm liver lesion indeterminate. BW revealed AST 24, ALT 29, ALP 51, bili 0.4, AFP 2.0, \par -03/2020 US abdomen: mild hepatic steatosis. No lesion. GB normal.\par \par -09/2019 MRI ([facility]): mildly cirrhotic liver, sub-cm lesions likely cysts. Spleen normal. Stable SMA dissection. Normal GB. 1.1 cm ivanna hepatitis T2 hyperintense and enhancing lymph node. \par -05/2019 EGD: medium-sized varices, not banded. LA-grade C reflux esophagitis. Two gastric inlet patches in the upper esophagus. Mild erosive gastritis, biopsied. Started Omeprazole 40 mg/d. Given bradycardia, switched nadolol 20 mg/d to carvedilol - start 6.25 mg bid, increase to 12.5 mg bid after 3 days if tolerated. Path: nonspecific gastritis, H. pylori negative\par -02/2019 CT abdomen wwo (scanned): cirrhosis, small probable hemangioma seg IVb. SMA dissection several cm. Atherosclerotic changes. No comparison possible.\par \par -11/2016 MRI abdomen: Cirrhosis. 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 The remaining vessels are patent. No significant varices. IMPRESSION: Cirrhosis. No evidence of hepatoma\par -2011: Colonoscopy: normal \par \par ROS as below\par

## 2021-04-01 NOTE — PHYSICAL EXAM
[General Appearance - Alert] : alert [General Appearance - Well-Appearing] : healthy appearing [Sclera] : the sclera and conjunctiva were normal [Outer Ear] : the ears and nose were normal in appearance [Neck Appearance] : the appearance of the neck was normal [Jugular Venous Distention Increased] : there was no jugular-venous distention [] : no respiratory distress [Respiration, Rhythm And Depth] : normal respiratory rhythm and effort [Heart Sounds] : normal S1 and S2 [Edema] : there was no peripheral edema [Bowel Sounds] : normal bowel sounds [Abdomen Soft] : soft [No CVA Tenderness] : no ~M costovertebral angle tenderness [Nail Clubbing] : no clubbing  or cyanosis of the fingernails [Abnormal Walk] : normal gait [Sensation] : the sensory exam was normal to light touch and pinprick [Oriented To Time, Place, And Person] : oriented to person, place, and time [Affect] : the affect was normal [Scleral Icterus] : No Scleral Icterus [Spider Angioma] : No spider angioma(s) were observed [Abdominal  Ascites] : no ascites [Asterixis] : no asterixis observed [Jaundice] : No jaundice [FreeTextEntry1] : left forearm bruising

## 2021-04-27 ENCOUNTER — APPOINTMENT (OUTPATIENT)
Dept: VASCULAR SURGERY | Facility: CLINIC | Age: 66
End: 2021-04-27
Payer: MEDICARE

## 2021-04-27 VITALS
SYSTOLIC BLOOD PRESSURE: 129 MMHG | HEIGHT: 69 IN | TEMPERATURE: 98.6 F | DIASTOLIC BLOOD PRESSURE: 89 MMHG | HEART RATE: 56 BPM | BODY MASS INDEX: 25.62 KG/M2 | WEIGHT: 173 LBS

## 2021-04-27 DIAGNOSIS — I77.79 DISSECTION OF OTHER SPECIFIED ARTERY: ICD-10-CM

## 2021-04-27 PROCEDURE — 93976 VASCULAR STUDY: CPT

## 2021-04-27 PROCEDURE — 99213 OFFICE O/P EST LOW 20 MIN: CPT

## 2021-04-27 PROCEDURE — 99072 ADDL SUPL MATRL&STAF TM PHE: CPT

## 2021-04-27 NOTE — ASSESSMENT
[FreeTextEntry1] : Impression asymptomatic SMA dissection w ectasia\par \par \par Plan Med Conservative management\par f/u w internist for BP managemnt \par continue plavix 75 daily \par pt was also referred to Dr ROSALIA Byrd  for BP management\par ov w Abd US s/o SMA dissection and ectasia  3 mo prior to travel to [place]\par  [Arterial/Venous Disease] : arterial/venous disease [Medication Management] : medication management

## 2021-04-27 NOTE — PHYSICAL EXAM
[JVD] : no jugular venous distention  [Normal Breath Sounds] : Normal breath sounds [Ankle Swelling (On Exam)] : not present [Varicose Veins Of Lower Extremities] : not present [] : not present [Tender] : was nontender [Alert] : alert [Oriented to Person] : oriented to person [Oriented to Place] : oriented to place [Oriented to Time] : oriented to time [Calm] : calm [de-identified] : nad [de-identified] : wml [FreeTextEntry1] : abd soft nt nd  [de-identified] : wml [de-identified] : wml [de-identified] : Aron Cranial nerves 2-12 aron grossly intact [de-identified] : cooperative

## 2021-04-27 NOTE — REVIEW OF SYSTEMS
SEVERITY:- BORDERLINE ECG -

SINUS RHYTHM

LOW VOLTAGE THROUGHOUT

BORDERLINE T ABNORMALITIES, DIFFUSE LEADS

:

Confirmed by: Ernst Mc 17-Feb-2018 22:16:43
[Negative] : Heme/Lymph

## 2021-04-27 NOTE — HISTORY OF PRESENT ILLNESS
[FreeTextEntry1] : pt was eval at [facility]  where he underwent a cardiac w/u\par  cta of abd/pelvis sif for SMA dissection\par pt was placed on plavix 75 daily\par pt states no abd c/o brittany po well\par

## 2021-05-03 ENCOUNTER — APPOINTMENT (OUTPATIENT)
Dept: HEPATOLOGY | Facility: CLINIC | Age: 66
End: 2021-05-03
Payer: MEDICARE

## 2021-05-03 ENCOUNTER — APPOINTMENT (OUTPATIENT)
Dept: INTERNAL MEDICINE | Facility: CLINIC | Age: 66
End: 2021-05-03

## 2021-05-03 VITALS
RESPIRATION RATE: 12 BRPM | TEMPERATURE: 98 F | DIASTOLIC BLOOD PRESSURE: 74 MMHG | HEART RATE: 63 BPM | SYSTOLIC BLOOD PRESSURE: 117 MMHG | BODY MASS INDEX: 26.07 KG/M2 | OXYGEN SATURATION: 100 % | WEIGHT: 176 LBS | HEIGHT: 69 IN

## 2021-05-03 PROCEDURE — 99214 OFFICE O/P EST MOD 30 MIN: CPT

## 2021-05-03 PROCEDURE — 99072 ADDL SUPL MATRL&STAF TM PHE: CPT

## 2021-05-03 RX ADMIN — POLYETHYLENE GLYCOL 3350 AND ELECTROLYTES WITH LEMON FLAVOR 0 GM: 236; 22.74; 6.74; 5.86; 2.97 POWDER, FOR SOLUTION ORAL at 00:00

## 2021-05-03 NOTE — HISTORY OF PRESENT ILLNESS
[Fatigue] : denies fatigue [Malaise] : denies malaise [Fever] : denies fever [Diffuse Joint Pain (Arthralgias)] : denies arthralgias [Muscle Aches, Generalized (Myalgias)] : denies myalgias [Yellow Skin Or Eyes (Jaundice)] : denies jaundice [Abdominal Pain] : denies abdominal pain [Urine Tests Nonspecific Abnormal Findings Biliuria] : denies dark urine [Light Colored Bowel Movement (Acholic Stools)] : denies pale stools [Insomnia] : denies insomnia [Skin: Rash] : denies rash [Itching (Pruritus)] : denies pruritus [Shortness Of Breath] : denies shortness of breath [Needlestick Exposure] : no needlestick exposure [Infected Sexual Partner] : no infected sexual partner [IV Drug Use] : no IV drug use [Tattoo] : no tattoos [Body Piercing] : no body piercing [Hemodialysis] : no hemodialysis [Transfusion before 1992] : no transfusion before 1992 [Transplant before 1992] : no transplant before 1992 [Incarceration] : no incarceration [Alcohol Abuse] : no alcohol abuse [Autoimmune Disorder] : no autoimmune disorder [Household Contact to HBV] : no household contact to HBV [Travel to Endemic Area] : no travel to an endemic area [Occupational Exposure] : no occupational exposure [Wt Gain ___ Lbs] : no recent weight gain [Cocaine Use] : no cocaine use [Wt Loss ___ Lbs] : no recent weight loss [de-identified] : - 5/3/31: returns after hospitalization at [facility] 3/25 b/o SMA dissection, saw MARIA INES Messina after discharge, now on clopidogrel. Back pain has resolved.\par \par - 2/4/21: returns after bloodwork. MRI done at [facility] report not available. Had hypotension in December, , but no hematochezia or melena. Feels good, is active. Has not lost weight, BMI 28.2\par \par - 10/19/20: returns after US showed 1 cm liver lesion and MRI showed indeterminate lesion. Doing well. GERD controlled with omeprazole. \par \par - 7/20/20: returns after labs in January. Tolerated carvedilol 25 mg bid, is very active gardening, makes his own wine. Takes tenofovir.\par - 11/4/20: return visit\par - 6/3/19: here after EGD which showed large esophageal varices. Tolerating carvedilol 12.5 mg bid - I switched from nadolol b/o bradycardia on sub-optimal dose. Today HR in the 50s as always. Feels very energetic - built the handrails of stairs and porch around his house in 3 days, but has longstanding sleep problems at night. \par \par - 7/15/19: doing well, tolerating carvedilol 25 mg bid, but HR 49 and BP sometimes 90s at home - got dizzy at home after getting up quickly; also taking omeprazole for GERD seen on EGD 5/2019.  CT 2/2019 report reviewed with patient.\par \par - initial visit  2/5/19: Mr. KNOX is a 63 year old man with chronic hepatitis B (dx. 2004), on Viread since diagnosis, compensated cirrhosis, large varices that never bled, on carvedilol, GERD, overweight BMI 27, 176 lbs. \par \par weight: never signif. heavier than now - overweight. Alcohol: social use. 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 No overt cirrhotic features. Recommend MRI after 4-6 mo. Moderate fatty replacement-marbling throughout pancreas.\par - 9/25/20 US abdomen: cirrhosis, 1 cm liver lesion indeterminate.\par - 3/17/20 US abdomen: mild hepatic steatosis. No lesion. GB normal.\par - 9/18/19 MRI ([facility]): mildly cirrhotic liver, sub-cm lesions likely cysts. Spleen normal. Stable SMA dissection. Normal GB. 1.1 cm ivanna hepatitis T2 hyperintense and enhancing lymph node. \par - 2/25/19 CT abdomen wwo (scanned): cirrhosis, small probable hemangioma seg IVb. SMA dissection several cm. Atherosclerotic changes. No comparison possible.\par - 5/22/19 EGD: medium-sized varices, not banded. LA-grade C reflux esophagitis. Two gastric inlet patches in the upper esophagus. Mild erosive gastritis, biopsied. Started Omeprazole 40 mg/d. Given bradycardia, switched nadolol 20 mg/d to carvedilol - start 6.25 mg bid, increase to 12.5 mg bid after 3 days if tolerated. Path: nonspecific gastritis, H. pylori negative.\par - 11/2016 MRI abdomen: Cirrhosis. 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 The remaining vessels are patent. No significant varices. IMPRESSION: Cirrhosis. No evidence of hepatoma\par - Colonoscopy: normal in 2011

## 2021-05-03 NOTE — PHYSICAL EXAM
[Full Pulse] : the pedal pulses are present [Edema] : there was no peripheral edema [FreeTextEntry1] : no fluid wave/shifting dullness

## 2021-05-03 NOTE — ASSESSMENT
[FreeTextEntry1] : Mr. KNOX is a 65 year old man, orig. from [place], with\par \par - compensated cirrhosis, normal MELD labs, PLT < 150. 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 and chronic hepatitis B\par   - medium-sized esophageal varices (EGD 5/22/19) that never bled, on carvedilol 25 mg bid - tolerating now after initial dizziness when getting up\par - chronic hepatitis B diagnosed in 2005, unclear source of infection; on Viread since diagnosis, negative viral load\par - LASSITER - mild NAFLD suggested by US; intermittently mildly elevated AST < 50 U/L\par - liver lesion: 6 mm indeterminate on outside MRI; was 10 mm on US, not seen on CTA 3/2021\par - sub-cm hepatic cysts\par - glucose intolerance\par - overweight, BMI 26\par \par - GERD, LA-grade C reflux esophagitis (EGD 5/2019): takes TUMS every night. PPI helped in past.\par - colon cancer screening: will schedule\par \par Plan:\par - liver lesion: repeat US after 3 months, AFP level today with MELD labs\par - varices continue carvedilol to 25 mg bid; no repeat EGD now since on plavix\par - GERD: continue omeprazole.\par - NAFLD/LASSITER: weight loss and exercise. Continue Vit E 800 IU/mL \par - colonoscopy, continue Plavix\par - return in 3 months after repeat labs. \par

## 2021-05-04 LAB
AFP-TM SERPL-MCNC: 2.3 NG/ML
ALBUMIN SERPL ELPH-MCNC: 4.5 G/DL
ALP BLD-CCNC: 53 U/L
ALT SERPL-CCNC: 19 U/L
ANION GAP SERPL CALC-SCNC: 14 MMOL/L
AST SERPL-CCNC: 16 U/L
BASOPHILS # BLD AUTO: 0.01 K/UL
BASOPHILS NFR BLD AUTO: 0.2 %
BILIRUB SERPL-MCNC: 0.4 MG/DL
BUN SERPL-MCNC: 21 MG/DL
CALCIUM SERPL-MCNC: 9.5 MG/DL
CHLORIDE SERPL-SCNC: 106 MMOL/L
CHOLEST SERPL-MCNC: 183 MG/DL
CO2 SERPL-SCNC: 22 MMOL/L
CREAT SERPL-MCNC: 1.11 MG/DL
EOSINOPHIL # BLD AUTO: 0.18 K/UL
EOSINOPHIL NFR BLD AUTO: 3.7 %
GLUCOSE SERPL-MCNC: 87 MG/DL
HCT VFR BLD CALC: 42.3 %
HDLC SERPL-MCNC: 40 MG/DL
HGB BLD-MCNC: 13.8 G/DL
IMM GRANULOCYTES NFR BLD AUTO: 0.2 %
INR PPP: 1.08 RATIO
LDLC SERPL CALC-MCNC: 110 MG/DL
LYMPHOCYTES # BLD AUTO: 1.71 K/UL
LYMPHOCYTES NFR BLD AUTO: 35.6 %
MAN DIFF?: NORMAL
MCHC RBC-ENTMCNC: 28.9 PG
MCHC RBC-ENTMCNC: 32.6 GM/DL
MCV RBC AUTO: 88.7 FL
MONOCYTES # BLD AUTO: 0.38 K/UL
MONOCYTES NFR BLD AUTO: 7.9 %
NEUTROPHILS # BLD AUTO: 2.52 K/UL
NEUTROPHILS NFR BLD AUTO: 52.4 %
NONHDLC SERPL-MCNC: 143 MG/DL
PLATELET # BLD AUTO: 119 K/UL
POTASSIUM SERPL-SCNC: 4.6 MMOL/L
PROT SERPL-MCNC: 6.9 G/DL
PT BLD: 12.7 SEC
RBC # BLD: 4.77 M/UL
RBC # FLD: 13.7 %
SODIUM SERPL-SCNC: 142 MMOL/L
TRIGL SERPL-MCNC: 165 MG/DL
WBC # FLD AUTO: 4.81 K/UL

## 2021-05-05 LAB
HBV DNA # SERPL NAA+PROBE: NOT DETECTED IU/ML
HEPB DNA PCR LOG: NOT DETECTED LOG10IU/ML

## 2021-05-27 ENCOUNTER — NON-APPOINTMENT (OUTPATIENT)
Age: 66
End: 2021-05-27

## 2021-06-11 ENCOUNTER — NON-APPOINTMENT (OUTPATIENT)
Age: 66
End: 2021-06-11

## 2021-06-14 ENCOUNTER — NON-APPOINTMENT (OUTPATIENT)
Age: 66
End: 2021-06-14

## 2021-06-14 ENCOUNTER — LABORATORY RESULT (OUTPATIENT)
Age: 66
End: 2021-06-14

## 2021-06-14 ENCOUNTER — APPOINTMENT (OUTPATIENT)
Dept: INTERNAL MEDICINE | Facility: CLINIC | Age: 66
End: 2021-06-14
Payer: MEDICARE

## 2021-06-14 VITALS
OXYGEN SATURATION: 98 % | BODY MASS INDEX: 28.28 KG/M2 | DIASTOLIC BLOOD PRESSURE: 74 MMHG | SYSTOLIC BLOOD PRESSURE: 110 MMHG | HEIGHT: 66.25 IN | HEART RATE: 46 BPM | WEIGHT: 176 LBS

## 2021-06-14 DIAGNOSIS — Z13.39 ENCOUNTER FOR SCREENING EXAM FOR OTHER MENTAL HEALTH AND BEHAVIORAL DISORDERS: ICD-10-CM

## 2021-06-14 DIAGNOSIS — R68.89 OTHER GENERAL SYMPTOMS AND SIGNS: ICD-10-CM

## 2021-06-14 PROCEDURE — G0442 ANNUAL ALCOHOL SCREEN 15 MIN: CPT

## 2021-06-14 PROCEDURE — 93000 ELECTROCARDIOGRAM COMPLETE: CPT | Mod: 59

## 2021-06-14 PROCEDURE — G0439: CPT

## 2021-06-14 PROCEDURE — G0444 DEPRESSION SCREEN ANNUAL: CPT | Mod: 59

## 2021-06-20 ENCOUNTER — APPOINTMENT (OUTPATIENT)
Dept: DISASTER EMERGENCY | Facility: CLINIC | Age: 66
End: 2021-06-20

## 2021-06-30 LAB
B BURGDOR IGG+IGM SER QL IB: NORMAL
FOLATE SERPL-MCNC: 10.9 NG/ML
MAGNESIUM SERPL-MCNC: 2 MG/DL
T PALLIDUM AB SER QL IA: NEGATIVE
TSH SERPL-ACNC: 1.75 UIU/ML
VIT B12 SERPL-MCNC: 248 PG/ML

## 2021-07-06 NOTE — PROVIDER CONTACT NOTE (OTHER) - ASSESSMENT
Refill request received for Wellbutrin  mg tab; 1 tab twice daily.  Patient is due for 3 month follow up that was due in June 2021.   Sig updated to schedule office visit.  Refill completed.  
Pt is A&OX4, denies chest pain, SOB, palpitations, dizziness, lightheadedness, nausea/vomiting. Pt states he was asleep at time of episode. HR 55-60 on tele monitor, /67, Temp 98.1, O2 96% on room air.

## 2021-07-11 PROBLEM — R68.89 FORGETFULNESS: Status: ACTIVE | Noted: 2021-06-14

## 2021-07-11 PROBLEM — Z13.39 SCREENING FOR ALCOHOL PROBLEM: Status: RESOLVED | Noted: 2021-07-11 | Resolved: 2021-07-13

## 2021-07-11 NOTE — ASSESSMENT
[FreeTextEntry1] : 1.  HCM - preventive topics d/w pt\par -check screening labs\par -Hep C negative 2009\par - Pros/cons of PSA screening d/w pt - PSA in 1/20 was normal.  \par -Colonoscopy was in 2011 - 10 year repeat was advised and is due\par -Pneumovax is UTD.  Shingrix discussed,  Had COVID series\par 2.  Hep B cirrhosis - Followed by Hepatology.  Labs reviewed and are UTD.\par 3.  HTN - On lisinopril without problem.   \par 4.  Increasing forgetfulness with anxiety - will check lyme titer, syphilis, B12 and folate.\par 5.  Recent SMA dissection - to see vascular and cardiology.\par \par RTC 6 months or sooner prn advised

## 2021-07-11 NOTE — HISTORY OF PRESENT ILLNESS
[Health Maintenance] : health maintenance [___ Year(s) Ago] : [unfilled] year(s) ago [Good] : good [Healthy Diet] : He consumes a diverse and healthy diet [Regular Exercise] : He exercises regularly [Cigarette ___ppd] : [unfilled] cigarette pack(s) per day [No Previous PSA Testing] : no previous prostate-specific antigen testing [Colonoscopy Within 10 Years] : a colonoscopy within the past ten years [Reviewed and Updated] : metabolic screening reviewed and updated [Seat Belt] : seat belt [Safe Driving Habits] : safe driving habits [Sunscreen] : sunscreen [Smoke Detector] : smoke detector [Carbon Monoxide Detector] : carbon monoxide detector [de-identified] : 64 yo male with a h/o chronic Hep B with cirrhosis and esophageal varices followed by Hepatology here for his annual wellness visit. He had far left chest/upper abdominal pain.  Went to the hospital and was found to have an SMA dissection.  Was advised to see cardiology but has yet to make the appointment.\par \par He states no complaints.  \par \par Getting forgetful - left wallet in his car, increased anxiety when he looses something according to his wife.\par Pt has low back pain, worse on the left. he works as a super. It is better when he walks.\par \laurita Had his COVID vaccine - Feb and March - Moderna.\par \par  [Reg. Dental Visits] : He does not have regular dental visits [Vision Problems] : He denies vision problems [Hearing Loss] : He denies hearing loss [Weight Concerns] : He does not have any weight concerns [Tobacco Use] : He does not use tobacco [Alcohol Use] : He denies alcohol use [Drug Abuse] : He denies drug use [Up to Date] : not up to date [de-identified] : 0.5PPD x 20 years [FreeTextEntry9] :  [de-identified] : Pros/cons of PSA screening d/w pt including risk for false positive results requiring possibly unnecessary additional evaluation and risk for false negative results possibly missing a cancer and pt would like PSA screening done. [de-identified] : Last Colonoscopy 2011 and 10 year repeat was advised [FreeTextEntry1] : \par

## 2021-07-11 NOTE — COUNSELING
[Health Goal(s) Reviewed with Patient] : Health Goal(s) Reviewed with Patient [Fall prevention counseling provided] : Fall prevention counseling provided [[id number]] : Maintain a healthy lifestyle\par  [[id number]] : none noted

## 2021-07-11 NOTE — PHYSICAL EXAM
[General Appearance - Alert] : alert [General Appearance - In No Acute Distress] : in no acute distress [General Appearance - Well Nourished] : well nourished [General Appearance - Well Developed] : well developed [General Appearance - Well-Appearing] : healthy appearing [Sclera] : the sclera and conjunctiva were normal [PERRL With Normal Accommodation] : pupils were equal in size, round, and reactive to light [Extraocular Movements] : extraocular movements were intact [Examination Of The Oral Cavity] : the lips and gums were normal [Oropharynx] : the oropharynx was normal [Neck Appearance] : the appearance of the neck was normal [Respiration, Rhythm And Depth] : normal respiratory rhythm and effort [Exaggerated Use Of Accessory Muscles For Inspiration] : no accessory muscle use [Auscultation Breath Sounds / Voice Sounds] : lungs were clear to auscultation bilaterally [Heart Rate And Rhythm] : heart rate was normal and rhythm regular [Heart Sounds] : normal S1 and S2 [Edema] : there was no peripheral edema [Breast Appearance] : normal in appearance [Breast Palpation Mass] : no palpable masses [Breast Abnormal Lactation (Galactorrhea)] : no nipple discharge [Bowel Sounds] : normal bowel sounds [Abdomen Soft] : soft [Abdomen Tenderness] : non-tender [] : no hepato-splenomegaly [Penis Abnormality] : the penis was normal [Scrotum] : the scrotum was normal [Testes Swelling] : the testicles were not swollen [Testes Mass (___cm)] : there were no testicular masses [Cervical Lymph Nodes Enlarged Posterior Bilaterally] : posterior cervical [Cervical Lymph Nodes Enlarged Anterior Bilaterally] : anterior cervical [Supraclavicular Lymph Nodes Enlarged Bilaterally] : supraclavicular [Axillary Lymph Nodes Enlarged Bilaterally] : axillary [Inguinal Lymph Nodes Enlarged Bilaterally] : inguinal [No Spinal Tenderness] : no spinal tenderness [Skin Turgor] : normal skin turgor [Cranial Nerves] : cranial nerves 2-12 were intact [No Focal Deficits] : no focal deficits [Oriented To Time, Place, And Person] : oriented to person, place, and time [Impaired Insight] : insight and judgment were intact [Affect] : the affect was normal [Mood] : the mood was normal [FreeTextEntry1] : on thighs b/l were 2 lesions that were scaly

## 2021-07-11 NOTE — HEALTH RISK ASSESSMENT
[Very Good] : ~his/her~  mood as very good [6-10] : 6-10 [No] : In the past 12 months have you used drugs other than those required for medical reasons? No [No falls in past year] : Patient reported no falls in the past year [0] : 2) Feeling down, depressed, or hopeless: Not at all (0) [Financial] : financial [With Family] : lives with family [Employed] : employed [] :  [Feels Safe at Home] : Feels safe at home [Fully functional (bathing, dressing, toileting, transferring, walking, feeding)] : Fully functional (bathing, dressing, toileting, transferring, walking, feeding) [Fully functional (using the telephone, shopping, preparing meals, housekeeping, doing laundry, using] : Fully functional and needs no help or supervision to perform IADLs (using the telephone, shopping, preparing meals, housekeeping, doing laundry, using transportation, managing medications and managing finances) [Reports changes in hearing] : Reports changes in hearing [Smoke Detector] : smoke detector [Carbon Monoxide Detector] : carbon monoxide detector [Seat Belt] :  uses seat belt [Sunscreen] : uses sunscreen [With Patient/Caregiver] : With Patient/Caregiver [] : No [[id number]] : 0 [Change in mental status noted] : No change in mental status noted [Language] : denies difficulty with language [Behavior] : denies difficulty with behavior [Learning/Retaining New Information] : denies difficulty learning/retaining new information [Handling Complex Tasks] : denies difficulty handling complex tasks [Reasoning] : denies difficulty with reasoning [Spatial Ability and Orientation] : denies difficulty with spatial ability and orientation [Guns at Home] : no guns at home [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

## 2021-07-27 ENCOUNTER — APPOINTMENT (OUTPATIENT)
Dept: VASCULAR SURGERY | Facility: CLINIC | Age: 66
End: 2021-07-27
Payer: MEDICARE

## 2021-07-27 VITALS
DIASTOLIC BLOOD PRESSURE: 90 MMHG | TEMPERATURE: 95.4 F | HEIGHT: 66 IN | WEIGHT: 176 LBS | SYSTOLIC BLOOD PRESSURE: 139 MMHG | HEART RATE: 50 BPM | BODY MASS INDEX: 28.28 KG/M2

## 2021-07-27 DIAGNOSIS — I77.79 DISSECTION OF OTHER SPECIFIED ARTERY: ICD-10-CM

## 2021-07-27 PROCEDURE — 93976 VASCULAR STUDY: CPT

## 2021-07-27 PROCEDURE — 99214 OFFICE O/P EST MOD 30 MIN: CPT

## 2021-07-27 NOTE — HISTORY OF PRESENT ILLNESS
[FreeTextEntry1] : pt was eval at [facility]  where he underwent a cardiac w/u\par  cta of abd/pelvis sif for SMA dissection\par pt was placed on plavix 75 daily\par pt states no abd c/o brittany po well\par  [de-identified] : pt has f/u w Dr Byrd for bp management \par pt is on plavix 75 daily\par pt states no abd c/o at this time

## 2021-07-27 NOTE — PHYSICAL EXAM
[Normal Breath Sounds] : Normal breath sounds [Alert] : alert [Oriented to Person] : oriented to person [Oriented to Place] : oriented to place [Oriented to Time] : oriented to time [Calm] : calm [JVD] : no jugular venous distention  [Ankle Swelling (On Exam)] : not present [Varicose Veins Of Lower Extremities] : not present [] : not present [Tender] : was nontender [de-identified] : nad [de-identified] : wml [FreeTextEntry1] : abd soft nt nd  [de-identified] : wml [de-identified] : wml [de-identified] : Aron Cranial nerves 2-12 aron grossly intact [de-identified] : cooperative

## 2021-07-27 NOTE — ASSESSMENT
[Arterial/Venous Disease] : arterial/venous disease [Medication Management] : medication management [FreeTextEntry1] : Impression asymptomatic SMA dissection w ectasia clinically stable \par \par \par Plan Med Conservative management\par f/uw Dr Byrd for bp managemnt prn \par continue plavix 75 daily \par Abd US s/o SMA dissection and ectasia 12mmo aug 2022 then telehealth\par pt may travel to [place] \par

## 2021-07-27 NOTE — DATA REVIEWED
[FreeTextEntry1] : 4/27/2021  Abd US sig for SMA dissection  remain w proximal segment 1.5 cm \par \par 7/27/2021 Abd US sig for SMA mid level chronic  dissection  remains w proximal segment 1.5 cm dilatation

## 2021-07-28 ENCOUNTER — TRANSCRIPTION ENCOUNTER (OUTPATIENT)
Age: 66
End: 2021-07-28

## 2021-08-27 ENCOUNTER — RX RENEWAL (OUTPATIENT)
Age: 66
End: 2021-08-27

## 2021-09-13 ENCOUNTER — APPOINTMENT (OUTPATIENT)
Dept: HEPATOLOGY | Facility: CLINIC | Age: 66
End: 2021-09-13

## 2021-09-15 ENCOUNTER — OUTPATIENT (OUTPATIENT)
Dept: OUTPATIENT SERVICES | Facility: HOSPITAL | Age: 66
LOS: 1 days | End: 2021-09-15
Payer: COMMERCIAL

## 2021-09-15 ENCOUNTER — APPOINTMENT (OUTPATIENT)
Dept: ULTRASOUND IMAGING | Facility: CLINIC | Age: 66
End: 2021-09-15
Payer: MEDICARE

## 2021-09-15 DIAGNOSIS — K74.60 UNSPECIFIED CIRRHOSIS OF LIVER: ICD-10-CM

## 2021-09-15 DIAGNOSIS — K76.9 LIVER DISEASE, UNSPECIFIED: ICD-10-CM

## 2021-09-15 DIAGNOSIS — K75.81 NONALCOHOLIC STEATOHEPATITIS (NASH): ICD-10-CM

## 2021-09-15 DIAGNOSIS — B18.1 CHRONIC VIRAL HEPATITIS B WITHOUT DELTA-AGENT: ICD-10-CM

## 2021-09-15 PROCEDURE — 76700 US EXAM ABDOM COMPLETE: CPT

## 2021-09-15 PROCEDURE — 76700 US EXAM ABDOM COMPLETE: CPT | Mod: 26

## 2021-09-16 ENCOUNTER — LABORATORY RESULT (OUTPATIENT)
Age: 66
End: 2021-09-16

## 2021-09-16 ENCOUNTER — NON-APPOINTMENT (OUTPATIENT)
Age: 66
End: 2021-09-16

## 2021-09-17 ENCOUNTER — NON-APPOINTMENT (OUTPATIENT)
Age: 66
End: 2021-09-17

## 2021-10-01 ENCOUNTER — NON-APPOINTMENT (OUTPATIENT)
Age: 66
End: 2021-10-01

## 2021-10-12 ENCOUNTER — APPOINTMENT (OUTPATIENT)
Dept: HEPATOLOGY | Facility: CLINIC | Age: 66
End: 2021-10-12
Payer: MEDICARE

## 2021-10-12 VITALS
BODY MASS INDEX: 28.25 KG/M2 | DIASTOLIC BLOOD PRESSURE: 80 MMHG | WEIGHT: 175 LBS | TEMPERATURE: 98 F | SYSTOLIC BLOOD PRESSURE: 115 MMHG | OXYGEN SATURATION: 96 % | HEART RATE: 56 BPM

## 2021-10-12 PROCEDURE — 99214 OFFICE O/P EST MOD 30 MIN: CPT

## 2021-10-12 NOTE — REVIEW OF SYSTEMS
[Negative] : Heme/Lymph [Fever] : no fever [Chills] : no chills [Chest Pain] : no chest pain [Palpitations] : no palpitations [Shortness Of Breath] : no shortness of breath [Wheezing] : no wheezing [Cough] : no cough [Abdominal Pain] : no abdominal pain [Vomiting] : no vomiting [Dysuria] : no dysuria [Itching] : no itching [Confused] : no confusion [Dizziness] : no dizziness [Fainting] : no fainting

## 2021-10-12 NOTE — HISTORY OF PRESENT ILLNESS
[Fatigue] : denies fatigue [Malaise] : denies malaise [Fever] : denies fever [Diffuse Joint Pain (Arthralgias)] : denies arthralgias [Muscle Aches, Generalized (Myalgias)] : denies myalgias [Yellow Skin Or Eyes (Jaundice)] : denies jaundice [Abdominal Pain] : denies abdominal pain [Urine Tests Nonspecific Abnormal Findings Biliuria] : denies dark urine [Light Colored Bowel Movement (Acholic Stools)] : denies pale stools [Insomnia] : denies insomnia [Skin: Rash] : denies rash [Itching (Pruritus)] : denies pruritus [Shortness Of Breath] : denies shortness of breath [Needlestick Exposure] : no needlestick exposure [Infected Sexual Partner] : no infected sexual partner [IV Drug Use] : no IV drug use [Tattoo] : no tattoos [Body Piercing] : no body piercing [Hemodialysis] : no hemodialysis [Transfusion before 1992] : no transfusion before 1992 [Transplant before 1992] : no transplant before 1992 [Incarceration] : no incarceration [Alcohol Abuse] : no alcohol abuse [Autoimmune Disorder] : no autoimmune disorder [Household Contact to HBV] : no household contact to HBV [Travel to Endemic Area] : no travel to an endemic area [Occupational Exposure] : no occupational exposure [Cocaine Use] : no cocaine use [Wt Gain ___ Lbs] : no recent weight gain [Wt Loss ___ Lbs] : no recent weight loss [de-identified] : -10/12/2021: RTC today after completion of MRI at outside facility for f/u regarding HBV (maintained on TDF) and liver lesion. Pt accompanied by wife today and has no complaints. Bought in disc for upload and review; not a good copy pt to obtain a second CD. ECOL scheduled for 12/2021 and maintained on coreg 25mg BID. Denies chest pain/palpitations, SOB, ab pain, n/v, melena/BRBPR, and jaundice.  \par \par - 5/3/31: returns after hospitalization at [facility] 3/25 b/o SMA dissection, saw MARIA INES Messina after discharge, now on clopidogrel. Back pain has resolved.\par \par - 2/4/21: returns after bloodwork. MRI done at [facility] report not available. Had hypotension in December, , but no hematochezia or melena. Feels good, is active. Has not lost weight, BMI 28.2\par \par - 10/19/20: returns after US showed 1 cm liver lesion and MRI showed indeterminate lesion. Doing well. GERD controlled with omeprazole. \par \par - 7/20/20: returns after labs in January. Tolerated carvedilol 25 mg bid, is very active gardening, makes his own wine. Takes tenofovir.\par \par - 11/4/20: return visit\par \par - 6/3/19: here after EGD which showed large esophageal varices. Tolerating carvedilol 12.5 mg bid - I switched from nadolol b/o bradycardia on sub-optimal dose. Today HR in the 50s as always. Feels very energetic - built the handrails of stairs and porch around his house in 3 days, but has longstanding sleep problems at night. \par \par - 7/15/19: doing well, tolerating carvedilol 25 mg bid, but HR 49 and BP sometimes 90s at home - got dizzy at home after getting up quickly; also taking omeprazole for GERD seen on EGD 5/2019.  CT 2/2019 report reviewed with patient.\par \par - initial visit  2/5/19: Mr. KNOX is a 66 year old man with chronic hepatitis B (dx. 2004), on Viread since diagnosis, compensated cirrhosis, large varices that never bled, on carvedilol 25mg BID, GERD, overweight BMI 28. \par \par Alcohol hx: social use. Never more than 1 drink per day.\par \par Workup:\par -09/24/2021: MRI ab-- new 2.2x1.7 cm lesion with suspected enhancing capsule compatible with LI-RADS 5 lesion. 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 Spleen mildly enlarged.\par \par - 10/7/20 MRI (scanned): 6 mm liver lesion inferior R lobe seg 6/7, T2 hyperintense, no definitive arterial enhancement; motion artefact. No overt cirrhotic features. Recommend MRI after 4-6 mo. Moderate fatty replacement-marbling throughout pancreas.\par - 9/25/20 US abdomen: cirrhosis, 1 cm liver lesion indeterminate.\par - 3/17/20 US abdomen: mild hepatic steatosis. No lesion. GB normal.\par - 9/18/19 MRI ([facility]): mildly cirrhotic liver, sub-cm lesions likely cysts. Spleen normal. Stable SMA dissection. Normal GB. 1.1 cm ivanna hepatitis T2 hyperintense and enhancing lymph node. \par - 2/25/19 CT abdomen wwo (scanned): cirrhosis, small probable hemangioma seg IVb. SMA dissection several cm. Atherosclerotic changes. No comparison possible.\par - 5/22/19 EGD: medium-sized varices, not banded. LA-grade C reflux esophagitis. Two gastric inlet patches in the upper esophagus. Mild erosive gastritis, biopsied. Started Omeprazole 40 mg/d. Given bradycardia, switched nadolol 20 mg/d to carvedilol - start 6.25 mg bid, increase to 12.5 mg bid after 3 days if tolerated. Path: nonspecific gastritis, H. pylori negative.\par - 11/2016 MRI abdomen: Cirrhosis. 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 The remaining vessels are patent. No significant varices. IMPRESSION: Cirrhosis. No evidence of hepatoma\par - Colonoscopy: normal in 2011\par \par ROS as below\par

## 2021-10-12 NOTE — ASSESSMENT
[FreeTextEntry1] : Mr. KNOX is a 66 year old man originally from [place] with chronic hepatitis B (dx. 2004), on Viread since diagnosis, compensated cirrhosis, large varices that never bled, on carvedilol 25mg BID, GERD, overweight BMI 28. \par \par 1. HBV/Liver lesion\par -Pt has compensated cirrhosis (no LE/ascites/HE). LFT's WNL \par -EV: medium-sized esophageal varices (EGD 5/22/19) that never bled, on carvedilol 25 mg bid. Repeat EGD schedule for 12/8/2021\par - HBV dx in 2004 unclear source of infection; on Viread since diagnosis and brittany well. In need of HBV BW, will complete today. \par -I have explained the natural history of HBV disease. I have reviewed the modes of transmission and encouraged no sharing of needles, razors, scissors, clippers, etc. I have explained the course of disease progression to cirrhosis (LE edema, ascites, and hepatic encephalopathy, etc.) and increased risk of HCC. I have explained the importance of continued screening with labs and imaging Q6mns. \par -Liver lesion: Repeat MRI 09/24/2021 2.2 x 1.7 cm, outside disc to be uploaded and reviewed by TB as soon as aval. Disc pt bought in today no good and needs to obtain a second copy for uploading. \par - LASSITER - mild NAFLD suggested by past US. BMI 28, I have encouraged a healthy lifestyle including exercising at least 3x times weekly and maintaining a diet low in carbohydrates, fat, sodium (<2gm daily) and cholesterol. \par -I have counseled pt on abstaining from alcohol and illicit drug use, avoid herbal and dietary supplements. Encouraged limit use of acetaminophen to <2gm per day and to avoid NSAIDS.    \par \par 2. HM\par - GERD: LA-grade C reflux esophagitis (EGD 5/2019): takes TUMS every night. PPI helped in past and pt remains on Omeprazole 40mg QD\par - colon cancer screening: scheduled for 12/8/2021\par \par Plan:\par -BW ASAP for HBV f/u\par - liver lesion: MRI from 09/24/2021 to be uploaded and pt will be presented to TB. Disc presented by pt was no good. PT instructed to retrieve a second copy of disc to have uploaded ASAP. \par - varices continue carvedilol to 25 mg bid; repeat ECOL 12/8/2021\par - GERD: continue omeprazole 40mg/QD\par - NAFLD/LASSITER (BMI 28): continued weight loss and exercise. Continue Vit E 800 IU/mL \par - return in 1 month with MD CK; same day as Spouse's NPA as per request\par

## 2021-10-12 NOTE — PHYSICAL EXAM
[Edema] : there was no peripheral edema [General Appearance - Alert] : alert [General Appearance - In No Acute Distress] : in no acute distress [General Appearance - Well Nourished] : well nourished [Sclera] : the sclera and conjunctiva were normal [Outer Ear] : the ears and nose were normal in appearance [Neck Appearance] : the appearance of the neck was normal [Jugular Venous Distention Increased] : there was no jugular-venous distention [Respiration, Rhythm And Depth] : normal respiratory rhythm and effort [Exaggerated Use Of Accessory Muscles For Inspiration] : no accessory muscle use [Heart Rate And Rhythm] : heart rate was normal and rhythm regular [Heart Sounds] : normal S1 and S2 [Bowel Sounds] : normal bowel sounds [Abdomen Soft] : soft [Abdomen Tenderness] : non-tender [Abdomen Mass (___ Cm)] : no abdominal mass palpated [No CVA Tenderness] : no ~M costovertebral angle tenderness [Abnormal Walk] : normal gait [Nail Clubbing] : no clubbing  or cyanosis of the fingernails [Skin Color & Pigmentation] : normal skin color and pigmentation [Skin Turgor] : normal skin turgor [] : no rash [Oriented To Time, Place, And Person] : oriented to person, place, and time [Impaired Insight] : insight and judgment were intact [Affect] : the affect was normal [Scleral Icterus] : No Scleral Icterus [Abdominal Bruit] : no abdominal bruit [Abdominal  Ascites] : no ascites [Jaundice] : No jaundice

## 2021-10-13 LAB
HBV E AB SER QL: REACTIVE
HBV E AG SER QL: NONREACTIVE
HBV SURFACE AB SER QL: NONREACTIVE
HBV SURFACE AG SER QL: REACTIVE

## 2021-10-14 LAB
HBV DNA # SERPL NAA+PROBE: NOT DETECTED IU/ML
HEPB DNA PCR LOG: NOT DETECTED LOG10IU/ML

## 2021-10-22 ENCOUNTER — APPOINTMENT (OUTPATIENT)
Dept: HEPATOLOGY | Facility: CLINIC | Age: 66
End: 2021-10-22

## 2021-11-10 ENCOUNTER — APPOINTMENT (OUTPATIENT)
Dept: TRANSPLANT | Facility: CLINIC | Age: 66
End: 2021-11-10
Payer: MEDICARE

## 2021-11-10 VITALS
TEMPERATURE: 98 F | BODY MASS INDEX: 28.28 KG/M2 | WEIGHT: 176 LBS | OXYGEN SATURATION: 97 % | HEIGHT: 66 IN | RESPIRATION RATE: 12 BRPM | DIASTOLIC BLOOD PRESSURE: 76 MMHG | HEART RATE: 45 BPM | SYSTOLIC BLOOD PRESSURE: 118 MMHG

## 2021-11-10 PROCEDURE — 99205 OFFICE O/P NEW HI 60 MIN: CPT

## 2021-11-10 PROCEDURE — 99215 OFFICE O/P EST HI 40 MIN: CPT

## 2021-11-10 NOTE — ASSESSMENT
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\par \par discussed diagnosis\par -based on imaging, likely HCC, natural history of disease discussed\par -treatment of choice is surgical resection - only curative option\par -alternate treatment options discussed: ablation, systemic, conservative\par \par Surgical risks discussed:\par GA risks - MI, CVA, pneumonia, PE\par surgical risks - bleeding, infection, bile leak, injury to surrounding structures, insufficient liver remnant\par oncological risks - HCC recurrence, need for further treatment\par \par \par Plan\par Patient is planned for CT chest next week\par Will require cardiology clearance for surgery\par presurgical testing\par will need to stop clopidogrel 5 days preop\par for segment 6 liver resection

## 2021-11-10 NOTE — PHYSICAL EXAM
[Normal] : supple, no neck mass and thyroid not enlarged [Normal Neck Lymph Nodes] : normal neck lymph nodes  [Normal Supraclavicular Lymph Nodes] : normal supraclavicular lymph nodes [Normal Groin Lymph Nodes] : normal groin lymph nodes [Normal Axillary Lymph Nodes] : normal axillary lymph nodes [Normal] : oriented to person, place and time, with appropriate affect [de-identified] : no abdo pain, distension, ascites, organomegaly

## 2021-11-10 NOTE — HISTORY OF PRESENT ILLNESS
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

## 2021-11-16 ENCOUNTER — APPOINTMENT (OUTPATIENT)
Dept: CT IMAGING | Facility: IMAGING CENTER | Age: 66
End: 2021-11-16
Payer: MEDICARE

## 2021-11-16 ENCOUNTER — OUTPATIENT (OUTPATIENT)
Dept: OUTPATIENT SERVICES | Facility: HOSPITAL | Age: 66
LOS: 1 days | End: 2021-11-16
Payer: COMMERCIAL

## 2021-11-16 DIAGNOSIS — C22.0 LIVER CELL CARCINOMA: ICD-10-CM

## 2021-11-16 DIAGNOSIS — Z00.8 ENCOUNTER FOR OTHER GENERAL EXAMINATION: ICD-10-CM

## 2021-11-16 PROCEDURE — 71260 CT THORAX DX C+: CPT | Mod: 26

## 2021-11-16 PROCEDURE — 82565 ASSAY OF CREATININE: CPT

## 2021-11-16 PROCEDURE — 71260 CT THORAX DX C+: CPT

## 2021-11-22 ENCOUNTER — APPOINTMENT (OUTPATIENT)
Dept: HEPATOLOGY | Facility: CLINIC | Age: 66
End: 2021-11-22

## 2021-12-08 ENCOUNTER — APPOINTMENT (OUTPATIENT)
Dept: HEPATOLOGY | Facility: HOSPITAL | Age: 66
End: 2021-12-08

## 2021-12-17 ENCOUNTER — OUTPATIENT (OUTPATIENT)
Dept: OUTPATIENT SERVICES | Facility: HOSPITAL | Age: 66
LOS: 1 days | End: 2021-12-17
Payer: COMMERCIAL

## 2021-12-17 VITALS
OXYGEN SATURATION: 98 % | RESPIRATION RATE: 16 BRPM | SYSTOLIC BLOOD PRESSURE: 121 MMHG | TEMPERATURE: 98 F | DIASTOLIC BLOOD PRESSURE: 82 MMHG | HEIGHT: 67 IN | WEIGHT: 169.98 LBS | HEART RATE: 60 BPM

## 2021-12-17 DIAGNOSIS — Z87.19 PERSONAL HISTORY OF OTHER DISEASES OF THE DIGESTIVE SYSTEM: ICD-10-CM

## 2021-12-17 DIAGNOSIS — I10 ESSENTIAL (PRIMARY) HYPERTENSION: ICD-10-CM

## 2021-12-17 DIAGNOSIS — Z78.9 OTHER SPECIFIED HEALTH STATUS: ICD-10-CM

## 2021-12-17 DIAGNOSIS — Z01.818 ENCOUNTER FOR OTHER PREPROCEDURAL EXAMINATION: ICD-10-CM

## 2021-12-17 DIAGNOSIS — D49.0 NEOPLASM OF UNSPECIFIED BEHAVIOR OF DIGESTIVE SYSTEM: ICD-10-CM

## 2021-12-17 DIAGNOSIS — G47.33 OBSTRUCTIVE SLEEP APNEA (ADULT) (PEDIATRIC): ICD-10-CM

## 2021-12-17 DIAGNOSIS — C22.0 LIVER CELL CARCINOMA: ICD-10-CM

## 2021-12-17 DIAGNOSIS — I25.10 ATHEROSCLEROTIC HEART DISEASE OF NATIVE CORONARY ARTERY WITHOUT ANGINA PECTORIS: ICD-10-CM

## 2021-12-17 DIAGNOSIS — Z98.890 OTHER SPECIFIED POSTPROCEDURAL STATES: Chronic | ICD-10-CM

## 2021-12-17 DIAGNOSIS — C22.0 LIVER CELL CARCINOMA: Chronic | ICD-10-CM

## 2021-12-17 DIAGNOSIS — B19.10 UNSPECIFIED VIRAL HEPATITIS B WITHOUT HEPATIC COMA: Chronic | ICD-10-CM

## 2021-12-17 DIAGNOSIS — Z29.9 ENCOUNTER FOR PROPHYLACTIC MEASURES, UNSPECIFIED: ICD-10-CM

## 2021-12-17 LAB
ALBUMIN SERPL ELPH-MCNC: 4.9 G/DL — SIGNIFICANT CHANGE UP (ref 3.3–5)
ALP SERPL-CCNC: 61 U/L — SIGNIFICANT CHANGE UP (ref 40–120)
ALT FLD-CCNC: 22 U/L — SIGNIFICANT CHANGE UP (ref 10–45)
ANION GAP SERPL CALC-SCNC: 15 MMOL/L — SIGNIFICANT CHANGE UP (ref 5–17)
APTT BLD: 27.9 SEC — SIGNIFICANT CHANGE UP (ref 27.5–35.5)
AST SERPL-CCNC: 20 U/L — SIGNIFICANT CHANGE UP (ref 10–40)
BILIRUB SERPL-MCNC: 0.6 MG/DL — SIGNIFICANT CHANGE UP (ref 0.2–1.2)
BLD GP AB SCN SERPL QL: NEGATIVE — SIGNIFICANT CHANGE UP
BUN SERPL-MCNC: 24 MG/DL — HIGH (ref 7–23)
CALCIUM SERPL-MCNC: 9.5 MG/DL — SIGNIFICANT CHANGE UP (ref 8.4–10.5)
CHLORIDE SERPL-SCNC: 102 MMOL/L — SIGNIFICANT CHANGE UP (ref 96–108)
CO2 SERPL-SCNC: 21 MMOL/L — LOW (ref 22–31)
CREAT SERPL-MCNC: 1.08 MG/DL — SIGNIFICANT CHANGE UP (ref 0.5–1.3)
GLUCOSE SERPL-MCNC: 75 MG/DL — SIGNIFICANT CHANGE UP (ref 70–99)
HCT VFR BLD CALC: 45.8 % — SIGNIFICANT CHANGE UP (ref 39–50)
HGB BLD-MCNC: 15.1 G/DL — SIGNIFICANT CHANGE UP (ref 13–17)
INR BLD: 1.03 RATIO — SIGNIFICANT CHANGE UP (ref 0.88–1.16)
MCHC RBC-ENTMCNC: 29 PG — SIGNIFICANT CHANGE UP (ref 27–34)
MCHC RBC-ENTMCNC: 33 GM/DL — SIGNIFICANT CHANGE UP (ref 32–36)
MCV RBC AUTO: 87.9 FL — SIGNIFICANT CHANGE UP (ref 80–100)
NRBC # BLD: 0 /100 WBCS — SIGNIFICANT CHANGE UP (ref 0–0)
PLATELET # BLD AUTO: 126 K/UL — LOW (ref 150–400)
POTASSIUM SERPL-MCNC: 4.6 MMOL/L — SIGNIFICANT CHANGE UP (ref 3.5–5.3)
POTASSIUM SERPL-SCNC: 4.6 MMOL/L — SIGNIFICANT CHANGE UP (ref 3.5–5.3)
PROT SERPL-MCNC: 7.8 G/DL — SIGNIFICANT CHANGE UP (ref 6–8.3)
PROTHROM AB SERPL-ACNC: 12.3 SEC — SIGNIFICANT CHANGE UP (ref 10.6–13.6)
RBC # BLD: 5.21 M/UL — SIGNIFICANT CHANGE UP (ref 4.2–5.8)
RBC # FLD: 13.5 % — SIGNIFICANT CHANGE UP (ref 10.3–14.5)
RH IG SCN BLD-IMP: POSITIVE — SIGNIFICANT CHANGE UP
SODIUM SERPL-SCNC: 138 MMOL/L — SIGNIFICANT CHANGE UP (ref 135–145)
WBC # BLD: 5.19 K/UL — SIGNIFICANT CHANGE UP (ref 3.8–10.5)
WBC # FLD AUTO: 5.19 K/UL — SIGNIFICANT CHANGE UP (ref 3.8–10.5)

## 2021-12-17 PROCEDURE — 80053 COMPREHEN METABOLIC PANEL: CPT

## 2021-12-17 PROCEDURE — 85610 PROTHROMBIN TIME: CPT

## 2021-12-17 PROCEDURE — 86901 BLOOD TYPING SEROLOGIC RH(D): CPT

## 2021-12-17 PROCEDURE — 83036 HEMOGLOBIN GLYCOSYLATED A1C: CPT

## 2021-12-17 PROCEDURE — 86850 RBC ANTIBODY SCREEN: CPT

## 2021-12-17 PROCEDURE — 85730 THROMBOPLASTIN TIME PARTIAL: CPT

## 2021-12-17 PROCEDURE — G0463: CPT

## 2021-12-17 PROCEDURE — 85027 COMPLETE CBC AUTOMATED: CPT

## 2021-12-17 PROCEDURE — 86900 BLOOD TYPING SEROLOGIC ABO: CPT

## 2021-12-17 NOTE — H&P PST ADULT - PROBLEM SELECTOR PLAN 1
Liver Resection of Segment 6 Liver Tumor on 12/28/21  Pre-op instructions, including Chlorhexidine soap, provided - all questions answered  Labs done at [facility]  COVID-19 testing on 12/26 at [facility]

## 2021-12-17 NOTE — H&P PST ADULT - LAST ECHOCARDIOGRAM
7/7/21 EF 68%, dilated aortic root 4.4 cm at the root 4.5 at the ascending portion - cardiology notes

## 2021-12-17 NOTE — H&P PST ADULT - ATTENDING COMMENTS
For resection of seg 6 liver tumor  Risks explained via   GA risks  surgical risks: bleeding, infection, bile leak  oncological risks: tumor recurrence  liver risks: liver insufficiency    patient understands and accepts risks, consent signed

## 2021-12-17 NOTE — H&P PST ADULT - NSICDXPASTSURGICALHX_GEN_ALL_CORE_FT
PAST SURGICAL HISTORY:  No significant past surgical history      PAST SURGICAL HISTORY:  S/P colonoscopy     S/P endoscopy

## 2021-12-17 NOTE — H&P PST ADULT - HISTORY OF PRESENT ILLNESS
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 had pre-op clearance by cardiology - Dr. Cristino Byrd. Pt. presents to PST for scheduled      COVID-19 testing on    Cardiac evaluation in chart. Pt. will hold Plavix 5 days before surgery as per surgeon and cardiology  no covid Face Time with daughter, Makayla, to translate in Tunisian - pt. 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 had pre-op clearance by cardiology - Dr. Cristino Byrd. Pt. presents to PST for scheduled Liver Resection of Segment 6 Liver Tumor on 12/28/21. Pt. denies COVID-19 infection in 2020/2021, denies close contact with anyone that has been ill, no fever, cough, dyspnea in past two weeks, denies any abdominal pain, N/V.    COVID-19 testing on 12/26/21 at Replaced by [facility]    Cardiac evaluation in chart. Pt. will hold Plavix 5 days before surgery as per surgeon and cardiology

## 2021-12-17 NOTE — H&P PST ADULT - NSICDXPASTMEDICALHX_GEN_ALL_CORE_FT
PAST MEDICAL HISTORY:  Hypertension     Liver disease      PAST MEDICAL HISTORY:  H/O esophageal varices non-bleeding, on Carvedilol    H/O sinus bradycardia 40's-50's normal HR    HBV (hepatitis B virus) infection on tenofovir    HCC (hepatocellular carcinoma)     Hypertension     Language barrier speaks Greek    Liver disease     Nonalcoholic fatty liver disease without nonalcoholic steatohepatitis (LASSITER)      PAST MEDICAL HISTORY:  H/O esophageal varices non-bleeding, on Carvedilol    H/O sinus bradycardia 40's-50's normal HR    HBV (hepatitis B virus) infection on tenofovir    HCC (hepatocellular carcinoma)     Hypertension     Language barrier speaks Ukrainian    Liver disease     Nonalcoholic fatty liver disease without nonalcoholic steatohepatitis (LASSITER)     Pre-diabetes

## 2021-12-17 NOTE — H&P PST ADULT - ASSESSMENT
BRIANNEI VTE 2.0 SCORE [CLOT updated 2019]    AGE RELATED RISK FACTORS                                                       MOBILITY RELATED FACTORS  [ ] Age 41-60 years                                            (1 Point)                    [ ] Bed rest                                                        (1 Point)  [x ] Age: 61-74 years                                           (2 Points)                  [ ] Plaster cast                                                   (2 Points)  [ ] Age= 75 years                                              (3 Points)                    [ ] Bed bound for more than 72 hours                 (2 Points)    DISEASE RELATED RISK FACTORS                                               GENDER SPECIFIC FACTORS  [ ] Edema in the lower extremities                       (1 Point)              [ ] Pregnancy                                                     (1 Point)  [ ] Varicose veins                                               (1 Point)                     [ ] Post-partum < 6 weeks                                   (1 Point)             [x ] BMI > 25 Kg/m2                                            (1 Point)                     [ ] Hormonal therapy  or oral contraception          (1 Point)                 [ ] Sepsis (in the previous month)                        (1 Point)               [ ] History of pregnancy complications                 (1 point)  [ ] Pneumonia or serious lung disease                                               [ ] Unexplained or recurrent                     (1 Point)           (in the previous month)                               (1 Point)  [ ] Abnormal pulmonary function test                     (1 Point)                 SURGERY RELATED RISK FACTORS  [ ] Acute myocardial infarction                              (1 Point)               [ ]  Section                                             (1 Point)  [ ] Congestive heart failure (in the previous month)  (1 Point)      [ ] Minor surgery                                                  (1 Point)   [ ] Inflammatory bowel disease                             (1 Point)               [ ] Arthroscopic surgery                                        (2 Points)  [ ] Central venous access                                      (2 Points)                [x ] General surgery lasting more than 45 minutes (2 points)  [x ] Malignancy- Present or previous                   (2 Points)                [ ] Elective arthroplasty                                         (5 points)    [ ] Stroke (in the previous month)                          (5 Points)                                                                                                                                                           HEMATOLOGY RELATED FACTORS                                                 TRAUMA RELATED RISK FACTORS  [ ] Prior episodes of VTE                                     (3 Points)                [ ] Fracture of the hip, pelvis, or leg                       (5 Points)  [ ] Positive family history for VTE                         (3 Points)             [ ] Acute spinal cord injury (in the previous month)  (5 Points)  [ ] Prothrombin 21104 A                                     (3 Points)               [ ] Paralysis  (less than 1 month)                             (5 Points)  [ ] Factor V Leiden                                             (3 Points)                  [ ] Multiple Trauma within 1 month                        (5 Points)  [ ] Lupus anticoagulants                                     (3 Points)                                                           [ ] Anticardiolipin antibodies                               (3 Points)                                                       [ ] High homocysteine in the blood                      (3 Points)                                             [ ] Other congenital or acquired thrombophilia      (3 Points)                                                [ ] Heparin induced thrombocytopenia                  (3 Points)                                     Total Score [      7    ]

## 2021-12-17 NOTE — H&P PST ADULT - NSANTHOSAYNRD_GEN_A_CORE
No. CLARA screening performed.  STOP BANG Legend: 0-2 = LOW Risk; 3-4 = INTERMEDIATE Risk; 5-8 = HIGH Risk

## 2021-12-17 NOTE — H&P PST ADULT - ACTIVITY
Super - walks a lot in the building, goes up an down the stiars Superintendent - walks a lot in the building, goes up an down the stiars

## 2021-12-18 LAB
A1C WITH ESTIMATED AVERAGE GLUCOSE RESULT: 6 % — HIGH (ref 4–5.6)
ESTIMATED AVERAGE GLUCOSE: 126 MG/DL — HIGH (ref 68–114)

## 2021-12-21 PROBLEM — Z78.9 OTHER SPECIFIED HEALTH STATUS: Chronic | Status: ACTIVE | Noted: 2021-12-17

## 2021-12-21 PROBLEM — K76.0 FATTY (CHANGE OF) LIVER, NOT ELSEWHERE CLASSIFIED: Chronic | Status: ACTIVE | Noted: 2021-12-17

## 2021-12-21 PROBLEM — Z86.79 PERSONAL HISTORY OF OTHER DISEASES OF THE CIRCULATORY SYSTEM: Chronic | Status: ACTIVE | Noted: 2021-12-17

## 2021-12-21 PROBLEM — C22.0 LIVER CELL CARCINOMA: Chronic | Status: ACTIVE | Noted: 2021-12-17

## 2021-12-21 PROBLEM — Z87.19 PERSONAL HISTORY OF OTHER DISEASES OF THE DIGESTIVE SYSTEM: Chronic | Status: ACTIVE | Noted: 2021-12-17

## 2021-12-21 PROBLEM — R73.03 PREDIABETES: Chronic | Status: ACTIVE | Noted: 2021-12-17

## 2021-12-21 PROBLEM — B19.10 UNSPECIFIED VIRAL HEPATITIS B WITHOUT HEPATIC COMA: Chronic | Status: ACTIVE | Noted: 2021-12-17

## 2021-12-22 ENCOUNTER — APPOINTMENT (OUTPATIENT)
Dept: HEPATOLOGY | Facility: HOSPITAL | Age: 66
End: 2021-12-22

## 2021-12-26 ENCOUNTER — OUTPATIENT (OUTPATIENT)
Dept: OUTPATIENT SERVICES | Facility: HOSPITAL | Age: 66
LOS: 1 days | End: 2021-12-26
Payer: COMMERCIAL

## 2021-12-26 DIAGNOSIS — Z98.890 OTHER SPECIFIED POSTPROCEDURAL STATES: Chronic | ICD-10-CM

## 2021-12-26 DIAGNOSIS — Z11.52 ENCOUNTER FOR SCREENING FOR COVID-19: ICD-10-CM

## 2021-12-26 LAB — SARS-COV-2 RNA SPEC QL NAA+PROBE: SIGNIFICANT CHANGE UP

## 2021-12-26 PROCEDURE — U0003: CPT

## 2021-12-26 PROCEDURE — C9803: CPT

## 2021-12-26 PROCEDURE — U0005: CPT

## 2021-12-27 ENCOUNTER — TRANSCRIPTION ENCOUNTER (OUTPATIENT)
Age: 66
End: 2021-12-27

## 2021-12-27 VITALS — WEIGHT: 169.98 LBS | HEIGHT: 67 IN

## 2021-12-27 NOTE — PRE-ANESTHESIA EVALUATION ADULT - NSANTHPMHFT_GEN_ALL_CORE
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    Positive stress test 3/23/21, Cath 3/25/21 showed no obstructive CAD and started on Plavix. Echo 7/7/21 with nml EF. 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    Positive stress test 3/23/21, Cath 3/25/21 showed no obstructive CAD and started on Plavix. Echo 7/7/21 with nml EF.    Plavix held since last Tuesday per pt - 6 days.

## 2021-12-28 ENCOUNTER — RESULT REVIEW (OUTPATIENT)
Age: 66
End: 2021-12-28

## 2021-12-28 ENCOUNTER — APPOINTMENT (OUTPATIENT)
Dept: TRANSPLANT | Facility: HOSPITAL | Age: 66
End: 2021-12-28

## 2021-12-28 ENCOUNTER — INPATIENT (INPATIENT)
Facility: HOSPITAL | Age: 66
LOS: 2 days | Discharge: HOME | DRG: 405 | End: 2021-12-31
Attending: TRANSPLANT SURGERY | Admitting: TRANSPLANT SURGERY
Payer: COMMERCIAL

## 2021-12-28 VITALS
RESPIRATION RATE: 18 BRPM | OXYGEN SATURATION: 95 % | WEIGHT: 169.98 LBS | TEMPERATURE: 98 F | HEART RATE: 55 BPM | SYSTOLIC BLOOD PRESSURE: 141 MMHG | DIASTOLIC BLOOD PRESSURE: 93 MMHG | HEIGHT: 67.01 IN

## 2021-12-28 DIAGNOSIS — Z98.890 OTHER SPECIFIED POSTPROCEDURAL STATES: Chronic | ICD-10-CM

## 2021-12-28 DIAGNOSIS — D49.0 NEOPLASM OF UNSPECIFIED BEHAVIOR OF DIGESTIVE SYSTEM: ICD-10-CM

## 2021-12-28 LAB
ALBUMIN SERPL ELPH-MCNC: 4 G/DL — SIGNIFICANT CHANGE UP (ref 3.3–5)
ALP SERPL-CCNC: 47 U/L — SIGNIFICANT CHANGE UP (ref 40–120)
ALT FLD-CCNC: 144 U/L — HIGH (ref 10–45)
ANION GAP SERPL CALC-SCNC: 15 MMOL/L — SIGNIFICANT CHANGE UP (ref 5–17)
ANION GAP SERPL CALC-SCNC: 16 MMOL/L — SIGNIFICANT CHANGE UP (ref 5–17)
APTT BLD: 27 SEC — LOW (ref 27.5–35.5)
AST SERPL-CCNC: 156 U/L — HIGH (ref 10–40)
BILIRUB DIRECT SERPL-MCNC: 0.3 MG/DL — SIGNIFICANT CHANGE UP (ref 0–0.3)
BILIRUB INDIRECT FLD-MCNC: 0.7 MG/DL — SIGNIFICANT CHANGE UP (ref 0.2–1)
BILIRUB SERPL-MCNC: 1 MG/DL — SIGNIFICANT CHANGE UP (ref 0.2–1.2)
BUN SERPL-MCNC: 18 MG/DL — SIGNIFICANT CHANGE UP (ref 7–23)
BUN SERPL-MCNC: 18 MG/DL — SIGNIFICANT CHANGE UP (ref 7–23)
CALCIUM SERPL-MCNC: 8.9 MG/DL — SIGNIFICANT CHANGE UP (ref 8.4–10.5)
CALCIUM SERPL-MCNC: 9 MG/DL — SIGNIFICANT CHANGE UP (ref 8.4–10.5)
CHLORIDE SERPL-SCNC: 103 MMOL/L — SIGNIFICANT CHANGE UP (ref 96–108)
CHLORIDE SERPL-SCNC: 104 MMOL/L — SIGNIFICANT CHANGE UP (ref 96–108)
CO2 SERPL-SCNC: 19 MMOL/L — LOW (ref 22–31)
CO2 SERPL-SCNC: 20 MMOL/L — LOW (ref 22–31)
CREAT SERPL-MCNC: 0.93 MG/DL — SIGNIFICANT CHANGE UP (ref 0.5–1.3)
CREAT SERPL-MCNC: 1 MG/DL — SIGNIFICANT CHANGE UP (ref 0.5–1.3)
GAS PNL BLDA: SIGNIFICANT CHANGE UP
GLUCOSE BLDC GLUCOMTR-MCNC: 106 MG/DL — HIGH (ref 70–99)
GLUCOSE SERPL-MCNC: 140 MG/DL — HIGH (ref 70–99)
GLUCOSE SERPL-MCNC: 176 MG/DL — HIGH (ref 70–99)
HCT VFR BLD CALC: 37.2 % — LOW (ref 39–50)
HCT VFR BLD CALC: 38.1 % — LOW (ref 39–50)
HGB BLD-MCNC: 12.3 G/DL — LOW (ref 13–17)
HGB BLD-MCNC: 12.8 G/DL — LOW (ref 13–17)
INR BLD: 1.18 RATIO — HIGH (ref 0.88–1.16)
MAGNESIUM SERPL-MCNC: 2 MG/DL — SIGNIFICANT CHANGE UP (ref 1.6–2.6)
MAGNESIUM SERPL-MCNC: 2.1 MG/DL — SIGNIFICANT CHANGE UP (ref 1.6–2.6)
MCHC RBC-ENTMCNC: 28.5 PG — SIGNIFICANT CHANGE UP (ref 27–34)
MCHC RBC-ENTMCNC: 29.2 PG — SIGNIFICANT CHANGE UP (ref 27–34)
MCHC RBC-ENTMCNC: 33.1 GM/DL — SIGNIFICANT CHANGE UP (ref 32–36)
MCHC RBC-ENTMCNC: 33.6 GM/DL — SIGNIFICANT CHANGE UP (ref 32–36)
MCV RBC AUTO: 86.3 FL — SIGNIFICANT CHANGE UP (ref 80–100)
MCV RBC AUTO: 87 FL — SIGNIFICANT CHANGE UP (ref 80–100)
NRBC # BLD: 0 /100 WBCS — SIGNIFICANT CHANGE UP (ref 0–0)
NRBC # BLD: 0 /100 WBCS — SIGNIFICANT CHANGE UP (ref 0–0)
PHOSPHATE SERPL-MCNC: 2.8 MG/DL — SIGNIFICANT CHANGE UP (ref 2.5–4.5)
PHOSPHATE SERPL-MCNC: 4.9 MG/DL — HIGH (ref 2.5–4.5)
PLATELET # BLD AUTO: 106 K/UL — LOW (ref 150–400)
PLATELET # BLD AUTO: 94 K/UL — LOW (ref 150–400)
POTASSIUM SERPL-MCNC: 3.9 MMOL/L — SIGNIFICANT CHANGE UP (ref 3.5–5.3)
POTASSIUM SERPL-MCNC: 4.5 MMOL/L — SIGNIFICANT CHANGE UP (ref 3.5–5.3)
POTASSIUM SERPL-SCNC: 3.9 MMOL/L — SIGNIFICANT CHANGE UP (ref 3.5–5.3)
POTASSIUM SERPL-SCNC: 4.5 MMOL/L — SIGNIFICANT CHANGE UP (ref 3.5–5.3)
PROT SERPL-MCNC: 5.8 G/DL — LOW (ref 6–8.3)
PROTHROM AB SERPL-ACNC: 14.1 SEC — HIGH (ref 10.6–13.6)
RBC # BLD: 4.31 M/UL — SIGNIFICANT CHANGE UP (ref 4.2–5.8)
RBC # BLD: 4.38 M/UL — SIGNIFICANT CHANGE UP (ref 4.2–5.8)
RBC # FLD: 13.2 % — SIGNIFICANT CHANGE UP (ref 10.3–14.5)
RBC # FLD: 13.4 % — SIGNIFICANT CHANGE UP (ref 10.3–14.5)
RH IG SCN BLD-IMP: POSITIVE — SIGNIFICANT CHANGE UP
SODIUM SERPL-SCNC: 138 MMOL/L — SIGNIFICANT CHANGE UP (ref 135–145)
SODIUM SERPL-SCNC: 139 MMOL/L — SIGNIFICANT CHANGE UP (ref 135–145)
WBC # BLD: 6.83 K/UL — SIGNIFICANT CHANGE UP (ref 3.8–10.5)
WBC # BLD: 8.66 K/UL — SIGNIFICANT CHANGE UP (ref 3.8–10.5)
WBC # FLD AUTO: 6.83 K/UL — SIGNIFICANT CHANGE UP (ref 3.8–10.5)
WBC # FLD AUTO: 8.66 K/UL — SIGNIFICANT CHANGE UP (ref 3.8–10.5)

## 2021-12-28 PROCEDURE — 88309 TISSUE EXAM BY PATHOLOGIST: CPT | Mod: 26

## 2021-12-28 PROCEDURE — 99223 1ST HOSP IP/OBS HIGH 75: CPT

## 2021-12-28 PROCEDURE — 88342 IMHCHEM/IMCYTCHM 1ST ANTB: CPT | Mod: 26

## 2021-12-28 PROCEDURE — 76705 ECHO EXAM OF ABDOMEN: CPT | Mod: 26,GC

## 2021-12-28 PROCEDURE — 88313 SPECIAL STAINS GROUP 2: CPT | Mod: 26

## 2021-12-28 PROCEDURE — 88304 TISSUE EXAM BY PATHOLOGIST: CPT | Mod: 26

## 2021-12-28 PROCEDURE — 47120 PARTIAL REMOVAL OF LIVER: CPT

## 2021-12-28 PROCEDURE — 47120 PARTIAL REMOVAL OF LIVER: CPT | Mod: 82

## 2021-12-28 PROCEDURE — 71045 X-RAY EXAM CHEST 1 VIEW: CPT | Mod: 26

## 2021-12-28 RX ORDER — ONDANSETRON 8 MG/1
4 TABLET, FILM COATED ORAL EVERY 6 HOURS
Refills: 0 | Status: DISCONTINUED | OUTPATIENT
Start: 2021-12-28 | End: 2021-12-31

## 2021-12-28 RX ORDER — POLYETHYLENE GLYCOL 3350 17 G/17G
17 POWDER, FOR SOLUTION ORAL DAILY
Refills: 0 | Status: DISCONTINUED | OUTPATIENT
Start: 2021-12-28 | End: 2021-12-31

## 2021-12-28 RX ORDER — OXYCODONE HYDROCHLORIDE 5 MG/1
10 TABLET ORAL EVERY 4 HOURS
Refills: 0 | Status: DISCONTINUED | OUTPATIENT
Start: 2021-12-28 | End: 2021-12-29

## 2021-12-28 RX ORDER — POTASSIUM PHOSPHATE, MONOBASIC POTASSIUM PHOSPHATE, DIBASIC 236; 224 MG/ML; MG/ML
15 INJECTION, SOLUTION INTRAVENOUS ONCE
Refills: 0 | Status: COMPLETED | OUTPATIENT
Start: 2021-12-28 | End: 2021-12-28

## 2021-12-28 RX ORDER — CHLORHEXIDINE GLUCONATE 213 G/1000ML
1 SOLUTION TOPICAL ONCE
Refills: 0 | Status: DISCONTINUED | OUTPATIENT
Start: 2021-12-28 | End: 2021-12-28

## 2021-12-28 RX ORDER — PANTOPRAZOLE SODIUM 20 MG/1
40 TABLET, DELAYED RELEASE ORAL
Refills: 0 | Status: DISCONTINUED | OUTPATIENT
Start: 2021-12-28 | End: 2021-12-31

## 2021-12-28 RX ORDER — CHLORHEXIDINE GLUCONATE 213 G/1000ML
1 SOLUTION TOPICAL DAILY
Refills: 0 | Status: DISCONTINUED | OUTPATIENT
Start: 2021-12-28 | End: 2021-12-31

## 2021-12-28 RX ORDER — NALBUPHINE HYDROCHLORIDE 10 MG/ML
2.5 INJECTION, SOLUTION INTRAMUSCULAR; INTRAVENOUS; SUBCUTANEOUS EVERY 6 HOURS
Refills: 0 | Status: DISCONTINUED | OUTPATIENT
Start: 2021-12-28 | End: 2021-12-29

## 2021-12-28 RX ORDER — SODIUM CHLORIDE 9 MG/ML
1000 INJECTION, SOLUTION INTRAVENOUS
Refills: 0 | Status: DISCONTINUED | OUTPATIENT
Start: 2021-12-28 | End: 2021-12-29

## 2021-12-28 RX ORDER — SODIUM CHLORIDE 9 MG/ML
3 INJECTION INTRAMUSCULAR; INTRAVENOUS; SUBCUTANEOUS EVERY 8 HOURS
Refills: 0 | Status: DISCONTINUED | OUTPATIENT
Start: 2021-12-28 | End: 2021-12-28

## 2021-12-28 RX ORDER — OXYCODONE HYDROCHLORIDE 5 MG/1
5 TABLET ORAL EVERY 4 HOURS
Refills: 0 | Status: DISCONTINUED | OUTPATIENT
Start: 2021-12-28 | End: 2021-12-29

## 2021-12-28 RX ORDER — MORPHINE SULFATE 50 MG/1
0.2 CAPSULE, EXTENDED RELEASE ORAL ONCE
Refills: 0 | Status: DISCONTINUED | OUTPATIENT
Start: 2021-12-28 | End: 2021-12-29

## 2021-12-28 RX ORDER — LIDOCAINE HCL 20 MG/ML
0.2 VIAL (ML) INJECTION ONCE
Refills: 0 | Status: DISCONTINUED | OUTPATIENT
Start: 2021-12-28 | End: 2021-12-28

## 2021-12-28 RX ORDER — NALOXONE HYDROCHLORIDE 4 MG/.1ML
0.1 SPRAY NASAL
Refills: 0 | Status: DISCONTINUED | OUTPATIENT
Start: 2021-12-28 | End: 2021-12-31

## 2021-12-28 RX ORDER — SENNA PLUS 8.6 MG/1
2 TABLET ORAL AT BEDTIME
Refills: 0 | Status: DISCONTINUED | OUTPATIENT
Start: 2021-12-28 | End: 2021-12-31

## 2021-12-28 RX ORDER — CEFAZOLIN SODIUM 1 G
2000 VIAL (EA) INJECTION ONCE
Refills: 0 | Status: DISCONTINUED | OUTPATIENT
Start: 2021-12-28 | End: 2021-12-29

## 2021-12-28 RX ADMIN — SENNA PLUS 2 TABLET(S): 8.6 TABLET ORAL at 21:26

## 2021-12-28 RX ADMIN — POTASSIUM PHOSPHATE, MONOBASIC POTASSIUM PHOSPHATE, DIBASIC 62.5 MILLIMOLE(S): 236; 224 INJECTION, SOLUTION INTRAVENOUS at 16:37

## 2021-12-28 NOTE — CONSULT NOTE ADULT - ATTENDING COMMENTS
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  Patient required Phenylephrine and Norepinephrine during case.  Patient received in SICU extubated.    Awake and alert  Pain control with oral meds  Hemodynamically stable saturating well on RA  IVF  Clears  Monitor LFT renal function electrolytes especially PO4  Mechanical DVT ppx

## 2021-12-28 NOTE — CONSULT NOTE ADULT - ASSESSMENT
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  Patient required Phenylephrine and Norepinephrine during case.  Patient received in SICU extubated, no longer requiring vasopressor support.     Neuro: acute post-op pain control  - s/p Duramorph injection as beginning of OR  - Oxycodone prn     Resp:  - No acute issues  - O2 prn   - Incentive spirometry     CV: h/o non-obstructive cardiomyopathy  - Hemodynamic monitoring  - Hold Carvedilol for now as patient recently required vasopressor support     GI: s/p segment 6 hepatectomy, cholecystectomy; h/o HCC, HBV, gastric varices  - Clears  - Protonix  - Miralax and Senna   - Trend LFTs     /Renal:  - Plasmalyte @ 125mL/hr  - Monitor UOP  - Replete electrolytes as needed, BMP q 6hrs     ID:  - No acute issues, monitor WBV    Heme: VTE prophylaxis  - Hold x 24 hrs as per surgery  - CBC q 6hrs  - SCDs    Endo:  - Monitor serum glucose     Lines:  - L radial A-line  - Had R IJ Cordis during OR, now removed.  f/u CXR    Dispo: Full code.  SICU care.  Case discussed with Dr. Gomez.    Jenifer Rodriguez, PA-C #[id number]

## 2021-12-28 NOTE — PATIENT PROFILE ADULT - NSPROHMSYMPCOND_GEN_A_NUR
Modify Regimen: Increase spironolactone to 100mg daily
Initiate Treatment: Restart the Tretinoin you have pea size amount to face every third night and work up to nightly
Detail Level: Simple
none

## 2021-12-28 NOTE — PROGRESS NOTE ADULT - SUBJECTIVE AND OBJECTIVE BOX
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      Interval Events:  - POD 0 s/p segment 6 hepatectomy and cholecystectomy; intra op required phenylephrine and Norepinephrine.  - EBL: 100mL, IVF: 2L normosol, 500mL 5% albumin, UOP: 325mL   - Post op transferred to SICU for hemodynamic monitoring; extubated, off pressors   - Feeling well, pain well controlled, tolerating clears    Potential Discharge date: pending clinical improvement   Education:  Medications  Plan of care:  See Below    MEDICATIONS  (STANDING):  ceFAZolin   IVPB 2000 milliGRAM(s) IV Intermittent once  chlorhexidine 2% Cloths 1 Application(s) Topical daily  multiple electrolytes Injection Type 1 1000 milliLiter(s) (125 mL/Hr) IV Continuous <Continuous>  pantoprazole    Tablet 40 milliGRAM(s) Oral before breakfast  polyethylene glycol 3350 17 Gram(s) Oral daily  senna 2 Tablet(s) Oral at bedtime    MEDICATIONS  (PRN):  oxyCODONE    IR 5 milliGRAM(s) Oral every 4 hours PRN Moderate Pain (4 - 6)  oxyCODONE    IR 10 milliGRAM(s) Oral every 4 hours PRN Severe Pain (7 - 10)      PAST MEDICAL & SURGICAL HISTORY:  Hypertension  Liver disease  H/O esophageal varices  non-bleeding, on Carvedilol  HBV (hepatitis B virus) infection on tenofovir  HCC (hepatocellular carcinoma)  H/O sinus bradycardia  Nonalcoholic fatty liver disease without nonalcoholic steatohepatitis (LASSITER)  Language barrier speaks Maltese  Pre-diabetes  S/P endoscopy  S/P colonoscopy    Vital Signs Last 24 Hrs  T(C): 36.2 (28 Dec 2021 15:00), Max: 36.5 (28 Dec 2021 14:00)  T(F): 97.2 (28 Dec 2021 15:00), Max: 97.7 (28 Dec 2021 14:00)  HR: 79 (28 Dec 2021 18:00) (55 - 79)  BP: 117/78 (28 Dec 2021 14:00) (117/78 - 141/93)  BP(mean): 93 (28 Dec 2021 14:00) (93 - 93)  RR: 20 (28 Dec 2021 18:00) (17 - 20)  SpO2: 97% (28 Dec 2021 18:00) (95% - 98%)    I&O's Summary    28 Dec 2021 07:01  -  28 Dec 2021 18:28  --------------------------------------------------------  IN: 750 mL / OUT: 385 mL / NET: 365 mL                          12.3   6.83  )-----------( 94       ( 28 Dec 2021 14:17 )             37.2     12-28    139  |  104  |  18  ----------------------------<  140<H>  3.9   |  20<L>  |  0.93    Ca    9.0      28 Dec 2021 14:17  Phos  2.8     12-28  Mg     2.0     12-28    TPro  5.8<L>  /  Alb  4.0  /  TBili  1.0  /  DBili  0.3  /  AST  156<H>  /  ALT  144<H>  /  AlkPhos  47  12-28    Review of systems  Gen: No weight changes, fatigue, fevers/chills, weakness  Skin: No rashes  Head/Eyes/Ears/Mouth: No headache; Normal hearing; Normal vision w/o blurriness; No sinus pain/discomfort, sore throat  Respiratory: No dyspnea, cough, wheezing, hemoptysis  CV: No chest pain, PND, orthopnea  GI: denies diarrhea, constipation, nausea, vomiting, melena, hematochezia  : No increased frequency, dysuria, hematuria, nocturia  MSK: No joint pain/swelling; no back pain; no edema  Neuro: No dizziness/lightheadedness, weakness, seizures, numbness, tingling  Heme: No easy bruising or bleeding  Endo: No heat/cold intolerance  Psych: No significant nervousness, anxiety, stress, depression  All other systems were reviewed and are negative, except as noted.      PHYSICAL EXAM:  Constitutional: Well developed / well nourished  Eyes: Anicteric, PERRLA  ENMT: nc/at  Neck: supple  Respiratory: CTA B/L  Cardiovascular: RRR  Gastrointestinal: Soft, non distended, mild tenderness at the incision site; dressing c/d, ALISA with SS drainage    Genitourinary: Urinary catheter in place  Extremities: SCD's in place and working bilaterally, AVF.....  Vascular: Palpable dp pulses bilaterally  Neurological: A&O x3  Skin: no rashes, ulcerations or lesions;  Musculoskeletal: Moving all extremities  Psychiatric: Responsive

## 2021-12-28 NOTE — PATIENT PROFILE ADULT - FALL HARM RISK - HARM RISK INTERVENTIONS

## 2021-12-28 NOTE — CONSULT NOTE ADULT - SUBJECTIVE AND OBJECTIVE BOX
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  Patient required Phenylephrine and Norepinephrine during case.  Patient received in SICU extubated, no longer requiring vasopressor support.  OR time: 5.5hrs, EBL: 100mL, IVF: 2L normosol, 500mL 5% albumin, UOP: 325mL     PAST MEDICAL HISTORY:   HBV (hepatitis B virus) infection  HCC (hepatocellular carcinoma)  Hypertension  H/O esophageal varices on carvedilol   H/O sinus bradycardia  Nonalcoholic fatty liver disease without nonalcoholic steatohepatitis (LASSITER)  Pre-diabetes      PAST SURGICAL HISTORY:  S/P endoscopy  S/P colonoscopy      FAMILY HISTORY: No pertinent family history in first degree relatives    SOCIAL HISTORY: Lives at home with wife     CODE STATUS: Full code    HOME MEDICATIONS:    ALLERGIES: No Known Allergies      VITAL SIGNS:  ICU Vital Signs Last 24 Hrs  T(C): 36.4 (28 Dec 2021 07:04), Max: 36.4 (28 Dec 2021 07:04)  T(F): 97.5 (28 Dec 2021 07:04), Max: 97.5 (28 Dec 2021 07:04)  HR: 55 (28 Dec 2021 07:04) (55 - 55)  BP: 141/93 (28 Dec 2021 07:04) (141/93 - 141/93)  RR: 18 (28 Dec 2021 07:04) (18 - 18)  SpO2: 95% (28 Dec 2021 07:04) (95% - 95%)      NEURO  Exam: Awake, alert, in NAD.  A&O x 3.  Pleasant.   oxyCODONE    IR 5 milliGRAM(s) Oral every 4 hours PRN Moderate Pain (4 - 6)  oxyCODONE    IR 10 milliGRAM(s) Oral every 4 hours PRN Severe Pain (7 - 10)      RESPIRATORY  Mechanical Ventilation: N/A  ABG - ( 28 Dec 2021 14:07 )  pH: 7.39  /  pCO2: 39    /  pO2: 104   / HCO3: 24    / Base Excess: -1.2  /  SaO2: 98.7    Lactate: x     Exam: CTA B/L      CARDIOVASCULAR  Exam: RRR. +S1, +S2.   Cardiac Rhythm: Sinus       GI/NUTRITION  Exam: +chevron incision clean, dry, intact. +ALISA with minimal sanguinous drainage.    Diet: Clears   pantoprazole    Tablet 40 milliGRAM(s) Oral before breakfast  polyethylene glycol 3350 17 Gram(s) Oral daily  senna 2 Tablet(s) Oral at bedtime      GENITOURINARY/RENAL  multiple electrolytes Injection Type 1 1000 milliLiter(s) IV Continuous <Continuous>      [x ] Romano catheter, indication: urine output monitoring in critically ill patient    HEMATOLOGIC  [x ] VTE Prophylaxis: SCDs      Transfusion: [ ] PRBC	[ ] Platelets	[ ] FFP	[ ] Cryoprecipitate      INFECTIOUS DISEASES  ceFAZolin   IVPB 2000 milliGRAM(s) IV Intermittent once    RECENT CULTURES:      ENDOCRINE    CAPILLARY BLOOD GLUCOSE      POCT Blood Glucose.: 106 mg/dL (28 Dec 2021 06:54)      PATIENT CARE ACCESS DEVICES:  [ x] Peripheral IV  [ ] Central Venous Line	[ ] R	[ ] L	[ ] IJ	[ ] Fem	[ ] SC	Placed:   [x ] Arterial Line		[ ] R	[x ] L	[ ] Fem	[x ] Rad	[ ] Ax	Placed: 12/28  [ ] PICC:					[ ] Mediport  [x ] Urinary Catheter, Date Placed: 12/28  [x] Necessity of urinary, arterial, and venous catheters discussed    OTHER MEDICATIONS: chlorhexidine 2% Cloths 1 Application(s) Topical daily      IMAGING STUDIES:

## 2021-12-28 NOTE — PROGRESS NOTE ADULT - ASSESSMENT
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     - Diet: advance as tolerated, cont IVF  - pain control  - strict I &Os   - resume tenofovir  - continue care per SICU team

## 2021-12-29 LAB
ALBUMIN SERPL ELPH-MCNC: 3.7 G/DL — SIGNIFICANT CHANGE UP (ref 3.3–5)
ALP SERPL-CCNC: 44 U/L — SIGNIFICANT CHANGE UP (ref 40–120)
ALT FLD-CCNC: 183 U/L — HIGH (ref 10–45)
ANION GAP SERPL CALC-SCNC: 12 MMOL/L — SIGNIFICANT CHANGE UP (ref 5–17)
ANION GAP SERPL CALC-SCNC: 13 MMOL/L — SIGNIFICANT CHANGE UP (ref 5–17)
ANION GAP SERPL CALC-SCNC: 14 MMOL/L — SIGNIFICANT CHANGE UP (ref 5–17)
APTT BLD: 28.9 SEC — SIGNIFICANT CHANGE UP (ref 27.5–35.5)
AST SERPL-CCNC: 184 U/L — HIGH (ref 10–40)
BILIRUB SERPL-MCNC: 1.1 MG/DL — SIGNIFICANT CHANGE UP (ref 0.2–1.2)
BUN SERPL-MCNC: 14 MG/DL — SIGNIFICANT CHANGE UP (ref 7–23)
BUN SERPL-MCNC: 15 MG/DL — SIGNIFICANT CHANGE UP (ref 7–23)
BUN SERPL-MCNC: 16 MG/DL — SIGNIFICANT CHANGE UP (ref 7–23)
CALCIUM SERPL-MCNC: 8.5 MG/DL — SIGNIFICANT CHANGE UP (ref 8.4–10.5)
CALCIUM SERPL-MCNC: 8.5 MG/DL — SIGNIFICANT CHANGE UP (ref 8.4–10.5)
CALCIUM SERPL-MCNC: 8.6 MG/DL — SIGNIFICANT CHANGE UP (ref 8.4–10.5)
CHLORIDE SERPL-SCNC: 100 MMOL/L — SIGNIFICANT CHANGE UP (ref 96–108)
CHLORIDE SERPL-SCNC: 100 MMOL/L — SIGNIFICANT CHANGE UP (ref 96–108)
CHLORIDE SERPL-SCNC: 104 MMOL/L — SIGNIFICANT CHANGE UP (ref 96–108)
CO2 SERPL-SCNC: 18 MMOL/L — LOW (ref 22–31)
CO2 SERPL-SCNC: 20 MMOL/L — LOW (ref 22–31)
CO2 SERPL-SCNC: 21 MMOL/L — LOW (ref 22–31)
CREAT SERPL-MCNC: 0.84 MG/DL — SIGNIFICANT CHANGE UP (ref 0.5–1.3)
CREAT SERPL-MCNC: 0.84 MG/DL — SIGNIFICANT CHANGE UP (ref 0.5–1.3)
CREAT SERPL-MCNC: 0.93 MG/DL — SIGNIFICANT CHANGE UP (ref 0.5–1.3)
GAS PNL BLDA: SIGNIFICANT CHANGE UP
GLUCOSE SERPL-MCNC: 155 MG/DL — HIGH (ref 70–99)
GLUCOSE SERPL-MCNC: 159 MG/DL — HIGH (ref 70–99)
GLUCOSE SERPL-MCNC: 174 MG/DL — HIGH (ref 70–99)
HCT VFR BLD CALC: 34.9 % — LOW (ref 39–50)
HCT VFR BLD CALC: 35.8 % — LOW (ref 39–50)
HCT VFR BLD CALC: 38.7 % — LOW (ref 39–50)
HGB BLD-MCNC: 11.9 G/DL — LOW (ref 13–17)
HGB BLD-MCNC: 11.9 G/DL — LOW (ref 13–17)
HGB BLD-MCNC: 12.8 G/DL — LOW (ref 13–17)
INR BLD: 1.35 RATIO — HIGH (ref 0.88–1.16)
MAGNESIUM SERPL-MCNC: 1.9 MG/DL — SIGNIFICANT CHANGE UP (ref 1.6–2.6)
MAGNESIUM SERPL-MCNC: 2 MG/DL — SIGNIFICANT CHANGE UP (ref 1.6–2.6)
MAGNESIUM SERPL-MCNC: 2.1 MG/DL — SIGNIFICANT CHANGE UP (ref 1.6–2.6)
MCHC RBC-ENTMCNC: 28.7 PG — SIGNIFICANT CHANGE UP (ref 27–34)
MCHC RBC-ENTMCNC: 28.7 PG — SIGNIFICANT CHANGE UP (ref 27–34)
MCHC RBC-ENTMCNC: 29.3 PG — SIGNIFICANT CHANGE UP (ref 27–34)
MCHC RBC-ENTMCNC: 33.1 GM/DL — SIGNIFICANT CHANGE UP (ref 32–36)
MCHC RBC-ENTMCNC: 33.2 GM/DL — SIGNIFICANT CHANGE UP (ref 32–36)
MCHC RBC-ENTMCNC: 34.1 GM/DL — SIGNIFICANT CHANGE UP (ref 32–36)
MCV RBC AUTO: 86 FL — SIGNIFICANT CHANGE UP (ref 80–100)
MCV RBC AUTO: 86.5 FL — SIGNIFICANT CHANGE UP (ref 80–100)
MCV RBC AUTO: 86.8 FL — SIGNIFICANT CHANGE UP (ref 80–100)
NRBC # BLD: 0 /100 WBCS — SIGNIFICANT CHANGE UP (ref 0–0)
PHOSPHATE SERPL-MCNC: 1.8 MG/DL — LOW (ref 2.5–4.5)
PHOSPHATE SERPL-MCNC: 3 MG/DL — SIGNIFICANT CHANGE UP (ref 2.5–4.5)
PHOSPHATE SERPL-MCNC: 3.2 MG/DL — SIGNIFICANT CHANGE UP (ref 2.5–4.5)
PLATELET # BLD AUTO: 100 K/UL — LOW (ref 150–400)
PLATELET # BLD AUTO: 109 K/UL — LOW (ref 150–400)
PLATELET # BLD AUTO: 130 K/UL — LOW (ref 150–400)
POTASSIUM SERPL-MCNC: 4.2 MMOL/L — SIGNIFICANT CHANGE UP (ref 3.5–5.3)
POTASSIUM SERPL-MCNC: 4.4 MMOL/L — SIGNIFICANT CHANGE UP (ref 3.5–5.3)
POTASSIUM SERPL-MCNC: 4.6 MMOL/L — SIGNIFICANT CHANGE UP (ref 3.5–5.3)
POTASSIUM SERPL-SCNC: 4.2 MMOL/L — SIGNIFICANT CHANGE UP (ref 3.5–5.3)
POTASSIUM SERPL-SCNC: 4.4 MMOL/L — SIGNIFICANT CHANGE UP (ref 3.5–5.3)
POTASSIUM SERPL-SCNC: 4.6 MMOL/L — SIGNIFICANT CHANGE UP (ref 3.5–5.3)
PROT SERPL-MCNC: 5.6 G/DL — LOW (ref 6–8.3)
PROTHROM AB SERPL-ACNC: 16 SEC — HIGH (ref 10.6–13.6)
RBC # BLD: 4.06 M/UL — LOW (ref 4.2–5.8)
RBC # BLD: 4.14 M/UL — LOW (ref 4.2–5.8)
RBC # BLD: 4.46 M/UL — SIGNIFICANT CHANGE UP (ref 4.2–5.8)
RBC # FLD: 13.6 % — SIGNIFICANT CHANGE UP (ref 10.3–14.5)
RBC # FLD: 13.7 % — SIGNIFICANT CHANGE UP (ref 10.3–14.5)
RBC # FLD: 13.7 % — SIGNIFICANT CHANGE UP (ref 10.3–14.5)
SODIUM SERPL-SCNC: 132 MMOL/L — LOW (ref 135–145)
SODIUM SERPL-SCNC: 134 MMOL/L — LOW (ref 135–145)
SODIUM SERPL-SCNC: 136 MMOL/L — SIGNIFICANT CHANGE UP (ref 135–145)
WBC # BLD: 10.35 K/UL — SIGNIFICANT CHANGE UP (ref 3.8–10.5)
WBC # BLD: 8 K/UL — SIGNIFICANT CHANGE UP (ref 3.8–10.5)
WBC # BLD: 8.73 K/UL — SIGNIFICANT CHANGE UP (ref 3.8–10.5)
WBC # FLD AUTO: 10.35 K/UL — SIGNIFICANT CHANGE UP (ref 3.8–10.5)
WBC # FLD AUTO: 8 K/UL — SIGNIFICANT CHANGE UP (ref 3.8–10.5)
WBC # FLD AUTO: 8.73 K/UL — SIGNIFICANT CHANGE UP (ref 3.8–10.5)

## 2021-12-29 PROCEDURE — 71045 X-RAY EXAM CHEST 1 VIEW: CPT | Mod: 26

## 2021-12-29 PROCEDURE — 99233 SBSQ HOSP IP/OBS HIGH 50: CPT

## 2021-12-29 RX ORDER — HYDROMORPHONE HYDROCHLORIDE 2 MG/ML
0.5 INJECTION INTRAMUSCULAR; INTRAVENOUS; SUBCUTANEOUS ONCE
Refills: 0 | Status: DISCONTINUED | OUTPATIENT
Start: 2021-12-29 | End: 2021-12-29

## 2021-12-29 RX ORDER — SODIUM CHLORIDE 9 MG/ML
1000 INJECTION, SOLUTION INTRAVENOUS
Refills: 0 | Status: DISCONTINUED | OUTPATIENT
Start: 2021-12-29 | End: 2021-12-29

## 2021-12-29 RX ORDER — CALCIUM GLUCONATE 100 MG/ML
2 VIAL (ML) INTRAVENOUS ONCE
Refills: 0 | Status: COMPLETED | OUTPATIENT
Start: 2021-12-29 | End: 2021-12-29

## 2021-12-29 RX ORDER — ACETAMINOPHEN 500 MG
975 TABLET ORAL EVERY 6 HOURS
Refills: 0 | Status: DISCONTINUED | OUTPATIENT
Start: 2021-12-29 | End: 2021-12-31

## 2021-12-29 RX ORDER — TENOFOVIR DISOPROXIL FUMARATE 300 MG/1
300 TABLET, FILM COATED ORAL DAILY
Refills: 0 | Status: DISCONTINUED | OUTPATIENT
Start: 2021-12-29 | End: 2021-12-31

## 2021-12-29 RX ORDER — TRAMADOL HYDROCHLORIDE 50 MG/1
25 TABLET ORAL EVERY 4 HOURS
Refills: 0 | Status: DISCONTINUED | OUTPATIENT
Start: 2021-12-29 | End: 2021-12-31

## 2021-12-29 RX ORDER — SODIUM CHLORIDE 9 MG/ML
500 INJECTION, SOLUTION INTRAVENOUS ONCE
Refills: 0 | Status: COMPLETED | OUTPATIENT
Start: 2021-12-29 | End: 2021-12-29

## 2021-12-29 RX ORDER — TRAMADOL HYDROCHLORIDE 50 MG/1
50 TABLET ORAL EVERY 4 HOURS
Refills: 0 | Status: DISCONTINUED | OUTPATIENT
Start: 2021-12-29 | End: 2021-12-31

## 2021-12-29 RX ORDER — POTASSIUM PHOSPHATE, MONOBASIC POTASSIUM PHOSPHATE, DIBASIC 236; 224 MG/ML; MG/ML
30 INJECTION, SOLUTION INTRAVENOUS ONCE
Refills: 0 | Status: COMPLETED | OUTPATIENT
Start: 2021-12-29 | End: 2021-12-29

## 2021-12-29 RX ORDER — POTASSIUM PHOSPHATE, MONOBASIC POTASSIUM PHOSPHATE, DIBASIC 236; 224 MG/ML; MG/ML
15 INJECTION, SOLUTION INTRAVENOUS ONCE
Refills: 0 | Status: COMPLETED | OUTPATIENT
Start: 2021-12-29 | End: 2021-12-29

## 2021-12-29 RX ADMIN — OXYCODONE HYDROCHLORIDE 5 MILLIGRAM(S): 5 TABLET ORAL at 01:39

## 2021-12-29 RX ADMIN — POTASSIUM PHOSPHATE, MONOBASIC POTASSIUM PHOSPHATE, DIBASIC 62.5 MILLIMOLE(S): 236; 224 INJECTION, SOLUTION INTRAVENOUS at 05:54

## 2021-12-29 RX ADMIN — SENNA PLUS 2 TABLET(S): 8.6 TABLET ORAL at 21:03

## 2021-12-29 RX ADMIN — POTASSIUM PHOSPHATE, MONOBASIC POTASSIUM PHOSPHATE, DIBASIC 83.33 MILLIMOLE(S): 236; 224 INJECTION, SOLUTION INTRAVENOUS at 19:30

## 2021-12-29 RX ADMIN — POLYETHYLENE GLYCOL 3350 17 GRAM(S): 17 POWDER, FOR SOLUTION ORAL at 11:23

## 2021-12-29 RX ADMIN — OXYCODONE HYDROCHLORIDE 5 MILLIGRAM(S): 5 TABLET ORAL at 00:39

## 2021-12-29 RX ADMIN — TRAMADOL HYDROCHLORIDE 50 MILLIGRAM(S): 50 TABLET ORAL at 13:38

## 2021-12-29 RX ADMIN — Medication 200 GRAM(S): at 05:06

## 2021-12-29 RX ADMIN — TRAMADOL HYDROCHLORIDE 50 MILLIGRAM(S): 50 TABLET ORAL at 13:08

## 2021-12-29 RX ADMIN — TENOFOVIR DISOPROXIL FUMARATE 300 MILLIGRAM(S): 300 TABLET, FILM COATED ORAL at 11:23

## 2021-12-29 RX ADMIN — HYDROMORPHONE HYDROCHLORIDE 0.5 MILLIGRAM(S): 2 INJECTION INTRAMUSCULAR; INTRAVENOUS; SUBCUTANEOUS at 21:16

## 2021-12-29 RX ADMIN — OXYCODONE HYDROCHLORIDE 10 MILLIGRAM(S): 5 TABLET ORAL at 05:55

## 2021-12-29 RX ADMIN — CHLORHEXIDINE GLUCONATE 1 APPLICATION(S): 213 SOLUTION TOPICAL at 11:21

## 2021-12-29 RX ADMIN — TRAMADOL HYDROCHLORIDE 50 MILLIGRAM(S): 50 TABLET ORAL at 17:33

## 2021-12-29 RX ADMIN — SODIUM CHLORIDE 1000 MILLILITER(S): 9 INJECTION, SOLUTION INTRAVENOUS at 13:18

## 2021-12-29 RX ADMIN — TRAMADOL HYDROCHLORIDE 25 MILLIGRAM(S): 50 TABLET ORAL at 14:09

## 2021-12-29 RX ADMIN — PANTOPRAZOLE SODIUM 40 MILLIGRAM(S): 20 TABLET, DELAYED RELEASE ORAL at 07:13

## 2021-12-29 RX ADMIN — HYDROMORPHONE HYDROCHLORIDE 0.5 MILLIGRAM(S): 2 INJECTION INTRAMUSCULAR; INTRAVENOUS; SUBCUTANEOUS at 20:46

## 2021-12-29 RX ADMIN — TRAMADOL HYDROCHLORIDE 25 MILLIGRAM(S): 50 TABLET ORAL at 14:39

## 2021-12-29 RX ADMIN — Medication 975 MILLIGRAM(S): at 17:33

## 2021-12-29 NOTE — PROGRESS NOTE ADULT - SUBJECTIVE AND OBJECTIVE BOX
Day 1 of Anesthesia Pain Management Service    SUBJECTIVE:  Pain Scale Score:          [X] Refer to charted pain scores    THERAPY:    s/p neuraxial PF morphine    MEDICATIONS  (STANDING):  ceFAZolin   IVPB 2000 milliGRAM(s) IV Intermittent once  chlorhexidine 2% Cloths 1 Application(s) Topical daily  multiple electrolytes Injection Type 1 1000 milliLiter(s) (125 mL/Hr) IV Continuous <Continuous>  pantoprazole    Tablet 40 milliGRAM(s) Oral before breakfast  polyethylene glycol 3350 17 Gram(s) Oral daily  senna 2 Tablet(s) Oral at bedtime    MEDICATIONS  (PRN):  nalbuphine Injectable 2.5 milliGRAM(s) IV Push every 6 hours PRN Pruritus  naloxone Injectable 0.1 milliGRAM(s) IV Push every 3 minutes PRN For ANY of the following changes in patient status:  A. RR LESS THAN 10 breaths per minute, B. Oxygen saturation LESS THAN 90%, C. Sedation score of 6  ondansetron Injectable 4 milliGRAM(s) IV Push every 6 hours PRN Nausea  oxyCODONE    IR 5 milliGRAM(s) Oral every 4 hours PRN Moderate Pain (4 - 6)  oxyCODONE    IR 10 milliGRAM(s) Oral every 4 hours PRN Severe Pain (7 - 10)      OBJECTIVE:    Sedation:        	[X] Alert	[ ] Drowsy	[ ] Arousable      [ ] Asleep       [ ] Unresponsive    Side Effects:	[X] None	[ ] Nausea	[ ] Vomiting         [ ] Pruritus  		[ ] Weakness            [ ] Numbness	          [ ] Other:    Vital Signs Last 24 Hrs  T(C): 36.9 (29 Dec 2021 07:00), Max: 36.9 (29 Dec 2021 07:00)  T(F): 98.4 (29 Dec 2021 07:00), Max: 98.4 (29 Dec 2021 07:00)  HR: 86 (29 Dec 2021 08:00) (56 - 86)  BP: 95/71 (29 Dec 2021 05:00) (90/62 - 117/78)  BP(mean): 80 (29 Dec 2021 05:00) (72 - 93)  RR: 28 (29 Dec 2021 08:00) (17 - 28)  SpO2: 94% (29 Dec 2021 08:00) (91% - 98%)    ASSESSMENT/ PLAN  [X] Patient transitioned to prn analgesics  [X] Pain management per primary service, pain service to sign off   [X]Documentation and Verification of current medications

## 2021-12-29 NOTE — PHYSICAL THERAPY INITIAL EVALUATION ADULT - GAIT DEVIATIONS NOTED, PT EVAL
decreased adama/increased time in double stance/decreased velocity of limb motion/decreased step length/decreased stride length/decreased weight-shifting ability

## 2021-12-29 NOTE — BRIEF OPERATIVE NOTE - COMMENTS
Dr. Jacome was scrubbed to provide assistance during the operation as there was no qualified resident available to assist.

## 2021-12-29 NOTE — BRIEF OPERATIVE NOTE - NSICDXBRIEFPOSTOP_GEN_ALL_CORE_FT
POST-OP DIAGNOSIS:  Liver mass 28-Dec-2021 13:59:33  Channing Alanis  
POST-OP DIAGNOSIS:  Liver mass 28-Dec-2021 13:59:33  Channing Alanis

## 2021-12-29 NOTE — PHYSICAL THERAPY INITIAL EVALUATION ADULT - PLANNED THERAPY INTERVENTIONS, PT EVAL
GOALS: Pt will negotiate 12 steps with unilateral handrail & step to pattern with independence in 4wks/bed mobility training/gait training/transfer training

## 2021-12-29 NOTE — PROGRESS NOTE ADULT - ASSESSMENT
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  Patient required Phenylephrine and Norepinephrine during case.  Patient received in SICU extubated, no longer requiring vasopressor support.     24 hour events:  - no acute events overnight    Neuro: acute post-op pain control  - s/p Duramorph injection as beginning of OR  - Oxycodone prn     Resp:  - No acute issues  - O2 prn   - Incentive spirometry     CV: h/o non-obstructive cardiomyopathy  - Hemodynamic monitoring  - Hold Carvedilol for now as patient recently required vasopressor support     GI: s/p segment 6 hepatectomy, cholecystectomy; h/o HCC, HBV, gastric varices  - Clears  - Protonix  - Miralax and Senna   - Trend LFTs     /Renal:  - Plasmalyte @ 125mL/hr  - Monitor UOP  - Replete electrolytes as needed, BMP q 6hrs     ID:  - No acute issues, monitor WBV    Heme: VTE prophylaxis  - Hold x 24 hrs as per surgery  - CBC q 6hrs  - SCDs    Endo:  - Monitor serum glucose     Lines:  - L radial A-line  - Had R IJ Cordis during OR, now removed.  f/u CXR    Dispo: Full code.  SICU care.  Case discussed with Dr. Gomez.     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  Patient required Phenylephrine and Norepinephrine during case.  Patient received in SICU extubated, no longer requiring vasopressor support.     24 hour events:  - no acute events overnight    Neuro: acute post-op pain control  - s/p Duramorph injection as beginning of OR  - Oxycodone prn     Resp:  - No acute issues  - O2 prn   - Incentive spirometry     CV: h/o non-obstructive cardiomyopathy  - Hemodynamic monitoring  - Hold Carvedilol for now as patient recently required vasopressor support     GI: s/p segment 6 hepatectomy, cholecystectomy; h/o HCC, HBV, gastric varices  - Clears  - Protonix  - Miralax and Senna   - Trend LFTs     /Renal:  - Plasmalyte @ 125mL/hr  - Monitor UOP  - Replete electrolytes as needed, BMP q 6hrs     ID:  - No acute issues, monitor WBV    Heme: VTE prophylaxis  - Hold x 24 hrs as per surgery  - CBC q 6hrs  - SCDs    Endo:  - Monitor serum glucose     Lines:  - L radial A-line  - Had R IJ Cordis during OR, now removed    Dispo: Full code.  SICU care

## 2021-12-29 NOTE — OCCUPATIONAL THERAPY INITIAL EVALUATION ADULT - LIVES WITH, PROFILE
Pt lives with spouse in [place]. Pt reports independence with self care and fxnl mobility without AD PTA.

## 2021-12-29 NOTE — OCCUPATIONAL THERAPY INITIAL EVALUATION ADULT - PERTINENT HX OF CURRENT PROBLEM, REHAB EVAL
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 Pt had pre-op clearance by cardiology - Dr. Cristino Byrd. Pt. presents to PST for scheduled Liver Resection of Segment 6 Liver Tumor.

## 2021-12-29 NOTE — PROGRESS NOTE ADULT - SUBJECTIVE AND OBJECTIVE BOX
SICU PROGRESS NOTE     PADMINI KNOX  66y  Male  Hospital day :1d    T(F): 98 (12-28-21 @ 23:00), Max: 98.2 (12-28-21 @ 19:00)  HR: 71 (12-29-21 @ 00:00) (55 - 80)  BP: 106/77 (12-28-21 @ 20:00) (106/77 - 141/93)  ABP: 88/55 (12-29-21 @ 00:00) (87/54 - 112/69)  ABP(mean): 65 (12-29-21 @ 00:00) (65 - 83)  RR: 21 (12-29-21 @ 00:00) (17 - 22)  SpO2: 93% (12-29-21 @ 00:00) (92% - 98%)    DIET/FLUIDS: multiple electrolytes Injection Type 1 1000 milliLiter(s) IV Continuous <Continuous>      URINE:    Indwelling Urethral Catheter:     Connect To:  Straight Drainage/Gravity    Indication:  Urine Output Monitoring in Critically Ill (12-28-21 @ 13:54)    GI proph:  pantoprazole    Tablet 40 milliGRAM(s) Oral before breakfast    AC/ proph:   ABx: ceFAZolin   IVPB 2000 milliGRAM(s) IV Intermittent once    PHYSICAL EXAM:  GENERAL: NAD, well-appearing  CHEST/LUNG: Clear to auscultation bilaterally  HEART: Regular rate and rhythm  ABDOMEN: soft, distended, incisions clean and dry, periincisional tenderness  EXTREMITIES:  No clubbing, cyanosis, or edema    Labs  CAPILLARY BLOOD GLUCOSE  POCT Blood Glucose.: 106 mg/dL (28 Dec 2021 06:54)                     12.8   8.66  )-----------( 106      ( 28 Dec 2021 20:09 )             38.1         12-28    138  |  103  |  18  ----------------------------<  176<H>  4.5   |  19<L>  |  1.00      Calcium, Total Serum: 8.9 mg/dL (12-28-21 @ 20:09)    LFTs:             5.8  | 1.0  | 156      ------------------[47      ( 28 Dec 2021 14:17 )  4.0  | 0.3  | 144         Lipase:x      Amylase:x         Blood Gas Arterial, Lactate: 1.6 mmol/L (12-28-21 @ 14:07)  Blood Gas Arterial, Lactate: 1.9 mmol/L (12-28-21 @ 12:45)  Blood Gas Arterial, Lactate: 1.7 mmol/L (12-28-21 @ 12:02)  Blood Gas Arterial, Lactate: 1.6 mmol/L (12-28-21 @ 09:40)    ABG - ( 28 Dec 2021 14:07 )  pH: 7.39  /  pCO2: 39    /  pO2: 104   / HCO3: 24    / Base Excess: -1.2  /  SaO2: 98.7      ABG - ( 28 Dec 2021 12:45 )  pH: 7.43  /  pCO2: 36    /  pO2: 197   / HCO3: 24    / Base Excess: -0.1  /  SaO2: 99.4     ABG - ( 28 Dec 2021 12:02 )  pH: 7.41  /  pCO2: 36    /  pO2: 203   / HCO3: 23    / Base Excess: -1.4  /  SaO2: 99.8      Coags:     14.1   ----< 1.18    ( 28 Dec 2021 14:17 )     27.0

## 2021-12-29 NOTE — PHYSICAL THERAPY INITIAL EVALUATION ADULT - PERTINENT HX OF CURRENT PROBLEM, REHAB EVAL
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 Pt had pre-op clearance by cardiology - Dr. Cristino Byrd. Pt. presents to PST for scheduled Liver Resection of Segment 6 Liver Tumor.

## 2021-12-29 NOTE — BRIEF OPERATIVE NOTE - OPERATION/FINDINGS
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
cholecystectomy   intraoperative US   segment 6 liver mass, segment 6 resection     19 Fr ALISA in liver bed    EBL: 100cc  Fluid: 2L  500cc 5% albumin  UO: 550cc

## 2021-12-29 NOTE — PROGRESS NOTE ADULT - ASSESSMENT
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 Admitted to SICU post-op for hemodynamic monitoring due to dual pressor requirement intra-op.     HCC, s/p segment 6 hepatectomy w/ cholecystectomy   - PRN pain regimen with Tylenol, Tramadol  - IVF @ 100/hr   - Adv diet to regular as tolerated  - tata Lea    HBV, Compensated Cirrhosis, Gastric Varices  - Continue home Tenofovir  - Holding home Coreg due to borderline SBP   - Daily MELD labs     HTN  - Holding home Lisinopril due to borderline SBP    Chronic SMA Dissection   - Holding home Plavix in bernadine-op setting    SCDs, holding SQH  PPI  CLD  OOBTC, ambulate as tolerated  Transfer to floor/tele from SICU       - Diet: advance as tolerated, cont IVF  - pain control  - strict I &Os   - resume tenofovir  - continue care per SICU team

## 2021-12-29 NOTE — PHYSICAL THERAPY INITIAL EVALUATION ADULT - PRECAUTIONS/LIMITATIONS, REHAB EVAL
CT CHEST: No evidence for intrathoracic metastasis. Sub-4 mm left upper lobe nodule is indeterminate and can be reassessed on surveillance imaging. CT CHEST: No evidence for intrathoracic metastasis. Sub-4 mm left upper lobe nodule is indeterminate and can be reassessed on surveillance imaging./no known precautions/limitations

## 2021-12-29 NOTE — OCCUPATIONAL THERAPY INITIAL EVALUATION ADULT - PRECAUTIONS/LIMITATIONS, REHAB EVAL
CT CHEST: No evidence for intrathoracic metastasis. Sub-4 mm left upper lobe nodule is indeterminate and can be reassessed on surveillance imaging. s/p open cholecystectomy; Intraoperative ultrasound; Partial hepatectomy CT CHEST: No evidence for intrathoracic metastasis. Sub-4 mm left upper lobe nodule is indeterminate and can be reassessed on surveillance imaging. s/p open cholecystectomy; Intraoperative ultrasound; Partial hepatectomy/fall precautions

## 2021-12-29 NOTE — PHYSICAL THERAPY INITIAL EVALUATION ADULT - GENERAL OBSERVATIONS, REHAB EVAL
received OOB sitting in chair, A&OX4, following commands, pleasant & eager to participate, Ismael speaking with  #[id number] used t/o, s/p open cholecystectomy, partial hepatectomy(12/28), +Radha, +Romano, +ICU monitoring.

## 2021-12-29 NOTE — PROGRESS NOTE ADULT - SUBJECTIVE AND OBJECTIVE BOX
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      Interval Events:  - Seen out of bed in chair this AM, reports he is feeling well, excited to eat breakfast  - Blood pressure with SBP 100s thorughout evening, no interventions required  - Pain well controlled    Potential Discharge date: pending clinical improvement   Education:  Medications  Plan of care:  See Below    MEDICATIONS  (STANDING):  ceFAZolin   IVPB 2000 milliGRAM(s) IV Intermittent once  chlorhexidine 2% Cloths 1 Application(s) Topical daily  dextrose 5% + lactated ringers. 1000 milliLiter(s) (100 mL/Hr) IV Continuous <Continuous>  pantoprazole    Tablet 40 milliGRAM(s) Oral before breakfast  polyethylene glycol 3350 17 Gram(s) Oral daily  senna 2 Tablet(s) Oral at bedtime      MEDICATIONS  (PRN):  naloxone Injectable 0.1 milliGRAM(s) IV Push every 3 minutes PRN For ANY of the following changes in patient status:  A. RR LESS THAN 10 breaths per minute, B. Oxygen saturation LESS THAN 90%, C. Sedation score of 6  ondansetron Injectable 4 milliGRAM(s) IV Push every 6 hours PRN Nausea  traMADol 25 milliGRAM(s) Oral every 4 hours PRN Moderate Pain (4 - 6)  traMADol 50 milliGRAM(s) Oral every 4 hours PRN Severe Pain (7 - 10)      PAST MEDICAL & SURGICAL HISTORY:  Hypertension  Liver disease  H/O esophageal varices  non-bleeding, on Carvedilol  HBV (hepatitis B virus) infection on tenofovir  HCC (hepatocellular carcinoma)  H/O sinus bradycardia  Nonalcoholic fatty liver disease without nonalcoholic steatohepatitis (LASSITER)  Language barrier speaks Icelandic  Pre-diabetes  S/P endoscopy  S/P colonoscopy    Vital Signs Last 24 Hrs  ICU Vital Signs Last 24 Hrs  T(C): 36.9 (29 Dec 2021 07:00), Max: 36.9 (29 Dec 2021 07:00)  T(F): 98.4 (29 Dec 2021 07:00), Max: 98.4 (29 Dec 2021 07:00)  HR: 88 (29 Dec 2021 09:18) (56 - 91)  BP: 95/71 (29 Dec 2021 05:00) (90/62 - 117/78)  BP(mean): 80 (29 Dec 2021 05:00) (72 - 93)  ABP: 110/71 (29 Dec 2021 09:18) (87/54 - 112/69)  ABP(mean): 82 (29 Dec 2021 09:18) (64 - 85)  RR: 19 (29 Dec 2021 09:18) (17 - 28)  SpO2: 92% (29 Dec 2021 09:18) (91% - 98%)      I&O's Summary  I&O's Detail    28 Dec 2021 07:01  -  29 Dec 2021 07:00  --------------------------------------------------------  IN:    IV PiggyBack: 412.5 mL    multiple electrolytes Injection Type 1.: 2125 mL    Oral Fluid: 240 mL  Total IN: 2777.5 mL    OUT:    Bulb (mL): 170 mL    Indwelling Catheter - Urethral (mL): 1420 mL  Total OUT: 1590 mL    Total NET: 1187.5 mL      29 Dec 2021 07:01  -  29 Dec 2021 09:51  --------------------------------------------------------  IN:    IV PiggyBack: 62.5 mL    multiple electrolytes Injection Type 1.: 125 mL  Total IN: 187.5 mL    OUT:  Total OUT: 0 mL    Total NET: 187.5 mL                        11.9   8.00  )-----------( 109      ( 29 Dec 2021 02:02 )             35.8     12-29    136  |  104  |  16  ----------------------------<  155<H>  4.4   |  20<L>  |  0.93    Ca    8.6      29 Dec 2021 02:02  Phos  3.2     12-29  Mg     2.1     12-29    TPro  5.6<L>  /  Alb  3.7  /  TBili  1.1  /  DBili  x   /  AST  184<H>  /  ALT  183<H>  /  AlkPhos  44  12-29    PT/INR - ( 29 Dec 2021 02:02 )   PT: 16.0 sec;   INR: 1.35 ratio   PTT - ( 29 Dec 2021 02:02 )  PTT:28.9 sec    COVID-19 PCR: NotDetec (26 Dec 2021 16:35)      Review of systems  Gen: No weight changes, fatigue, fevers/chills, weakness  Skin: No rashes  Head/Eyes/Ears/Mouth: No headache; Normal hearing; Normal vision w/o blurriness; No sinus pain/discomfort, sore throat  Respiratory: No dyspnea, cough, wheezing, hemoptysis  CV: No chest pain, PND, orthopnea  GI: denies diarrhea, constipation, nausea, vomiting, melena, hematochezia  : No increased frequency, dysuria, hematuria, nocturia  MSK: No joint pain/swelling; no back pain; no edema  Neuro: No dizziness/lightheadedness, weakness, seizures, numbness, tingling  Heme: No easy bruising or bleeding  Endo: No heat/cold intolerance  Psych: No significant nervousness, anxiety, stress, depression  All other systems were reviewed and are negative, except as noted.      PHYSICAL EXAM:  Constitutional: Well developed / well nourished  Eyes: Anicteric, PERRLA  ENMT: nc/at  Neck: supple  Respiratory: CTA B/L  Cardiovascular: RRR  Gastrointestinal: Soft, non distended, mild tenderness at the incision site; dressing c/d, ALISA with SS drainage    Genitourinary: Urinary catheter in place  Extremities: SCD's in place and working bilaterally  Vascular: Palpable dp pulses bilaterally  Neurological: A&O x3  Skin: no rashes, ulcerations or lesions;  Musculoskeletal: Moving all extremities  Psychiatric: Responsive

## 2021-12-29 NOTE — PHYSICAL THERAPY INITIAL EVALUATION ADULT - ADDITIONAL COMMENTS
Pt resides in apt with spouse, 0 steps to enter, one level within. PTA independent with mobility and ADL's, owns no DME, (+)driving.

## 2021-12-30 LAB
ALBUMIN SERPL ELPH-MCNC: 3.6 G/DL — SIGNIFICANT CHANGE UP (ref 3.3–5)
ALP SERPL-CCNC: 50 U/L — SIGNIFICANT CHANGE UP (ref 40–120)
ALT FLD-CCNC: 210 U/L — HIGH (ref 10–45)
ANION GAP SERPL CALC-SCNC: 9 MMOL/L — SIGNIFICANT CHANGE UP (ref 5–17)
AST SERPL-CCNC: 139 U/L — HIGH (ref 10–40)
BILIRUB SERPL-MCNC: 1.8 MG/DL — HIGH (ref 0.2–1.2)
BUN SERPL-MCNC: 13 MG/DL — SIGNIFICANT CHANGE UP (ref 7–23)
CALCIUM SERPL-MCNC: 8.6 MG/DL — SIGNIFICANT CHANGE UP (ref 8.4–10.5)
CHLORIDE SERPL-SCNC: 97 MMOL/L — SIGNIFICANT CHANGE UP (ref 96–108)
CO2 SERPL-SCNC: 27 MMOL/L — SIGNIFICANT CHANGE UP (ref 22–31)
CREAT SERPL-MCNC: 0.94 MG/DL — SIGNIFICANT CHANGE UP (ref 0.5–1.3)
GLUCOSE SERPL-MCNC: 132 MG/DL — HIGH (ref 70–99)
HCT VFR BLD CALC: 38.5 % — LOW (ref 39–50)
HGB BLD-MCNC: 12.7 G/DL — LOW (ref 13–17)
MAGNESIUM SERPL-MCNC: 2 MG/DL — SIGNIFICANT CHANGE UP (ref 1.6–2.6)
MCHC RBC-ENTMCNC: 28.9 PG — SIGNIFICANT CHANGE UP (ref 27–34)
MCHC RBC-ENTMCNC: 33 GM/DL — SIGNIFICANT CHANGE UP (ref 32–36)
MCV RBC AUTO: 87.5 FL — SIGNIFICANT CHANGE UP (ref 80–100)
NRBC # BLD: 0 /100 WBCS — SIGNIFICANT CHANGE UP (ref 0–0)
PHOSPHATE SERPL-MCNC: 2.5 MG/DL — SIGNIFICANT CHANGE UP (ref 2.5–4.5)
PLATELET # BLD AUTO: 107 K/UL — LOW (ref 150–400)
POTASSIUM SERPL-MCNC: 4.3 MMOL/L — SIGNIFICANT CHANGE UP (ref 3.5–5.3)
POTASSIUM SERPL-SCNC: 4.3 MMOL/L — SIGNIFICANT CHANGE UP (ref 3.5–5.3)
PROT SERPL-MCNC: 6 G/DL — SIGNIFICANT CHANGE UP (ref 6–8.3)
RBC # BLD: 4.4 M/UL — SIGNIFICANT CHANGE UP (ref 4.2–5.8)
RBC # FLD: 13.7 % — SIGNIFICANT CHANGE UP (ref 10.3–14.5)
SODIUM SERPL-SCNC: 133 MMOL/L — LOW (ref 135–145)
WBC # BLD: 9.3 K/UL — SIGNIFICANT CHANGE UP (ref 3.8–10.5)
WBC # FLD AUTO: 9.3 K/UL — SIGNIFICANT CHANGE UP (ref 3.8–10.5)

## 2021-12-30 RX ORDER — HEPARIN SODIUM 5000 [USP'U]/ML
5000 INJECTION INTRAVENOUS; SUBCUTANEOUS EVERY 8 HOURS
Refills: 0 | Status: DISCONTINUED | OUTPATIENT
Start: 2021-12-30 | End: 2021-12-31

## 2021-12-30 RX ORDER — SODIUM,POTASSIUM PHOSPHATES 278-250MG
1 POWDER IN PACKET (EA) ORAL
Refills: 0 | Status: COMPLETED | OUTPATIENT
Start: 2021-12-30 | End: 2021-12-30

## 2021-12-30 RX ADMIN — PANTOPRAZOLE SODIUM 40 MILLIGRAM(S): 20 TABLET, DELAYED RELEASE ORAL at 06:29

## 2021-12-30 RX ADMIN — Medication 1 PACKET(S): at 12:30

## 2021-12-30 RX ADMIN — TRAMADOL HYDROCHLORIDE 50 MILLIGRAM(S): 50 TABLET ORAL at 09:40

## 2021-12-30 RX ADMIN — TRAMADOL HYDROCHLORIDE 25 MILLIGRAM(S): 50 TABLET ORAL at 02:18

## 2021-12-30 RX ADMIN — TRAMADOL HYDROCHLORIDE 50 MILLIGRAM(S): 50 TABLET ORAL at 17:41

## 2021-12-30 RX ADMIN — TENOFOVIR DISOPROXIL FUMARATE 300 MILLIGRAM(S): 300 TABLET, FILM COATED ORAL at 11:46

## 2021-12-30 RX ADMIN — TRAMADOL HYDROCHLORIDE 50 MILLIGRAM(S): 50 TABLET ORAL at 21:34

## 2021-12-30 RX ADMIN — HEPARIN SODIUM 5000 UNIT(S): 5000 INJECTION INTRAVENOUS; SUBCUTANEOUS at 13:57

## 2021-12-30 RX ADMIN — TRAMADOL HYDROCHLORIDE 50 MILLIGRAM(S): 50 TABLET ORAL at 09:03

## 2021-12-30 RX ADMIN — TRAMADOL HYDROCHLORIDE 50 MILLIGRAM(S): 50 TABLET ORAL at 22:00

## 2021-12-30 RX ADMIN — HEPARIN SODIUM 5000 UNIT(S): 5000 INJECTION INTRAVENOUS; SUBCUTANEOUS at 21:33

## 2021-12-30 RX ADMIN — TRAMADOL HYDROCHLORIDE 25 MILLIGRAM(S): 50 TABLET ORAL at 02:48

## 2021-12-30 RX ADMIN — Medication 1 PACKET(S): at 11:05

## 2021-12-30 RX ADMIN — SENNA PLUS 2 TABLET(S): 8.6 TABLET ORAL at 21:32

## 2021-12-30 RX ADMIN — CHLORHEXIDINE GLUCONATE 1 APPLICATION(S): 213 SOLUTION TOPICAL at 11:46

## 2021-12-30 RX ADMIN — POLYETHYLENE GLYCOL 3350 17 GRAM(S): 17 POWDER, FOR SOLUTION ORAL at 11:46

## 2021-12-30 RX ADMIN — TRAMADOL HYDROCHLORIDE 50 MILLIGRAM(S): 50 TABLET ORAL at 17:03

## 2021-12-30 NOTE — PROGRESS NOTE ADULT - ASSESSMENT
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 Admitted to SICU post-op for hemodynamic monitoring due to dual pressor requirement intra-op. Transferred to floor/tele on POD#2, remains hemodynamically stable.     HCC, s/p segment 6 hepatectomy w/ cholecystectomy   - PRN pain regimen with Tylenol, Tramadol  - Regular diet  - Start SQH today   - IS, OOB and ambulate for atelectasis     HBV, Compensated Cirrhosis, Gastric Varices  - Continue home Tenofovir  - Holding home Coreg due to borderline SBP   - Daily MELD labs     HTN  - Holding home Lisinopril due to borderline SBP    Chronic SMA Dissection   - Holding home Plavix in bernadine-op setting    SCDs, holding SQH  PPI  CLD  OOBTC, ambulate as tolerated  Transfer to floor/tele from SICU       - Diet: advance as tolerated, cont IVF  - pain control  - strict I &Os   - resume tenofovir  - continue care per SICU team

## 2021-12-30 NOTE — PROGRESS NOTE ADULT - SUBJECTIVE AND OBJECTIVE BOX
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      Interval Events:  - Transferred from SICU to floor yesterday afternoon  - Oxygen desaturation overnight- placed on NC, IS encouraged, CXR w/ poor lung volumes  - Tolerated diet, ALISA SS, non-bilious   - Pain well controlled    Potential Discharge date: pending clinical improvement   Education:  Medications  Plan of care:  See Below    MEDICATIONS  (STANDING):  chlorhexidine 2% Cloths 1 Application(s) Topical daily  heparin   Injectable 5000 Unit(s) SubCutaneous every 8 hours  pantoprazole    Tablet 40 milliGRAM(s) Oral before breakfast  polyethylene glycol 3350 17 Gram(s) Oral daily  potassium phosphate / sodium phosphate Powder (PHOS-NaK) 1 Packet(s) Oral every 2 hours  senna 2 Tablet(s) Oral at bedtime  tenofovir disoproxil fumarate (VIREAD) 300 milliGRAM(s) Oral daily      MEDICATIONS  (PRN):  acetaminophen     Tablet .. 975 milliGRAM(s) Oral every 6 hours PRN Temp greater or equal to 38C (100.4F), Mild Pain (1 - 3)  naloxone Injectable 0.1 milliGRAM(s) IV Push every 3 minutes PRN For ANY of the following changes in patient status:  A. RR LESS THAN 10 breaths per minute, B. Oxygen saturation LESS THAN 90%, C. Sedation score of 6  ondansetron Injectable 4 milliGRAM(s) IV Push every 6 hours PRN Nausea  traMADol 25 milliGRAM(s) Oral every 4 hours PRN Moderate Pain (4 - 6)  traMADol 50 milliGRAM(s) Oral every 4 hours PRN Severe Pain (7 - 10)        PAST MEDICAL & SURGICAL HISTORY:  Hypertension  Liver disease  H/O esophageal varices  non-bleeding, on Carvedilol  HBV (hepatitis B virus) infection on tenofovir  HCC (hepatocellular carcinoma)  H/O sinus bradycardia  Nonalcoholic fatty liver disease without nonalcoholic steatohepatitis (LASSITER)  Language barrier speaks Bengali  Pre-diabetes  S/P endoscopy  S/P colonoscopy    Vital Signs Last 24 Hrs  Vital Signs Last 24 Hrs  T(C): 37.1 (30 Dec 2021 09:00), Max: 37.6 (30 Dec 2021 05:00)  T(F): 98.7 (30 Dec 2021 09:00), Max: 99.7 (30 Dec 2021 05:00)  HR: 95 (30 Dec 2021 09:00) (69 - 95)  BP: 122/88 (30 Dec 2021 09:00) (122/85 - 139/95)  BP(mean): --  RR: 18 (30 Dec 2021 09:00) (18 - 33)  SpO2: 95% (30 Dec 2021 09:00) (90% - 96%)      I&O's Summary  I&O's Detail    29 Dec 2021 07:01  -  30 Dec 2021 07:00  --------------------------------------------------------  IN:    dextrose 5% + lactated ringers: 200 mL    IV PiggyBack: 125 mL    multiple electrolytes Injection Type 1 Bolus: 500 mL    multiple electrolytes Injection Type 1.: 750 mL    multiple electrolytes Injection Type 1.: 125 mL    Oral Fluid: 340 mL  Total IN: 2040 mL    OUT:    Bulb (mL): 120 mL    Indwelling Catheter - Urethral (mL): 175 mL    Voided (mL): 1375 mL  Total OUT: 1670 mL    Total NET: 370 mL      30 Dec 2021 07:01  -  30 Dec 2021 09:38  --------------------------------------------------------  IN:  Total IN: 0 mL    OUT:    Bulb (mL): 35 mL  Total OUT: 35 mL    Total NET: -35 mL                          12.7   9.30  )-----------( 107      ( 30 Dec 2021 06:25 )             38.5     12-30    133<L>  |  97  |  13  ----------------------------<  132<H>  4.3   |  27  |  0.94    Ca    8.6      30 Dec 2021 06:25  Phos  2.5     12-30  Mg     2.0     12-30    TPro  6.0  /  Alb  3.6  /  TBili  1.8<H>  /  DBili  x   /  AST  139<H>  /  ALT  210<H>  /  AlkPhos  50  12-30    PT/INR - ( 29 Dec 2021 02:02 )   PT: 16.0 sec;   INR: 1.35 ratio    PTT - ( 29 Dec 2021 02:02 )  PTT:28.9 sec    COVID-19 PCR: NotDetec (26 Dec 2021 16:35)        Review of systems  Gen: No weight changes, fatigue, fevers/chills, weakness  Skin: No rashes  Head/Eyes/Ears/Mouth: No headache; Normal hearing; Normal vision w/o blurriness; No sinus pain/discomfort, sore throat  Respiratory: No dyspnea, cough, wheezing, hemoptysis  CV: No chest pain, PND, orthopnea  GI: denies diarrhea, constipation, nausea, vomiting, melena, hematochezia  : No increased frequency, dysuria, hematuria, nocturia  MSK: No joint pain/swelling; no back pain; no edema  Neuro: No dizziness/lightheadedness, weakness, seizures, numbness, tingling  Heme: No easy bruising or bleeding  Endo: No heat/cold intolerance  Psych: No significant nervousness, anxiety, stress, depression  All other systems were reviewed and are negative, except as noted.      PHYSICAL EXAM:  Constitutional: Well developed / well nourished  Eyes: Anicteric, PERRLA  ENMT: nc/at  Neck: supple  Respiratory: CTA B/L  Cardiovascular: RRR  Gastrointestinal: Soft, non distended, mild tenderness at the incision site; dressing c/d, ALISA with SS drainage    Genitourinary: Urinary catheter in place  Extremities: SCD's in place and working bilaterally  Vascular: Palpable dp pulses bilaterally  Neurological: A&O x3  Skin: no rashes, ulcerations or lesions;  Musculoskeletal: Moving all extremities  Psychiatric: Responsive

## 2021-12-31 ENCOUNTER — TRANSCRIPTION ENCOUNTER (OUTPATIENT)
Age: 66
End: 2021-12-31

## 2021-12-31 VITALS
DIASTOLIC BLOOD PRESSURE: 92 MMHG | SYSTOLIC BLOOD PRESSURE: 121 MMHG | TEMPERATURE: 98 F | RESPIRATION RATE: 18 BRPM | HEART RATE: 81 BPM | OXYGEN SATURATION: 94 %

## 2021-12-31 LAB
ALBUMIN SERPL ELPH-MCNC: 3.3 G/DL — SIGNIFICANT CHANGE UP (ref 3.3–5)
ALP SERPL-CCNC: 52 U/L — SIGNIFICANT CHANGE UP (ref 40–120)
ALT FLD-CCNC: 146 U/L — HIGH (ref 10–45)
ANION GAP SERPL CALC-SCNC: 12 MMOL/L — SIGNIFICANT CHANGE UP (ref 5–17)
AST SERPL-CCNC: 77 U/L — HIGH (ref 10–40)
BILIRUB SERPL-MCNC: 1.2 MG/DL — SIGNIFICANT CHANGE UP (ref 0.2–1.2)
BUN SERPL-MCNC: 16 MG/DL — SIGNIFICANT CHANGE UP (ref 7–23)
CALCIUM SERPL-MCNC: 8.4 MG/DL — SIGNIFICANT CHANGE UP (ref 8.4–10.5)
CHLORIDE SERPL-SCNC: 97 MMOL/L — SIGNIFICANT CHANGE UP (ref 96–108)
CO2 SERPL-SCNC: 24 MMOL/L — SIGNIFICANT CHANGE UP (ref 22–31)
CREAT SERPL-MCNC: 0.84 MG/DL — SIGNIFICANT CHANGE UP (ref 0.5–1.3)
GLUCOSE SERPL-MCNC: 126 MG/DL — HIGH (ref 70–99)
HCT VFR BLD CALC: 36.5 % — LOW (ref 39–50)
HGB BLD-MCNC: 12.1 G/DL — LOW (ref 13–17)
MAGNESIUM SERPL-MCNC: 1.9 MG/DL — SIGNIFICANT CHANGE UP (ref 1.6–2.6)
MCHC RBC-ENTMCNC: 28.8 PG — SIGNIFICANT CHANGE UP (ref 27–34)
MCHC RBC-ENTMCNC: 33.2 GM/DL — SIGNIFICANT CHANGE UP (ref 32–36)
MCV RBC AUTO: 86.9 FL — SIGNIFICANT CHANGE UP (ref 80–100)
NRBC # BLD: 0 /100 WBCS — SIGNIFICANT CHANGE UP (ref 0–0)
PHOSPHATE SERPL-MCNC: 2.2 MG/DL — LOW (ref 2.5–4.5)
PLATELET # BLD AUTO: 119 K/UL — LOW (ref 150–400)
POTASSIUM SERPL-MCNC: 4 MMOL/L — SIGNIFICANT CHANGE UP (ref 3.5–5.3)
POTASSIUM SERPL-SCNC: 4 MMOL/L — SIGNIFICANT CHANGE UP (ref 3.5–5.3)
PROT SERPL-MCNC: 5.7 G/DL — LOW (ref 6–8.3)
RBC # BLD: 4.2 M/UL — SIGNIFICANT CHANGE UP (ref 4.2–5.8)
RBC # FLD: 13.3 % — SIGNIFICANT CHANGE UP (ref 10.3–14.5)
SODIUM SERPL-SCNC: 133 MMOL/L — LOW (ref 135–145)
WBC # BLD: 7.11 K/UL — SIGNIFICANT CHANGE UP (ref 3.8–10.5)
WBC # FLD AUTO: 7.11 K/UL — SIGNIFICANT CHANGE UP (ref 3.8–10.5)

## 2021-12-31 PROCEDURE — C1889: CPT

## 2021-12-31 PROCEDURE — P9045: CPT

## 2021-12-31 PROCEDURE — 71045 X-RAY EXAM CHEST 1 VIEW: CPT

## 2021-12-31 PROCEDURE — 80048 BASIC METABOLIC PNL TOTAL CA: CPT

## 2021-12-31 PROCEDURE — 82565 ASSAY OF CREATININE: CPT

## 2021-12-31 PROCEDURE — 82947 ASSAY GLUCOSE BLOOD QUANT: CPT

## 2021-12-31 PROCEDURE — P9041: CPT

## 2021-12-31 PROCEDURE — 83735 ASSAY OF MAGNESIUM: CPT

## 2021-12-31 PROCEDURE — 82435 ASSAY OF BLOOD CHLORIDE: CPT

## 2021-12-31 PROCEDURE — 82962 GLUCOSE BLOOD TEST: CPT

## 2021-12-31 PROCEDURE — 85014 HEMATOCRIT: CPT

## 2021-12-31 PROCEDURE — C1769: CPT

## 2021-12-31 PROCEDURE — 36415 COLL VENOUS BLD VENIPUNCTURE: CPT

## 2021-12-31 PROCEDURE — 85018 HEMOGLOBIN: CPT

## 2021-12-31 PROCEDURE — 83605 ASSAY OF LACTIC ACID: CPT

## 2021-12-31 PROCEDURE — 88341 IMHCHEM/IMCYTCHM EA ADD ANTB: CPT

## 2021-12-31 PROCEDURE — 84100 ASSAY OF PHOSPHORUS: CPT

## 2021-12-31 PROCEDURE — 97166 OT EVAL MOD COMPLEX 45 MIN: CPT

## 2021-12-31 PROCEDURE — 85730 THROMBOPLASTIN TIME PARTIAL: CPT

## 2021-12-31 PROCEDURE — 86923 COMPATIBILITY TEST ELECTRIC: CPT

## 2021-12-31 PROCEDURE — C1751: CPT

## 2021-12-31 PROCEDURE — C9399: CPT

## 2021-12-31 PROCEDURE — 82248 BILIRUBIN DIRECT: CPT

## 2021-12-31 PROCEDURE — 88313 SPECIAL STAINS GROUP 2: CPT

## 2021-12-31 PROCEDURE — 85027 COMPLETE CBC AUTOMATED: CPT

## 2021-12-31 PROCEDURE — 84295 ASSAY OF SERUM SODIUM: CPT

## 2021-12-31 PROCEDURE — 82803 BLOOD GASES ANY COMBINATION: CPT

## 2021-12-31 PROCEDURE — 88304 TISSUE EXAM BY PATHOLOGIST: CPT

## 2021-12-31 PROCEDURE — 82330 ASSAY OF CALCIUM: CPT

## 2021-12-31 PROCEDURE — 97162 PT EVAL MOD COMPLEX 30 MIN: CPT

## 2021-12-31 PROCEDURE — 84132 ASSAY OF SERUM POTASSIUM: CPT

## 2021-12-31 PROCEDURE — 80053 COMPREHEN METABOLIC PANEL: CPT

## 2021-12-31 PROCEDURE — 85610 PROTHROMBIN TIME: CPT

## 2021-12-31 PROCEDURE — 88309 TISSUE EXAM BY PATHOLOGIST: CPT

## 2021-12-31 RX ORDER — CLOPIDOGREL BISULFATE 75 MG/1
1 TABLET, FILM COATED ORAL
Qty: 0 | Refills: 0 | DISCHARGE

## 2021-12-31 RX ORDER — SENNA PLUS 8.6 MG/1
2 TABLET ORAL
Qty: 60 | Refills: 0
Start: 2021-12-31 | End: 2022-01-29

## 2021-12-31 RX ORDER — TRAMADOL HYDROCHLORIDE 50 MG/1
0.5 TABLET ORAL
Qty: 6 | Refills: 0
Start: 2021-12-31 | End: 2022-01-02

## 2021-12-31 RX ADMIN — PANTOPRAZOLE SODIUM 40 MILLIGRAM(S): 20 TABLET, DELAYED RELEASE ORAL at 05:45

## 2021-12-31 RX ADMIN — POLYETHYLENE GLYCOL 3350 17 GRAM(S): 17 POWDER, FOR SOLUTION ORAL at 11:18

## 2021-12-31 RX ADMIN — CHLORHEXIDINE GLUCONATE 1 APPLICATION(S): 213 SOLUTION TOPICAL at 11:19

## 2021-12-31 RX ADMIN — HEPARIN SODIUM 5000 UNIT(S): 5000 INJECTION INTRAVENOUS; SUBCUTANEOUS at 05:42

## 2021-12-31 RX ADMIN — TENOFOVIR DISOPROXIL FUMARATE 300 MILLIGRAM(S): 300 TABLET, FILM COATED ORAL at 11:18

## 2021-12-31 NOTE — DISCHARGE NOTE NURSING/CASE MANAGEMENT/SOCIAL WORK - NSDCFUADDAPPT_GEN_ALL_CORE_FT
1. Please call to make a follow-up appointment with Dr. Jacome next week.   Phone: [phone]    [address] [place] [id number]    2. Please call  to schedule follow-up appointment with Hepatology next week     3. Please follow up with your primary care physician in one week regarding your hospitalization.

## 2021-12-31 NOTE — DISCHARGE NOTE PROVIDER - HOSPITAL COURSE
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 Jacome.  He was admitted to SICU post-op for hemodynamic monitoring due to dual pressor requirement intra-op. Transferred to floor/tele on POD#2. Remained hemodynamically stable. Remainder of hospital course was uneventful.  Romano was removed/passed TOV.  Regular diet tolerated.  Passing flatus. Ambulating in hallway.  Home medications resumed.      He was evaluated by the multi-disciplinary team including surgeon, ACP, pharmacist, nutrition, social work, and nursing and deemed stable for discharge with the following plan:     - DC home with ALISA to be removed as outpatient  - FU with Dr. 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 Jacome.  He was admitted to SICU post-op for hemodynamic monitoring due to dual pressor requirement intra-op. Transferred to floor/tele on POD#2. Remained hemodynamically stable. Remainder of hospital course was uneventful.  Romano was removed/passed TOV.  Regular diet tolerated.  Passing flatus. Ambulating in hallway.  Home medications resumed.      He was evaluated by the multi-disciplinary team including surgeon, ACP, pharmacist, nutrition, social work, and nursing and deemed stable for discharge with the following plan:     - DC home with ALISA to be removed as outpatient  - DC on Lasix 20mg daily  - FU with Dr. 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  He was admitted to SICU post-op for hemodynamic monitoring due to dual pressor requirement intra-op. Transferred to floor/tele on POD#2. Remained hemodynamically stable. Remainder of hospital course was uneventful.  Romano was removed/passed TOV.  Regular diet tolerated.  Passing flatus. Ambulating in hallway.  Home medications resumed.      He was evaluated by the multi-disciplinary team including surgeon, ACP, pharmacist, nutrition, social work, and nursing and deemed stable for discharge with the following plan:     - DC home with ALISA to be removed as outpatient  - DC on Lasix 20mg daily  - FU with Dr. Mcgowan next week  - FU with Hepatology next week

## 2021-12-31 NOTE — PROGRESS NOTE ADULT - ASSESSMENT
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 Admitted to SICU post-op for hemodynamic monitoring due to dual pressor requirement intra-op. Transferred to floor/tele on POD#2, remains hemodynamically stable.     HCC, s/p segment 6 hepatectomy w/ cholecystectomy   - PRN pain regimen with Tylenol, Tramadol  - Regular diet  - DVT PPx: SCDs and Heparin SQ   - IS, OOB and ambulate for atelectasis   - Will place steris over incision     HBV, Compensated Cirrhosis, Gastric Varices  - Continue home Tenofovir  - Daily MELD labs     HTN  - Restarting Coreg at 12.5     Chronic SMA Dissection   - Will restart Plavix today    strict I &Os  PPI  Regular diet   OOBTC, ambulate as tolerated  Plan for discharge today after drain teaching

## 2021-12-31 NOTE — DISCHARGE NOTE NURSING/CASE MANAGEMENT/SOCIAL WORK - NSDCPEFALRISK_GEN_ALL_CORE
For information on Fall & Injury Prevention, visit: [email or web address] OR  [email or web address] OR  https://www.cdc.gov/steadi/patient.html

## 2021-12-31 NOTE — DISCHARGE NOTE NURSING/CASE MANAGEMENT/SOCIAL WORK - PATIENT PORTAL LINK FT
You can access the FollowMyHealth Patient Portal offered by [facility] by registering at the following website: [email or web address]. By joining [facility]’s FollowMyHealth portal, you will also be able to view your health information using other applications (apps) compatible with our system.

## 2021-12-31 NOTE — DISCHARGE NOTE PROVIDER - NSDCMRMEDTOKEN_GEN_ALL_CORE_FT
carvedilol 25 mg oral tablet: 1 tab(s) orally every 12 hours  clopidogrel 75 mg oral tablet: 1 tab(s) orally once a day, last dose pre-op 12/22  lisinopril 2.5 mg oral tablet: 1 tab(s) orally once a day  omeprazole 40 mg oral delayed release capsule: 1 cap(s) orally once a day  tenofovir disoproxil fumarate 300 mg oral tablet: 1 tab(s) orally once a day   carvedilol 25 mg oral tablet: 0.5 tab(s) orally every 12 hours  clopidogrel 75 mg oral tablet: 1 tab(s) orally once a day  lisinopril 2.5 mg oral tablet: 1 tab(s) orally once a day  omeprazole 40 mg oral delayed release capsule: 1 cap(s) orally once a day  senna oral tablet: 2 tab(s) orally once a day (at bedtime)  tenofovir disoproxil fumarate 300 mg oral tablet: 1 tab(s) orally once a day  traMADol 50 mg oral tablet: 0.5 tab(s) orally every 6 hours, As Needed -Severe Pain (7 - 10) MDD:2 tabs   carvedilol 25 mg oral tablet: 0.5 tab(s) orally every 12 hours  clopidogrel 75 mg oral tablet: 1 tab(s) orally once a day  Lasix 20 mg oral tablet: 1 tab(s) orally once a day   lisinopril 2.5 mg oral tablet: 1 tab(s) orally once a day  omeprazole 40 mg oral delayed release capsule: 1 cap(s) orally once a day  senna oral tablet: 2 tab(s) orally once a day (at bedtime)  tenofovir disoproxil fumarate 300 mg oral tablet: 1 tab(s) orally once a day  traMADol 50 mg oral tablet: 0.5 tab(s) orally every 6 hours, As Needed -Severe Pain (7 - 10) MDD:2 tabs

## 2021-12-31 NOTE — PROGRESS NOTE ADULT - NUTRITIONAL ASSESSMENT
Diet, Clear Liquid (12-28-21 @ 13:58) [Active]
Diet, Regular (12-29-21 @ 16:45) [Active]
Diet, Regular (12-29-21 @ 16:45) [Active]

## 2021-12-31 NOTE — PROGRESS NOTE ADULT - SUBJECTIVE AND OBJECTIVE BOX
Transplant Surgery - Multidisciplinary Rounds  --------------------------------------------------------------  Date: 12/31/21         POD#3    Present:   Patient seen and examined with multidisciplinary Transplant team including Surgeon: Dr. Mcgowan. Nephrologist: Dr. Cano CPs: Yung, Residents: Zeke Montero, RNs in AM rounds.   Disciplines not in attendance will be notified of the plan.     Interval Events:  - Heparin SQ started yesterday   - Patient tolerating diet and PG     Potential Discharge date: today     Education: ALISA alas education for patient and daughter, Medications: Pain medications     Plan of care:  See Below    MEDICATIONS  (STANDING):  chlorhexidine 2% Cloths 1 Application(s) Topical daily  heparin   Injectable 5000 Unit(s) SubCutaneous every 8 hours  pantoprazole    Tablet 40 milliGRAM(s) Oral before breakfast  polyethylene glycol 3350 17 Gram(s) Oral daily  senna 2 Tablet(s) Oral at bedtime  tenofovir disoproxil fumarate (VIREAD) 300 milliGRAM(s) Oral daily    MEDICATIONS  (PRN):  acetaminophen     Tablet .. 975 milliGRAM(s) Oral every 6 hours PRN Temp greater or equal to 38C (100.4F), Mild Pain (1 - 3)  naloxone Injectable 0.1 milliGRAM(s) IV Push every 3 minutes PRN For ANY of the following changes in patient status:  A. RR LESS THAN 10 breaths per minute, B. Oxygen saturation LESS THAN 90%, C. Sedation score of 6  ondansetron Injectable 4 milliGRAM(s) IV Push every 6 hours PRN Nausea  traMADol 25 milliGRAM(s) Oral every 4 hours PRN Moderate Pain (4 - 6)  traMADol 50 milliGRAM(s) Oral every 4 hours PRN Severe Pain (7 - 10)      PAST MEDICAL & SURGICAL HISTORY:  Hypertension    Liver disease    H/O esophageal varices  non-bleeding, on Carvedilol    HBV (hepatitis B virus) infection  on tenofovir    HCC (hepatocellular carcinoma)    H/O sinus bradycardia  40&#x27;s-50&#x27;s normal HR    Nonalcoholic fatty liver disease without nonalcoholic steatohepatitis (LASSITER)    Language barrier  speaks [place]    Pre-diabetes    S/P endoscopy    S/P colonoscopy        Vital Signs Last 24 Hrs  T(C): 36.9 (31 Dec 2021 09:00), Max: 36.9 (30 Dec 2021 17:00)  T(F): 98.4 (31 Dec 2021 09:00), Max: 98.5 (30 Dec 2021 23:20)  HR: 83 (31 Dec 2021 09:00) (83 - 93)  BP: 120/90 (31 Dec 2021 09:00) (120/90 - 130/94)  BP(mean): --  RR: 18 (31 Dec 2021 09:00) (18 - 18)  SpO2: 93% (31 Dec 2021 09:00) (90% - 97%)    I&O's Summary    30 Dec 2021 07:01  -  31 Dec 2021 07:00  --------------------------------------------------------  IN: 960 mL / OUT: 1335 mL / NET: -375 mL    ALISA: 275cc serosanguinous                             12.1   7.11  )-----------( 119      ( 31 Dec 2021 06:24 )             36.5     12-31    133<L>  |  97  |  16  ----------------------------<  126<H>  4.0   |  24  |  0.84    Ca    8.4      31 Dec 2021 06:24  Phos  2.2     12-31  Mg     1.9     12-31    TPro  5.7<L>  /  Alb  3.3  /  TBili  1.2  /  DBili  x   /  AST  77<H>  /  ALT  146<H>  /  AlkPhos  52  12-31        Review of systems  Gen: No weight changes, fatigue, fevers/chills, weakness  Skin: No rashes  Head/Eyes/Ears/Mouth: No headache; Normal hearing; Normal vision w/o blurriness; No sinus pain/discomfort, sore throat  Respiratory: No dyspnea, cough, wheezing, hemoptysis  CV: No chest pain, PND, orthopnea  GI: C/O mild abdominal pain at surgical site. no diarrhea, constipation, nausea, vomiting, melena, hematochezia  : No increased frequency, dysuria, hematuria, nocturia  MSK: No joint pain/swelling; no back pain; no edema  Neuro: No dizziness/lightheadedness, weakness, seizures, numbness, tingling  Heme: No easy bruising or bleeding  Endo: No heat/cold intolerance  Psych: No significant nervousness, anxiety, stress, depression  All other systems were reviewed and are negative, except as noted.      PHYSICAL EXAM:  Constitutional: Well developed / well nourished  Eyes: Anicteric, PERRLA  ENMT: nc/at, no thrush  Neck: supple  Respiratory: CTA B/L  Cardiovascular: RRR  Gastrointestinal: Soft abdomen, milt distension, appropriate incisional TTP. incision c/d/i dressing taken down. No signs of infection, RLQ ALISA serosanguinous output   Genitourinary: Voiding spontaneously  Extremities: SCD's in place and working bilaterally  Neurological: A&O x3  Skin: no rashes, ulcerations, lesions  Musculoskeletal: Moving all extremities  Psychiatric: Responsive     Transplant Surgery - Multidisciplinary Rounds  --------------------------------------------------------------  Date of Surgery: 12/28/21         POD#3    Present:   Patient seen and examined with multidisciplinary Transplant team including Surgeon: Dr. Mcgowan. Nephrologist: Dr. Cano CPs: Yung, Residents: Zeke Montero, RNs in AM rounds.   Disciplines not in attendance will be notified of the plan.     Interval Events:  - Heparin SQ started yesterday   - Patient tolerating diet and PG     Potential Discharge date: today     Education: ALISA alas education for patient and daughter, Medications: Pain medications     Plan of care:  See Below    MEDICATIONS  (STANDING):  chlorhexidine 2% Cloths 1 Application(s) Topical daily  heparin   Injectable 5000 Unit(s) SubCutaneous every 8 hours  pantoprazole    Tablet 40 milliGRAM(s) Oral before breakfast  polyethylene glycol 3350 17 Gram(s) Oral daily  senna 2 Tablet(s) Oral at bedtime  tenofovir disoproxil fumarate (VIREAD) 300 milliGRAM(s) Oral daily    MEDICATIONS  (PRN):  acetaminophen     Tablet .. 975 milliGRAM(s) Oral every 6 hours PRN Temp greater or equal to 38C (100.4F), Mild Pain (1 - 3)  naloxone Injectable 0.1 milliGRAM(s) IV Push every 3 minutes PRN For ANY of the following changes in patient status:  A. RR LESS THAN 10 breaths per minute, B. Oxygen saturation LESS THAN 90%, C. Sedation score of 6  ondansetron Injectable 4 milliGRAM(s) IV Push every 6 hours PRN Nausea  traMADol 25 milliGRAM(s) Oral every 4 hours PRN Moderate Pain (4 - 6)  traMADol 50 milliGRAM(s) Oral every 4 hours PRN Severe Pain (7 - 10)      PAST MEDICAL & SURGICAL HISTORY:  Hypertension    Liver disease    H/O esophageal varices  non-bleeding, on Carvedilol    HBV (hepatitis B virus) infection  on tenofovir    HCC (hepatocellular carcinoma)    H/O sinus bradycardia  40&#x27;s-50&#x27;s normal HR    Nonalcoholic fatty liver disease without nonalcoholic steatohepatitis (LASSITER)    Language barrier  speaks Israeli    Pre-diabetes    S/P endoscopy    S/P colonoscopy        Vital Signs Last 24 Hrs  T(C): 36.9 (31 Dec 2021 09:00), Max: 36.9 (30 Dec 2021 17:00)  T(F): 98.4 (31 Dec 2021 09:00), Max: 98.5 (30 Dec 2021 23:20)  HR: 83 (31 Dec 2021 09:00) (83 - 93)  BP: 120/90 (31 Dec 2021 09:00) (120/90 - 130/94)  BP(mean): --  RR: 18 (31 Dec 2021 09:00) (18 - 18)  SpO2: 93% (31 Dec 2021 09:00) (90% - 97%)    I&O's Summary    30 Dec 2021 07:01  -  31 Dec 2021 07:00  --------------------------------------------------------  IN: 960 mL / OUT: 1335 mL / NET: -375 mL    ALISA: 275cc serosanguinous                             12.1   7.11  )-----------( 119      ( 31 Dec 2021 06:24 )             36.5     12-31    133<L>  |  97  |  16  ----------------------------<  126<H>  4.0   |  24  |  0.84    Ca    8.4      31 Dec 2021 06:24  Phos  2.2     12-31  Mg     1.9     12-31    TPro  5.7<L>  /  Alb  3.3  /  TBili  1.2  /  DBili  x   /  AST  77<H>  /  ALT  146<H>  /  AlkPhos  52  12-31        Review of systems  Gen: No weight changes, fatigue, fevers/chills, weakness  Skin: No rashes  Head/Eyes/Ears/Mouth: No headache; Normal hearing; Normal vision w/o blurriness; No sinus pain/discomfort, sore throat  Respiratory: No dyspnea, cough, wheezing, hemoptysis  CV: No chest pain, PND, orthopnea  GI: C/O mild abdominal pain at surgical site. no diarrhea, constipation, nausea, vomiting, melena, hematochezia  : No increased frequency, dysuria, hematuria, nocturia  MSK: No joint pain/swelling; no back pain; no edema  Neuro: No dizziness/lightheadedness, weakness, seizures, numbness, tingling  Heme: No easy bruising or bleeding  Endo: No heat/cold intolerance  Psych: No significant nervousness, anxiety, stress, depression  All other systems were reviewed and are negative, except as noted.      PHYSICAL EXAM:  Constitutional: Well developed / well nourished  Eyes: Anicteric, PERRLA  ENMT: nc/at, no thrush  Neck: supple  Respiratory: CTA B/L  Cardiovascular: RRR  Gastrointestinal: Soft abdomen, milt distension, appropriate incisional TTP. incision c/d/i dressing taken down. No signs of infection, RLQ ALISA serosanguinous output   Genitourinary: Voiding spontaneously  Extremities: SCD's in place and working bilaterally  Neurological: A&O x3  Skin: no rashes, ulcerations, lesions  Musculoskeletal: Moving all extremities  Psychiatric: Responsive

## 2021-12-31 NOTE — PROGRESS NOTE ADULT - REASON FOR ADMISSION
Liver resection
Partial hepatectomy, cholecystectomy
Liver resection
Segment 6 Liver mass
Liver resection

## 2021-12-31 NOTE — DISCHARGE NOTE PROVIDER - NSDCHHNEEDSERVICEOTHER_GEN_ALL_CORE_FT
Empty and record ALISA drain twice daily and as needed. Secure ALISA drain to your clothing with safety pin

## 2021-12-31 NOTE — PROGRESS NOTE ADULT - ATTENDING COMMENTS
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  Patient required Phenylephrine and Norepinephrine during case.  Patient received in SICU extubated.    Awake and alert  Pain well controlled with oral meds  Hemodynamically stable saturating well on RA  Clears  LFT renal function electrolytes PO4, all wnl  Mild increase in serum lactate, will give small bolus and recheck, perfusion appears adequate  Mechanical DVT ppx
POD3 post seg 6 resection for HCC  doing well  pain controlled  mobilizing  brittany diet  wound clean  Anna - 300ml serous, no bile    P  d/c with drain  start lasix 20mg daily  restart plavix

## 2021-12-31 NOTE — DISCHARGE NOTE PROVIDER - NSDCFUADDAPPT_GEN_ALL_CORE_FT
1. Please call to make a follow-up appointment with Dr. Jacome next week.   Phone: [phone]    [address] [place] [id number]    2. Please call  to schedule follow-up appointment with Hepatology next week     3. Please follow up with your primary care physician in one week regarding your hospitalization.   1. Please call to make a follow-up appointment with Dr. Mcgowan next week.   Phone: [phone]    [address] [place] [address], [place] [address]    2. Please call  to schedule follow-up appointment with Hepatology next week     3. Please follow up with your primary care physician in one week regarding your hospitalization.

## 2021-12-31 NOTE — DISCHARGE NOTE PROVIDER - NSDCCPCAREPLAN_GEN_ALL_CORE_FT
PRINCIPAL DISCHARGE DIAGNOSIS  Diagnosis: HCC (hepatocellular carcinoma)  Assessment and Plan of Treatment: You have undergone surgery to remove a mass on your liver.  - Avoid heavy lifting aything over 5lbs for six weeks following your surgery date. Do NOT drive a car or operate machinery unless cleared by your surgeon during your follow up visit. Avoid straining, use stool softners as needed. Stop stool softners if diarrhea develops.   - Call transplant clinic if you develop fever, increased abdominal pain, redness/swelling or bleeding around your incision site  - Bathing: Shower is allowed, you can let soap and water flow over your incision; do NOT rub the area. Bath is NOT allowed until your incision is healed and you have been cleared by your transplant surgeon.   - Continue medications as previously prescribed        SECONDARY DISCHARGE DIAGNOSES  Diagnosis: Hypertension  Assessment and Plan of Treatment: Be sure to follow a low salt diet, avoid caffeine, reduce alcohol intake.  If you have been prescribed antihypertensive medications to control your blood pressure, be sure to take them every day as prescribed and do not miss any doses, the medications do not work if they are not taken consistently. Follow up with your Primary Care Doctor and have your Blood Pressure checked regularly.       Diagnosis: CAD (coronary artery disease)  Assessment and Plan of Treatment: You many resume Plavix as previously prescribed.   Follow-up with your cardiologist for routine care

## 2021-12-31 NOTE — DISCHARGE NOTE PROVIDER - CARE PROVIDER_API CALL
Jose Jacome)  Surgery  [address] [place] [address]  Phone: [phone]  Fax: [phone]  Follow Up Time:    Alejandro Mcgowan)  Surgery  Organ Transplant  [address] [place] [address]  Phone: [phone]  Fax: [phone]  Follow Up Time:

## 2022-01-01 RX ORDER — FUROSEMIDE 40 MG
1 TABLET ORAL
Qty: 14 | Refills: 0
Start: 2022-01-01 | End: 2022-01-14

## 2022-01-04 ENCOUNTER — APPOINTMENT (OUTPATIENT)
Dept: TRANSPLANT | Facility: CLINIC | Age: 67
End: 2022-01-04

## 2022-01-04 VITALS
HEART RATE: 67 BPM | WEIGHT: 165 LBS | DIASTOLIC BLOOD PRESSURE: 71 MMHG | OXYGEN SATURATION: 98 % | BODY MASS INDEX: 26.52 KG/M2 | SYSTOLIC BLOOD PRESSURE: 107 MMHG | TEMPERATURE: 98.5 F | HEIGHT: 66 IN | RESPIRATION RATE: 12 BRPM

## 2022-01-11 ENCOUNTER — NON-APPOINTMENT (OUTPATIENT)
Age: 67
End: 2022-01-11

## 2022-01-11 ENCOUNTER — APPOINTMENT (OUTPATIENT)
Dept: HEPATOLOGY | Facility: CLINIC | Age: 67
End: 2022-01-11

## 2022-01-11 LAB — SURGICAL PATHOLOGY STUDY: SIGNIFICANT CHANGE UP

## 2022-01-19 ENCOUNTER — APPOINTMENT (OUTPATIENT)
Dept: TRANSPLANT | Facility: CLINIC | Age: 67
End: 2022-01-19
Payer: MEDICARE

## 2022-01-19 ENCOUNTER — RESULT REVIEW (OUTPATIENT)
Age: 67
End: 2022-01-19

## 2022-01-19 VITALS
HEIGHT: 66 IN | SYSTOLIC BLOOD PRESSURE: 94 MMHG | HEART RATE: 66 BPM | WEIGHT: 166 LBS | RESPIRATION RATE: 12 BRPM | TEMPERATURE: 98.6 F | BODY MASS INDEX: 26.68 KG/M2 | DIASTOLIC BLOOD PRESSURE: 61 MMHG | OXYGEN SATURATION: 97 %

## 2022-01-19 PROCEDURE — 99024 POSTOP FOLLOW-UP VISIT: CPT

## 2022-01-19 NOTE — HISTORY OF PRESENT ILLNESS
[de-identified] : 67yo man post segment 6 resection for HCC liver 12/28/21\par has recovered well, without issue\par \par path - mod diff, pT2, *"parenchymal resection margin involved by carcinoma", \par - pathology to be clarified as intraoperatively, possible macrovascular PV invasion and staple line excised showing intravascular tumor (ie - NOT parenchymal reseciton margin)\par \par \par Preop workup:\par \par MRI 9/24/21 - cirrhotic morphology, mild hepatic steatosis, 2.2x1.7cm seg 6 lesion with faint arterial enhancenment, PV washout and pseudocapsule, LIRADS-5 lesion\par AFP 2.5\par no recent fevers/weight gain\par \par b/g chronic HBV (dx 2004), on tenofovir\par compensated cirrhosis with non-bleeding large varices on carvedilol, endoscopy 5/22/19\par etoh - social, 1 drink/d, makes own wine\par \par PMHx\par 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

## 2022-01-19 NOTE — ASSESSMENT
[FreeTextEntry1] : 67yo man recovered post seg6 resection of HCC\par pathology - report to be clarified\par \par discussed possible options with pt/daughter\par 1) oncology consultation re: adjuvant Ctx\par \par 2)For CT liver\par if lesion at resection margin, and if pathology r/v shows macrovascular invasion > for LRT (y90 vs MWA)\par if lesion at margin and NO macrovascular invasion > consider surgical re-resection vs LRT\par if no lesion > for adjuvant chemo and followup

## 2022-01-20 LAB
AFP-TM SERPL-MCNC: 2.8 NG/ML
ALBUMIN SERPL ELPH-MCNC: 4.2 G/DL
ALP BLD-CCNC: 98 U/L
ALT SERPL-CCNC: 32 U/L
ANION GAP SERPL CALC-SCNC: 13 MMOL/L
AST SERPL-CCNC: 26 U/L
BASOPHILS # BLD AUTO: 0.02 K/UL
BASOPHILS NFR BLD AUTO: 0.4 %
BILIRUB SERPL-MCNC: 0.5 MG/DL
BUN SERPL-MCNC: 15 MG/DL
CALCIUM SERPL-MCNC: 9.7 MG/DL
CHLORIDE SERPL-SCNC: 104 MMOL/L
CHOLEST SERPL-MCNC: 178 MG/DL
CO2 SERPL-SCNC: 24 MMOL/L
CREAT SERPL-MCNC: 1.02 MG/DL
EOSINOPHIL # BLD AUTO: 0.24 K/UL
EOSINOPHIL NFR BLD AUTO: 4.7 %
GLUCOSE SERPL-MCNC: 117 MG/DL
HBV CORE IGG+IGM SER QL: REACTIVE
HCT VFR BLD CALC: 43.1 %
HDLC SERPL-MCNC: 38 MG/DL
HGB BLD-MCNC: 13.4 G/DL
IMM GRANULOCYTES NFR BLD AUTO: 0.2 %
INR PPP: 1.13 RATIO
LDLC SERPL CALC-MCNC: 108 MG/DL
LYMPHOCYTES # BLD AUTO: 1.69 K/UL
LYMPHOCYTES NFR BLD AUTO: 32.9 %
MAN DIFF?: NORMAL
MCHC RBC-ENTMCNC: 29.1 PG
MCHC RBC-ENTMCNC: 31.1 GM/DL
MCV RBC AUTO: 93.5 FL
MONOCYTES # BLD AUTO: 0.37 K/UL
MONOCYTES NFR BLD AUTO: 7.2 %
NEUTROPHILS # BLD AUTO: 2.8 K/UL
NEUTROPHILS NFR BLD AUTO: 54.6 %
NONHDLC SERPL-MCNC: 139 MG/DL
PLATELET # BLD AUTO: 212 K/UL
POTASSIUM SERPL-SCNC: 4.2 MMOL/L
PROT SERPL-MCNC: 6.9 G/DL
PT BLD: 13.3 SEC
RBC # BLD: 4.61 M/UL
RBC # FLD: 14.1 %
SODIUM SERPL-SCNC: 141 MMOL/L
TRIGL SERPL-MCNC: 155 MG/DL
WBC # FLD AUTO: 5.13 K/UL

## 2022-01-21 ENCOUNTER — TRANSCRIPTION ENCOUNTER (OUTPATIENT)
Age: 67
End: 2022-01-21

## 2022-01-21 ENCOUNTER — APPOINTMENT (OUTPATIENT)
Dept: CT IMAGING | Facility: CLINIC | Age: 67
End: 2022-01-21
Payer: MEDICARE

## 2022-01-21 ENCOUNTER — APPOINTMENT (OUTPATIENT)
Dept: CT IMAGING | Facility: CLINIC | Age: 67
End: 2022-01-21

## 2022-01-21 ENCOUNTER — OUTPATIENT (OUTPATIENT)
Dept: OUTPATIENT SERVICES | Facility: HOSPITAL | Age: 67
LOS: 1 days | End: 2022-01-21
Payer: COMMERCIAL

## 2022-01-21 ENCOUNTER — APPOINTMENT (OUTPATIENT)
Dept: HEPATOLOGY | Facility: CLINIC | Age: 67
End: 2022-01-21
Payer: MEDICARE

## 2022-01-21 VITALS
HEIGHT: 66 IN | OXYGEN SATURATION: 96 % | RESPIRATION RATE: 12 BRPM | TEMPERATURE: 97.3 F | HEART RATE: 66 BPM | DIASTOLIC BLOOD PRESSURE: 67 MMHG | SYSTOLIC BLOOD PRESSURE: 98 MMHG | BODY MASS INDEX: 26.36 KG/M2 | WEIGHT: 164 LBS

## 2022-01-21 DIAGNOSIS — Z98.890 OTHER SPECIFIED POSTPROCEDURAL STATES: Chronic | ICD-10-CM

## 2022-01-21 DIAGNOSIS — C22.0 LIVER CELL CARCINOMA: ICD-10-CM

## 2022-01-21 LAB
HBV DNA # SERPL NAA+PROBE: NOT DETECTED IU/ML
HEPB DNA PCR LOG: NOT DETECTED LOG10IU/ML

## 2022-01-21 PROCEDURE — 99214 OFFICE O/P EST MOD 30 MIN: CPT

## 2022-01-21 PROCEDURE — 74170 CT ABD WO CNTRST FLWD CNTRST: CPT

## 2022-01-21 PROCEDURE — 74170 CT ABD WO CNTRST FLWD CNTRST: CPT | Mod: 26

## 2022-01-21 RX ORDER — OMEPRAZOLE 40 MG/1
40 CAPSULE, DELAYED RELEASE ORAL
Qty: 1 | Refills: 0 | Status: DISCONTINUED | COMMUNITY
Start: 2019-05-22 | End: 2022-01-21

## 2022-01-21 NOTE — HISTORY OF PRESENT ILLNESS
[Fatigue] : denies fatigue [Malaise] : denies malaise [Fever] : denies fever [Diffuse Joint Pain (Arthralgias)] : denies arthralgias [Muscle Aches, Generalized (Myalgias)] : denies myalgias [Yellow Skin Or Eyes (Jaundice)] : denies jaundice [Abdominal Pain] : denies abdominal pain [Urine Tests Nonspecific Abnormal Findings Biliuria] : denies dark urine [Light Colored Bowel Movement (Acholic Stools)] : denies pale stools [Insomnia] : denies insomnia [Skin: Rash] : denies rash [Itching (Pruritus)] : denies pruritus [Shortness Of Breath] : denies shortness of breath [Needlestick Exposure] : no needlestick exposure [Infected Sexual Partner] : no infected sexual partner [IV Drug Use] : no IV drug use [Tattoo] : no tattoos [Body Piercing] : no body piercing [Hemodialysis] : no hemodialysis [Transfusion before 1992] : no transfusion before 1992 [Transplant before 1992] : no transplant before 1992 [Incarceration] : no incarceration [Alcohol Abuse] : no alcohol abuse [Autoimmune Disorder] : no autoimmune disorder [Household Contact to HBV] : no household contact to HBV [Travel to Endemic Area] : no travel to an endemic area [Occupational Exposure] : no occupational exposure [Cocaine Use] : no cocaine use [Wt Gain ___ Lbs] : no recent weight gain [Wt Loss ___ Lbs] : no recent weight loss [de-identified] : - 1/21/22: returns after resection of HCC. Tumor board presentation on 1/20/22 showed positive margin. Per Dr. Mcgowan's note, intra-operative impression was possible macrovascular portal vein invasion and staple line excised showing intravascular tumor (ie - NOT parenchymal reseciton margin). Feels well, denies abdominal pain except mild when he coughs or sneezes. Lost 10 lbs.\par Exam: abdomen soft, nontender, well-healed surgical scar. No edema.\par \par - 5/3/31: returns after hospitalization at [facility] 3/25 b/o SMA dissection, saw MARIA INES Messina after discharge, now on clopidogrel. Back pain has resolved.\par \par - 2/4/21: returns after bloodwork. MRI done at [facility] report not available. Had hypotension in December, , but no hematochezia or melena. Feels good, is active. Has not lost weight, BMI 28.2\par \par - 10/19/20: returns after US showed 1 cm liver lesion and MRI showed indeterminate lesion. Doing well. GERD controlled with omeprazole. \par \par - 7/20/20: returns after labs in January. Tolerated carvedilol 25 mg bid, is very active gardening, makes his own wine. Takes tenofovir.\par - 11/4/20: return visit\par - 6/3/19: here after EGD which showed large esophageal varices. Tolerating carvedilol 12.5 mg bid - I switched from nadolol b/o bradycardia on sub-optimal dose. Today HR in the 50s as always. Feels very energetic - built the handrails of stairs and porch around his house in 3 days, but has longstanding sleep problems at night. \par \par - 7/15/19: doing well, tolerating carvedilol 25 mg bid, but HR 49 and BP sometimes 90s at home - got dizzy at home after getting up quickly; also taking omeprazole for GERD seen on EGD 5/2019.  CT 2/2019 report reviewed with patient.\par \par - initial visit  2/5/19: Mr. KNOX is a 63 year old man with chronic hepatitis B (dx. 2004), on Viread since diagnosis, compensated cirrhosis, large varices that never bled, on carvedilol, GERD, overweight BMI 27, 176 lbs. \par \par weight: never signif. heavier than now - overweight. Alcohol: social use. Never more than 1 drink per day. SHx: born and raised in [facility], lived in [facility] after age 28. Wife [place].\par \par Workup:\par - 3/16/21 CTA abdomen: liver normal, chronic dissection SMA with persistent dilatation 1.6 cm.\par - 10/7/20 MRI (scanned): 6 mm liver lesion inferior R lobe seg 6/7, T2 hyperintense, no definitive arterial enhancement; motion artefact. No overt cirrhotic features. Recommend MRI after 4-6 mo. Moderate fatty replacement-marbling throughout pancreas.\par - 9/25/20 US abdomen: cirrhosis, 1 cm liver lesion indeterminate.\par - 3/17/20 US abdomen: mild hepatic steatosis. No lesion. GB normal.\par - 9/18/19 MRI ([facility]): mildly cirrhotic liver, sub-cm lesions likely cysts. Spleen normal. Stable SMA dissection. Normal GB. 1.1 cm ivanna hepatitis T2 hyperintense and enhancing lymph node. \par - 2/25/19 CT abdomen wwo (scanned): cirrhosis, small probable hemangioma seg IVb. SMA dissection several cm. Atherosclerotic changes. No comparison possible.\par - 5/22/19 EGD: medium-sized varices, not banded. LA-grade C reflux esophagitis. Two gastric inlet patches in the upper esophagus. Mild erosive gastritis, biopsied. Started Omeprazole 40 mg/d. Given bradycardia, switched nadolol 20 mg/d to carvedilol - start 6.25 mg bid, increase to 12.5 mg bid after 3 days if tolerated. Path: nonspecific gastritis, H. pylori negative.\par - 11/2016 MRI abdomen: Cirrhosis. 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 The remaining vessels are patent. No significant varices. IMPRESSION: Cirrhosis. No evidence of hepatoma\par - Colonoscopy: normal in 2011

## 2022-01-21 NOTE — ASSESSMENT
[FreeTextEntry1] : Mr. KNOX is a 66 year old man, orig. from [facility], with chronic hepatitis B, LASSITER, and HCC.\par \par - compensated cirrhosis, normal MELD labs, PLT < 150. 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 and chronic hepatitis B\par   - medium-sized esophageal varices (EGD 5/22/19) that never bled, on carvedilol 25 mg bid - tolerating now after initial dizziness when getting up\par - chronic hepatitis B diagnosed in 2005, unclear source of infection; on Viread since diagnosis, negative viral load\par - LASSITER - mild NAFLD suggested by US; intermittently mildly elevated AST < 50 U/L\par - HCC s/p segment 6 resection 12/28/2021 by Dr. Mcgowan, margins positive. AFP was never elevated.\par - LFTs normal 1/2022\par - sub-cm hepatic cysts\par - glucose intolerance\par - overweight, BMI 26\par \par - GERD, LA-grade C reflux esophagitis (EGD 5/2019): takes TUMS every night. PPI helped in past.\par - colon cancer screening: will schedule\par \par Plan:\par - HCC: f/u CT today, f/u with oncology, Dr. Link for possible enrollment in trial for chemotherapy or locoregional/surgical treatment if lesion detected.\par - varices, now lower BP: halve carvedilol to 12.5 mg bid\par - HBV: continue tenofovir\par - GERD: continue omeprazole.\par - NAFLD/LASSITER: weight loss and exercise. Continue Vit E 800 IU/mL \par - colonoscopy: will readdress depending on course of HCC\par - return in 3 months after repeat labs. \par

## 2022-01-26 ENCOUNTER — APPOINTMENT (OUTPATIENT)
Dept: TRANSPLANT | Facility: CLINIC | Age: 67
End: 2022-01-26
Payer: MEDICARE

## 2022-01-26 VITALS
OXYGEN SATURATION: 97 % | RESPIRATION RATE: 12 BRPM | SYSTOLIC BLOOD PRESSURE: 128 MMHG | TEMPERATURE: 97.6 F | HEART RATE: 58 BPM | WEIGHT: 167 LBS | DIASTOLIC BLOOD PRESSURE: 86 MMHG | BODY MASS INDEX: 26.84 KG/M2 | HEIGHT: 66 IN

## 2022-01-26 PROCEDURE — 99024 POSTOP FOLLOW-UP VISIT: CPT

## 2022-01-26 NOTE — ASSESSMENT
[FreeTextEntry1] : CT abdomen reviewed - no evidence of residual tumor in liver\par \par Plan\par for oncology MDT consultation tomorrow - consideration for adjuvant trial\par for repeat MRI in 3 months\par f/u in clinic after

## 2022-01-26 NOTE — HISTORY OF PRESENT ILLNESS
[FreeTextEntry1] : 67yo man post segment 6 resection for HCC liver 12/28/21\par has recovered well, without issue\par \par path - mod diff, pT2, *"parenchymal resection margin involved by carcinoma", \par - pathology to be clarified as intraoperatively, possible macrovascular PV invasion and staple line excised showing intravascular tumor (ie - NOT parenchymal reseciton margin)\par \par \par Preop workup:\par \par MRI 9/24/21 - cirrhotic morphology, mild hepatic steatosis, 2.2x1.7cm seg 6 lesion with faint arterial enhancenment, PV washout and pseudocapsule, LIRADS-5 lesion\par AFP 2.5\par no recent fevers/weight gain\par \par b/g chronic HBV (dx 2004), on tenofovir\par compensated cirrhosis with non-bleeding large varices on carvedilol, endoscopy 5/22/19\par etoh - social, 1 drink/d, makes own wine\par \par PMHx\par 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

## 2022-01-26 NOTE — PHYSICAL EXAM
[Normal] : supple, no neck mass and thyroid not enlarged [Normal Neck Lymph Nodes] : normal neck lymph nodes  [Normal Supraclavicular Lymph Nodes] : normal supraclavicular lymph nodes [Normal Groin Lymph Nodes] : normal groin lymph nodes [Normal Axillary Lymph Nodes] : normal axillary lymph nodes [Normal] : oriented to person, place and time, with appropriate affect [de-identified] : subcostal incision healed,

## 2022-01-27 ENCOUNTER — APPOINTMENT (OUTPATIENT)
Dept: MULTI SPECIALTY CLINIC | Facility: CLINIC | Age: 67
End: 2022-01-27
Payer: MEDICARE

## 2022-01-27 ENCOUNTER — NON-APPOINTMENT (OUTPATIENT)
Age: 67
End: 2022-01-27

## 2022-01-27 ENCOUNTER — OUTPATIENT (OUTPATIENT)
Dept: OUTPATIENT SERVICES | Facility: HOSPITAL | Age: 67
LOS: 1 days | Discharge: HOME | End: 2022-01-27

## 2022-01-27 VITALS
WEIGHT: 167.55 LBS | RESPIRATION RATE: 14 BRPM | HEART RATE: 75 BPM | BODY MASS INDEX: 27.25 KG/M2 | OXYGEN SATURATION: 98 % | SYSTOLIC BLOOD PRESSURE: 121 MMHG | TEMPERATURE: 97 F | HEIGHT: 65.94 IN | DIASTOLIC BLOOD PRESSURE: 82 MMHG

## 2022-01-27 DIAGNOSIS — Z98.890 OTHER SPECIFIED POSTPROCEDURAL STATES: Chronic | ICD-10-CM

## 2022-01-27 DIAGNOSIS — C22.0 LIVER CELL CARCINOMA: ICD-10-CM

## 2022-01-27 PROCEDURE — 99205 OFFICE O/P NEW HI 60 MIN: CPT

## 2022-01-27 RX ORDER — TRAMADOL HYDROCHLORIDE 50 MG/1
50 TABLET, COATED ORAL 3 TIMES DAILY
Qty: 15 | Refills: 0 | Status: DISCONTINUED | COMMUNITY
Start: 2022-01-04 | End: 2022-01-27

## 2022-01-27 RX ORDER — POLYETHYLENE GLYCOL 3350 AND ELECTROLYTES WITH LEMON FLAVOR 236; 22.74; 6.74; 5.86; 2.97 G/4L; G/4L; G/4L; G/4L; G/4L
236 POWDER, FOR SOLUTION ORAL
Qty: 1 | Refills: 0 | Status: DISCONTINUED | COMMUNITY
Start: 2021-05-26 | End: 2022-01-27

## 2022-01-27 RX ORDER — CLOPIDOGREL BISULFATE 75 MG/1
75 TABLET, FILM COATED ORAL DAILY
Qty: 90 | Refills: 2 | Status: DISCONTINUED | COMMUNITY
Start: 2021-04-07 | End: 2022-01-27

## 2022-02-01 NOTE — HISTORY OF PRESENT ILLNESS
[de-identified] : This is a non-billable note*\par \par \par Mr. PADMINI KNOX is a 66 year old male who presents for evaluation in our Liver [facility].\par PMH includes; chronic Hep B(hepatitis B on Viread since 2004), compensated cirrhosis(large varices on carvedilol), GERD, HTN, LASSITER(BMI 28).\par He is s/p Resection(liver, S6) on 12/28/2021 Per Dr. Mcgowan intraoperatively impression was possible macrovascular invasion in a portal vein invasion and staple line excised showing intravascular tumor(ie- NOT parenchymal resection)\par surgical Path:  #[id number] nothing a mod diff HCC with microvascular invasion and positive parenchymal resection margin.\par \par Work up:\par  3/16/21 CTA abdomen: liver normal, chronic dissection SMA with persistent dilatation 1.6 cm.\par - 10/7/20 MRI (scanned): 6 mm liver lesion inferior R lobe seg 6/7, T2 hyperintense, no definitive arterial enhancement; motion artefact. No overt cirrhotic features. Recommend MRI after 4-6 mo. Moderate fatty replacement-marbling throughout pancreas.\par - 9/25/20 US abdomen: cirrhosis, 1 cm liver lesion indeterminate.\par - 3/17/20 US abdomen: mild hepatic steatosis. No lesion. GB normal.\par - 9/18/19 MRI ([facility]): mildly cirrhotic liver, sub-cm lesions likely cysts. Spleen normal. Stable SMA dissection. Normal GB. 1.1 cm ivanna hepatitis T2 hyperintense and enhancing lymph node. \par - 2/25/19 CT abdomen wwo (scanned): cirrhosis, small probable hemangioma seg IVb. SMA dissection several cm. Atherosclerotic changes. No comparison possible.\par - 5/22/19 EGD: medium-sized varices, not banded. LA-grade C reflux esophagitis. Two gastric inlet patches in the upper esophagus. Mild erosive gastritis, biopsied. Started Omeprazole 40 mg/d. Given bradycardia, switched nadolol 20 mg/d to carvedilol - start 6.25 mg bid, increase to 12.5 mg bid after 3 days if tolerated. Path: nonspecific gastritis, H. pylori negative.\par - 11/2016 MRI abdomen: Cirrhosis. 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 The remaining vessels are patent. No significant varices. IMPRESSION: Cirrhosis. No evidence of hepatoma\par - Colonoscopy: normal in 2011 \par \par 10/12/2021 Labs:  Platelet 129, INR 1.07, Ast/Alt/Alp 24/31/63, T.Bili 0.6, Bun/Cr 20/1.11, Markers: AFP 2.5\par

## 2022-02-05 NOTE — REASON FOR VISIT
[Initial Consultation] : an initial consultation [Family Member] : family member [FreeTextEntry2] : Stage II HCC s/p resection

## 2022-02-05 NOTE — HISTORY OF PRESENT ILLNESS
[Disease: _____________________] : Disease: [unfilled] [T: ___] : T[unfilled] [N: ___] : N[unfilled] [M: ___] : M[unfilled] [AJCC Stage: ____] : AJCC Stage: [unfilled] [de-identified] : 66 M w/ Hepatitis B (dx 2004) on Tenofovir presents s/p resected HCC, \par \par Klement underwent a routine surveillance US abdomen in Sept 2021 which noted an indeterminant liver lesion. An MRI Abd on 9/24/21 showed a 2.2. x 1.7 cm segment 6 lesion LIRADS 5. Pt asymptomatic and well compensated. Does have known large varices on Coreg, last EGD 5/2019. Underwent resection of segment 6 HCC on 12/28/21. Final path T2 moderately differentiated HCC with positive margin. Post op course unremarkable. \par \par Pt referred for to medical oncology for consideration of adjuvant trial with Pembrolizumab vs placebo. \par \par \par \par \par \par \par \par  [de-identified] : moderately differentiated hepatocellular carcinoma, + margin  [de-identified] : + 10-15 lb weight loss in several months. In general very active. Denies any pain.

## 2022-04-01 ENCOUNTER — APPOINTMENT (OUTPATIENT)
Dept: MRI IMAGING | Facility: CLINIC | Age: 67
End: 2022-04-01

## 2022-04-14 ENCOUNTER — APPOINTMENT (OUTPATIENT)
Dept: TRANSPLANT | Facility: CLINIC | Age: 67
End: 2022-04-14

## 2022-04-19 LAB
AFP-TM SERPL-MCNC: 2.2 NG/ML
ALBUMIN SERPL ELPH-MCNC: 4.7 G/DL
ALP BLD-CCNC: 63 U/L
ALT SERPL-CCNC: 21 U/L
ANION GAP SERPL CALC-SCNC: 12 MMOL/L
AST SERPL-CCNC: 24 U/L
BASOPHILS # BLD AUTO: 0.02 K/UL
BASOPHILS NFR BLD AUTO: 0.4 %
BILIRUB SERPL-MCNC: 0.5 MG/DL
BUN SERPL-MCNC: 19 MG/DL
CALCIUM SERPL-MCNC: 10.1 MG/DL
CHLORIDE SERPL-SCNC: 105 MMOL/L
CHOLEST SERPL-MCNC: 163 MG/DL
CO2 SERPL-SCNC: 24 MMOL/L
CREAT SERPL-MCNC: 1.13 MG/DL
EGFR: 71 ML/MIN/1.73M2
EOSINOPHIL # BLD AUTO: 0.21 K/UL
EOSINOPHIL NFR BLD AUTO: 4.6 %
GLUCOSE SERPL-MCNC: 112 MG/DL
HCT VFR BLD CALC: 44.8 %
HDLC SERPL-MCNC: 44 MG/DL
HGB BLD-MCNC: 14.4 G/DL
IMM GRANULOCYTES NFR BLD AUTO: 0.2 %
INR PPP: 1.1 RATIO
LDLC SERPL CALC-MCNC: 102 MG/DL
LYMPHOCYTES # BLD AUTO: 1.53 K/UL
LYMPHOCYTES NFR BLD AUTO: 33.3 %
MAN DIFF?: NORMAL
MCHC RBC-ENTMCNC: 28.3 PG
MCHC RBC-ENTMCNC: 32.1 GM/DL
MCV RBC AUTO: 88.2 FL
MONOCYTES # BLD AUTO: 0.44 K/UL
MONOCYTES NFR BLD AUTO: 9.6 %
NEUTROPHILS # BLD AUTO: 2.39 K/UL
NEUTROPHILS NFR BLD AUTO: 51.9 %
NONHDLC SERPL-MCNC: 119 MG/DL
PLATELET # BLD AUTO: 120 K/UL
POTASSIUM SERPL-SCNC: 5.3 MMOL/L
PROT SERPL-MCNC: 6.9 G/DL
PT BLD: 12.9 SEC
RBC # BLD: 5.08 M/UL
RBC # FLD: 14.3 %
SODIUM SERPL-SCNC: 141 MMOL/L
TRIGL SERPL-MCNC: 88 MG/DL
WBC # FLD AUTO: 4.6 K/UL

## 2022-05-02 ENCOUNTER — RX RENEWAL (OUTPATIENT)
Age: 67
End: 2022-05-02

## 2022-05-03 ENCOUNTER — OUTPATIENT (OUTPATIENT)
Dept: OUTPATIENT SERVICES | Facility: HOSPITAL | Age: 67
LOS: 1 days | End: 2022-05-03
Payer: COMMERCIAL

## 2022-05-03 ENCOUNTER — APPOINTMENT (OUTPATIENT)
Dept: MRI IMAGING | Facility: CLINIC | Age: 67
End: 2022-05-03
Payer: MEDICARE

## 2022-05-03 DIAGNOSIS — C22.0 LIVER CELL CARCINOMA: ICD-10-CM

## 2022-05-03 DIAGNOSIS — Z98.890 OTHER SPECIFIED POSTPROCEDURAL STATES: Chronic | ICD-10-CM

## 2022-05-03 PROCEDURE — 74183 MRI ABD W/O CNTR FLWD CNTR: CPT | Mod: 26,MH

## 2022-05-03 PROCEDURE — A9585: CPT

## 2022-05-03 PROCEDURE — 74183 MRI ABD W/O CNTR FLWD CNTR: CPT

## 2022-05-06 ENCOUNTER — APPOINTMENT (OUTPATIENT)
Dept: HEPATOLOGY | Facility: CLINIC | Age: 67
End: 2022-05-06
Payer: MEDICARE

## 2022-05-06 VITALS
OXYGEN SATURATION: 97 % | HEART RATE: 50 BPM | TEMPERATURE: 97.3 F | RESPIRATION RATE: 14 BRPM | SYSTOLIC BLOOD PRESSURE: 119 MMHG | HEIGHT: 65 IN | BODY MASS INDEX: 28.66 KG/M2 | WEIGHT: 172 LBS | DIASTOLIC BLOOD PRESSURE: 83 MMHG

## 2022-05-06 PROCEDURE — 99214 OFFICE O/P EST MOD 30 MIN: CPT

## 2022-05-06 RX ORDER — PNV NO.95/FERROUS FUM/FOLIC AC 28MG-0.8MG
400 TABLET ORAL DAILY
Qty: 60 | Refills: 11 | Status: DISCONTINUED | COMMUNITY
Start: 2021-02-04 | End: 2022-05-06

## 2022-05-06 NOTE — HISTORY OF PRESENT ILLNESS
[Fatigue] : denies fatigue [Malaise] : denies malaise [Fever] : denies fever [Diffuse Joint Pain (Arthralgias)] : denies arthralgias [Muscle Aches, Generalized (Myalgias)] : denies myalgias [Yellow Skin Or Eyes (Jaundice)] : denies jaundice [Abdominal Pain] : denies abdominal pain [Urine Tests Nonspecific Abnormal Findings Biliuria] : denies dark urine [Light Colored Bowel Movement (Acholic Stools)] : denies pale stools [Insomnia] : denies insomnia [Skin: Rash] : denies rash [Itching (Pruritus)] : denies pruritus [Shortness Of Breath] : denies shortness of breath [Needlestick Exposure] : no needlestick exposure [Infected Sexual Partner] : no infected sexual partner [IV Drug Use] : no IV drug use [Tattoo] : no tattoos [Body Piercing] : no body piercing [Hemodialysis] : no hemodialysis [Transfusion before 1992] : no transfusion before 1992 [Transplant before 1992] : no transplant before 1992 [Incarceration] : no incarceration [Alcohol Abuse] : no alcohol abuse [Autoimmune Disorder] : no autoimmune disorder [Household Contact to HBV] : no household contact to HBV [Travel to Endemic Area] : no travel to an endemic area [Occupational Exposure] : no occupational exposure [Cocaine Use] : no cocaine use [Wt Gain ___ Lbs] : no recent weight gain [Wt Loss ___ Lbs] : no recent weight loss [de-identified] : - 5/6/22: returns after bloodwork and CT in January. LFTs normal, AFP 2.2 (was never elevated).\par \par - 1/21/22: returns after resection of HCC. Tumor board presentation on 1/20/22 showed positive margin. Per Dr. Mcgowan's note, intra-operative impression was possible macrovascular portal vein invasion and staple line excised showing intravascular tumor (ie - NOT parenchymal reseciton margin). Feels well, denies abdominal pain except mild when he coughs or sneezes. Lost 10 lbs.\par Exam: abdomen soft, nontender, well-healed surgical scar. No edema.\par \par - 5/3/31: returns after hospitalization at [facility] 3/25 b/o SMA dissection, saw MARIA INES Messina after discharge, now on clopidogrel. Back pain has resolved.\par \par - 2/4/21: returns after bloodwork. MRI done at [facility] report not available. Had hypotension in December, , but no hematochezia or melena. Feels good, is active. Has not lost weight, BMI 28.2\par \par - 10/19/20: returns after US showed 1 cm liver lesion and MRI showed indeterminate lesion. Doing well. GERD controlled with omeprazole. \par \par - 7/20/20: returns after labs in January. Tolerated carvedilol 25 mg bid, is very active gardening, makes his own wine. Takes tenofovir.\par - 11/4/20: return visit\par - 6/3/19: here after EGD which showed large esophageal varices. Tolerating carvedilol 12.5 mg bid - I switched from nadolol b/o bradycardia on sub-optimal dose. Today HR in the 50s as always. Feels very energetic - built the handrails of stairs and porch around his house in 3 days, but has longstanding sleep problems at night. \par \par - 7/15/19: doing well, tolerating carvedilol 25 mg bid, but HR 49 and BP sometimes 90s at home - got dizzy at home after getting up quickly; also taking omeprazole for GERD seen on EGD 5/2019.  CT 2/2019 report reviewed with patient.\par \par - initial visit  2/5/19: Mr. KNOX is a 63 year old man with chronic hepatitis B (dx. 2004), on Viread since diagnosis, compensated cirrhosis, large varices that never bled, on carvedilol, GERD, overweight BMI 27, 176 lbs. \par \par weight: never signif. heavier than now - overweight. Alcohol: social use. Never more than 1 drink per day. SHx: born and raised in [facility], lived in [place] after age 28. Wife [place].\par \par Workup:\par - 1/19/22 CT abdomen: R lobe postoperative changes after partial hepatectomy. No evidence of residual tumor. Patent portal veins.  Cirrhosis. Peripheral hypervascular foci R lobe and L lobe - - suggestive of shunts. Spleen normal. No ascites. Atherosclerosis. Persistent SMA dilation 1.5 cm without dissection, unchanged.\par - 3/16/21 CTA abdomen: liver normal, chronic dissection SMA with persistent dilatation 1.6 cm.\par - 10/7/20 MRI (scanned): 6 mm liver lesion inferior R lobe seg 6/7, T2 hyperintense, no definitive arterial enhancement; motion artefact. No overt cirrhotic features. Recommend MRI after 4-6 mo. Moderate fatty replacement-marbling throughout pancreas.\par - 9/25/20 US abdomen: cirrhosis, 1 cm liver lesion indeterminate.\par - 3/17/20 US abdomen: mild hepatic steatosis. No lesion. GB normal.\par - 9/18/19 MRI ([facility]): mildly cirrhotic liver, sub-cm lesions likely cysts. Spleen normal. Stable SMA dissection. Normal GB. 1.1 cm ivanna hepatitis T2 hyperintense and enhancing lymph node. \par - 2/25/19 CT abdomen wwo (scanned): cirrhosis, small probable hemangioma seg IVb. SMA dissection several cm. Atherosclerotic changes. No comparison possible.\par - 5/22/19 EGD: medium-sized varices, not banded. LA-grade C reflux esophagitis. Two gastric inlet patches in the upper esophagus. Mild erosive gastritis, biopsied. Started Omeprazole 40 mg/d. Given bradycardia, switched nadolol 20 mg/d to carvedilol - start 6.25 mg bid, increase to 12.5 mg bid after 3 days if tolerated. Path: nonspecific gastritis, H. pylori negative.\par - 11/2016 MRI abdomen: Cirrhosis. 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 The remaining vessels are patent. No significant varices. IMPRESSION: Cirrhosis. No evidence of hepatoma\par - Colonoscopy: normal in 2011

## 2022-05-06 NOTE — ASSESSMENT
[FreeTextEntry1] : Mr. KNOX is a 66 year old man, orig. from [facility], with chronic hepatitis B, LASSITER, and HCC.\par \par - compensated cirrhosis, normal MELD labs, PLT < 150. 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 and chronic hepatitis B\par   - medium-sized esophageal varices (EGD 5/22/19) that never bled, on carvedilol 25 mg bid - tolerating now after initial dizziness when getting up\par - chronic hepatitis B diagnosed in 2005, unclear source of infection; on Viread since diagnosis, negative viral load\par - LASSITER - mild NAFLD suggested by US; intermittently mildly elevated AST < 50 U/L\par - HCC, init. 2.2 cm lesion segment 6 in 9/2021, s/p resection 12/28/2021 by Dr. Mcgowan, margins positive. AFP was never elevated. Saw oncology, Dr. Link, is thinking about participation in trial neoadjuvant pembrolizumab vs. placebo or just surveillance.\par - LFTs normal 1/2022\par - sub-cm hepatic cysts\par - glucose intolerance\par - overweight, BMI 26\par \par - SMA dissection 3/2021 - on clopidogrel\par - GERD, LA-grade C reflux esophagitis (EGD 5/2019): takes TUMS every night. PPI helped in past.\par - colon cancer screening: will schedule\par \par Plan:\par - HCC: f/u read of MRI, he will see Dr. Mcgowan \par - varices, now lower BP: continue carvedilol 12.5 mg bid, repeat EGD\par - HBV: continue tenofovir\par - GERD: continue omeprazole.\par - NAFLD/LASSITER: weight loss and exercise. Continue Vit E 800 IU/mL \par - colonoscopy: schedule, together with EGD\par - return in 3 months after repeat bloodwork\par

## 2022-05-10 ENCOUNTER — RESULT REVIEW (OUTPATIENT)
Age: 67
End: 2022-05-10

## 2022-05-12 ENCOUNTER — APPOINTMENT (OUTPATIENT)
Dept: TRANSPLANT | Facility: CLINIC | Age: 67
End: 2022-05-12

## 2022-06-22 ENCOUNTER — NON-APPOINTMENT (OUTPATIENT)
Age: 67
End: 2022-06-22

## 2022-06-22 ENCOUNTER — APPOINTMENT (OUTPATIENT)
Dept: INTERNAL MEDICINE | Facility: CLINIC | Age: 67
End: 2022-06-22
Payer: MEDICARE

## 2022-06-22 VITALS
SYSTOLIC BLOOD PRESSURE: 120 MMHG | DIASTOLIC BLOOD PRESSURE: 82 MMHG | OXYGEN SATURATION: 96 % | HEIGHT: 65 IN | BODY MASS INDEX: 28.66 KG/M2 | HEART RATE: 84 BPM | WEIGHT: 172 LBS

## 2022-06-22 DIAGNOSIS — M19.049 PRIMARY OSTEOARTHRITIS, UNSPECIFIED HAND: ICD-10-CM

## 2022-06-22 PROCEDURE — G0439: CPT

## 2022-06-22 PROCEDURE — 93000 ELECTROCARDIOGRAM COMPLETE: CPT

## 2022-06-22 NOTE — ASSESSMENT
[FreeTextEntry1] : 1.  HCM - colonosocpy is due.  Had a PSA in 2020 which was normal and unchanged from 2018.  No prostate symptoms.  Consider repeat next year.  Had his COVID series.  Rec Shingrix at the drug store.\par 2.  Hep B cirrhosis with removal of a HCC this past year doing well.   Followed by Hepatology on Tenofovir.  Labs reviewed and are UTD including lipids.\par 3.  HTN - On lisinopril without problem.  BP controlled.  Now following with cardiology because of the SMA dissection.\par 4.  Increasing forgetfulness with anxiety - labs last year neg.  Stable and without complaints of worsening\par 5.  Recent SMA dissection - on clopidogrel.  This was incidentally found.  No symptoms or complaints of pain with eating or abdominal pain.\par 6.  Right thumb base arthritis - rec hand surgery evaluation for cortisone injection and xrays.\par 7.  Prediabetes - stable.  Discussed with pt and wife that his sugars need to be followed periodically.\par 8.  RTO 1 year or prn.\par \par

## 2022-06-22 NOTE — HEALTH RISK ASSESSMENT
[Very Good] : ~his/her~  mood as very good [No] : In the past 12 months have you used drugs other than those required for medical reasons? No [No falls in past year] : Patient reported no falls in the past year [Financial] : financial [With Family] : lives with family [Employed] : employed [] :  [Feels Safe at Home] : Feels safe at home [Fully functional (bathing, dressing, toileting, transferring, walking, feeding)] : Fully functional (bathing, dressing, toileting, transferring, walking, feeding) [Fully functional (using the telephone, shopping, preparing meals, housekeeping, doing laundry, using] : Fully functional and needs no help or supervision to perform IADLs (using the telephone, shopping, preparing meals, housekeeping, doing laundry, using transportation, managing medications and managing finances) [Reports changes in hearing] : Reports changes in hearing [Smoke Detector] : smoke detector [Carbon Monoxide Detector] : carbon monoxide detector [Seat Belt] :  uses seat belt [Sunscreen] : uses sunscreen [Former] : Former [15-19] : 15-19 [Yes] : Yes [Patient refused screening] : Patient refused screening [YearQuit] : 2000 [de-identified] : Very active [Change in mental status noted] : No change in mental status noted [Language] : denies difficulty with language [Behavior] : denies difficulty with behavior [Learning/Retaining New Information] : denies difficulty learning/retaining new information [Handling Complex Tasks] : denies difficulty handling complex tasks [Reasoning] : denies difficulty with reasoning [Spatial Ability and Orientation] : denies difficulty with spatial ability and orientation [Guns at Home] : no guns at home [ColonoscopyDate] : 2011 [ColonoscopyComments] : done through GI clinic.  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

## 2022-06-22 NOTE — COUNSELING
[Fall prevention counseling provided] : Fall prevention counseling provided [Health Goal(s) Reviewed with Patient] : Health Goal(s) Reviewed with Patient [[id number]] : Maintain a healthy lifestyle\par  [[id number]] : none noted

## 2022-06-27 DIAGNOSIS — H91.90 UNSPECIFIED HEARING LOSS, UNSPECIFIED EAR: ICD-10-CM

## 2022-07-05 ENCOUNTER — APPOINTMENT (OUTPATIENT)
Dept: OTOLARYNGOLOGY | Facility: CLINIC | Age: 67
End: 2022-07-05

## 2022-07-05 VITALS
SYSTOLIC BLOOD PRESSURE: 114 MMHG | BODY MASS INDEX: 28.66 KG/M2 | HEIGHT: 65 IN | DIASTOLIC BLOOD PRESSURE: 74 MMHG | WEIGHT: 172 LBS | HEART RATE: 60 BPM | TEMPERATURE: 97.34 F

## 2022-07-05 PROCEDURE — 99204 OFFICE O/P NEW MOD 45 MIN: CPT | Mod: 25

## 2022-07-05 PROCEDURE — G0268 REMOVAL OF IMPACTED WAX MD: CPT

## 2022-07-05 PROCEDURE — 92567 TYMPANOMETRY: CPT

## 2022-07-05 PROCEDURE — 92557 COMPREHENSIVE HEARING TEST: CPT

## 2022-07-05 NOTE — DATA REVIEWED
[de-identified] : 07/05/22: stable Au since 2020:\par RT: severe to profound mixed HL and LT: WNL to mod-severe SNHL

## 2022-07-05 NOTE — ASSESSMENT
[FreeTextEntry1] : Mr. KNOX is a 66 year male with hearing loss R>L, cerumen removed from both ear canals today, otherwise he has a normal ear exam.  There is a mixed component to right profound HL, but present since childhood.  Has never had MRI for asymmetry \par \par - audio today as above\par - pt cleared for hearing aids\par - MRI IAC no cont - eval for acoustic neuroma at his convenience; will call for results\par - annual f/up \par

## 2022-07-05 NOTE — PROCEDURE
[FreeTextEntry2] : cerumen impaction\par  [FreeTextEntry3] : After informed verbal consent is obtained, cerumen is removed from the b/l ear canal with a curette & suction. Pt tolerated well, no residual ear canal irritation. \par \par

## 2022-07-05 NOTE — END OF VISIT
[FreeTextEntry3] : I personally saw and examined PADMINI KNOX in detail.  I spoke to MALA Curiel regarding the assessment and plan of care. I performed the procedures and relevant physical exam.  I have reviewed the above assessment and plan of care and I agree.  I have made changes to the body of the note wherever necessary and appropriate.\par

## 2022-07-05 NOTE — PHYSICAL EXAM
[Midline] : trachea located in midline position [Normal] : no rashes [de-identified] : cerumen bilaterally

## 2022-07-05 NOTE — HISTORY OF PRESENT ILLNESS
[de-identified] : Hx of Right hearing loss since 2 or 4 y/o following infection. has had no change since then, no other associated sx of vertigo or brain stem sx.  Now notes some decreased hearing on left side over past few years.  \par  [FreeTextEntry1] : pt here with wife as he needs some translation feels his hearing loss is worsening, rt sided hearing loss since childhood and left side is worsening, pt is ready to be eval for hearing aids\par last audiogram 2020

## 2022-07-14 ENCOUNTER — APPOINTMENT (OUTPATIENT)
Dept: ORTHOPEDIC SURGERY | Facility: CLINIC | Age: 67
End: 2022-07-14

## 2022-07-14 DIAGNOSIS — M18.12 UNILATERAL PRIMARY OSTEOARTHRITIS OF FIRST CARPOMETACARPAL JOINT, LEFT HAND: ICD-10-CM

## 2022-07-14 DIAGNOSIS — M18.11 UNILATERAL PRIMARY OSTEOARTHRITIS OF FIRST CARPOMETACARPAL JOINT, RIGHT HAND: ICD-10-CM

## 2022-07-14 PROCEDURE — 73130 X-RAY EXAM OF HAND: CPT | Mod: 50

## 2022-07-14 PROCEDURE — 20605 DRAIN/INJ JOINT/BURSA W/O US: CPT | Mod: RT

## 2022-07-14 PROCEDURE — 99204 OFFICE O/P NEW MOD 45 MIN: CPT | Mod: 25

## 2022-07-25 LAB
AFP-TM SERPL-MCNC: 2.1 NG/ML
ALBUMIN SERPL ELPH-MCNC: 4.5 G/DL
ALP BLD-CCNC: 62 U/L
ALT SERPL-CCNC: 28 U/L
ANION GAP SERPL CALC-SCNC: 12 MMOL/L
AST SERPL-CCNC: 24 U/L
BASOPHILS # BLD AUTO: 0.02 K/UL
BASOPHILS NFR BLD AUTO: 0.4 %
BILIRUB SERPL-MCNC: 0.8 MG/DL
BUN SERPL-MCNC: 17 MG/DL
CALCIUM SERPL-MCNC: 9.5 MG/DL
CHLORIDE SERPL-SCNC: 103 MMOL/L
CHOLEST SERPL-MCNC: 190 MG/DL
CO2 SERPL-SCNC: 25 MMOL/L
CREAT SERPL-MCNC: 1.02 MG/DL
EGFR: 80 ML/MIN/1.73M2
EOSINOPHIL # BLD AUTO: 0.21 K/UL
EOSINOPHIL NFR BLD AUTO: 4 %
ESTIMATED AVERAGE GLUCOSE: 128 MG/DL
GLUCOSE SERPL-MCNC: 106 MG/DL
HBA1C MFR BLD HPLC: 6.1 %
HCT VFR BLD CALC: 45.8 %
HDLC SERPL-MCNC: 47 MG/DL
HGB BLD-MCNC: 15.6 G/DL
IMM GRANULOCYTES NFR BLD AUTO: 0.6 %
INR PPP: 1.08 RATIO
LDLC SERPL CALC-MCNC: 115 MG/DL
LYMPHOCYTES # BLD AUTO: 1.57 K/UL
LYMPHOCYTES NFR BLD AUTO: 30.1 %
MAN DIFF?: NORMAL
MCHC RBC-ENTMCNC: 29.2 PG
MCHC RBC-ENTMCNC: 34.1 GM/DL
MCV RBC AUTO: 85.8 FL
MONOCYTES # BLD AUTO: 0.42 K/UL
MONOCYTES NFR BLD AUTO: 8.1 %
NEUTROPHILS # BLD AUTO: 2.96 K/UL
NEUTROPHILS NFR BLD AUTO: 56.8 %
NONHDLC SERPL-MCNC: 143 MG/DL
PLATELET # BLD AUTO: 115 K/UL
POTASSIUM SERPL-SCNC: 4.6 MMOL/L
PROT SERPL-MCNC: 6.6 G/DL
PT BLD: 12.6 SEC
RBC # BLD: 5.34 M/UL
RBC # FLD: 14.2 %
SODIUM SERPL-SCNC: 140 MMOL/L
TRIGL SERPL-MCNC: 139 MG/DL
WBC # FLD AUTO: 5.21 K/UL

## 2022-08-01 ENCOUNTER — APPOINTMENT (OUTPATIENT)
Dept: MRI IMAGING | Facility: CLINIC | Age: 67
End: 2022-08-01

## 2022-08-01 ENCOUNTER — APPOINTMENT (OUTPATIENT)
Dept: PHARMACY | Facility: CLINIC | Age: 67
End: 2022-08-01

## 2022-08-01 ENCOUNTER — OUTPATIENT (OUTPATIENT)
Dept: OUTPATIENT SERVICES | Facility: HOSPITAL | Age: 67
LOS: 1 days | End: 2022-08-01
Payer: COMMERCIAL

## 2022-08-01 DIAGNOSIS — H90.3 SENSORINEURAL HEARING LOSS, BILATERAL: ICD-10-CM

## 2022-08-01 DIAGNOSIS — Z00.8 ENCOUNTER FOR OTHER GENERAL EXAMINATION: ICD-10-CM

## 2022-08-01 DIAGNOSIS — Z98.890 OTHER SPECIFIED POSTPROCEDURAL STATES: Chronic | ICD-10-CM

## 2022-08-01 PROCEDURE — 70551 MRI BRAIN STEM W/O DYE: CPT

## 2022-08-01 PROCEDURE — 70551 MRI BRAIN STEM W/O DYE: CPT | Mod: 26

## 2022-08-01 PROCEDURE — V5010 ASSESSMENT FOR HEARING AID: CPT

## 2022-08-02 ENCOUNTER — NON-APPOINTMENT (OUTPATIENT)
Age: 67
End: 2022-08-02

## 2022-08-02 ENCOUNTER — APPOINTMENT (OUTPATIENT)
Dept: VASCULAR SURGERY | Facility: CLINIC | Age: 67
End: 2022-08-02

## 2022-08-02 VITALS
SYSTOLIC BLOOD PRESSURE: 115 MMHG | BODY MASS INDEX: 28.66 KG/M2 | TEMPERATURE: 208.94 F | DIASTOLIC BLOOD PRESSURE: 82 MMHG | HEART RATE: 61 BPM | HEIGHT: 65 IN | WEIGHT: 172 LBS

## 2022-08-02 PROCEDURE — 93975 VASCULAR STUDY: CPT

## 2022-08-02 PROCEDURE — 99214 OFFICE O/P EST MOD 30 MIN: CPT

## 2022-08-02 NOTE — PHYSICAL EXAM
[Alert] : alert [Oriented to Person] : oriented to person [Oriented to Place] : oriented to place [Oriented to Time] : oriented to time [Calm] : calm [JVD] : no jugular venous distention  [Ankle Swelling (On Exam)] : not present [Varicose Veins Of Lower Extremities] : not present [] : not present [de-identified] : nad [de-identified] : wml [FreeTextEntry1] : abd soft nt nd  [de-identified] : wml [de-identified] : wml [de-identified] : Aron Cranial nerves 2-12 aron grossly intact [de-identified] : cooperative

## 2022-08-02 NOTE — HISTORY OF PRESENT ILLNESS
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

## 2022-08-02 NOTE — ASSESSMENT
[Arterial/Venous Disease] : arterial/venous disease [Medication Management] : medication management [FreeTextEntry1] : Impression asymptomatic SMA dissection w ectasia clinically stable \par \par \par Plan Med Conservative management\par f/u w Dr Byrd for bp managemnt prn \par continue plavix 75 daily \par ov w Abd US s/o SMA dissection and ectasia 12mo aug 2023\par d/w pt and daughter d/c  plavix   and start baby asa daily \par this will need clearance from hepatologist  and if not cleareed then will need to remain on plavix 75 mg 2x/week\par pt and daughter will d/w hepatologist and call back w  decision

## 2022-08-02 NOTE — DATA REVIEWED
[FreeTextEntry1] : 4/27/2021  Abd US sig for SMA dissection  remain w proximal segment 1.5 cm \par \par 7/27/2021 Abd US sig for SMA mid level chronic  dissection  remains w proximal segment 1.5 cm dilatation \par \par 8/2/2022  Abd US sig for SMA mid level chronic  dissection  remains w proximal segment 1.6 cm dilatation

## 2022-08-05 ENCOUNTER — APPOINTMENT (OUTPATIENT)
Dept: HEPATOLOGY | Facility: CLINIC | Age: 67
End: 2022-08-05

## 2022-08-05 VITALS
RESPIRATION RATE: 20 BRPM | TEMPERATURE: 206.96 F | WEIGHT: 177 LBS | OXYGEN SATURATION: 96 % | HEART RATE: 49 BPM | BODY MASS INDEX: 29.49 KG/M2 | HEIGHT: 65 IN | DIASTOLIC BLOOD PRESSURE: 82 MMHG | SYSTOLIC BLOOD PRESSURE: 119 MMHG

## 2022-08-05 DIAGNOSIS — D18.03 HEMANGIOMA OF INTRA-ABDOMINAL STRUCTURES: ICD-10-CM

## 2022-08-05 DIAGNOSIS — Z12.11 ENCOUNTER FOR SCREENING FOR MALIGNANT NEOPLASM OF COLON: ICD-10-CM

## 2022-08-05 PROCEDURE — 99214 OFFICE O/P EST MOD 30 MIN: CPT

## 2022-08-05 NOTE — HISTORY OF PRESENT ILLNESS
[Fatigue] : denies fatigue [Malaise] : denies malaise [Fever] : denies fever [Diffuse Joint Pain (Arthralgias)] : denies arthralgias [Muscle Aches, Generalized (Myalgias)] : denies myalgias [Yellow Skin Or Eyes (Jaundice)] : denies jaundice [Abdominal Pain] : denies abdominal pain [Urine Tests Nonspecific Abnormal Findings Biliuria] : denies dark urine [Light Colored Bowel Movement (Acholic Stools)] : denies pale stools [Insomnia] : denies insomnia [Skin: Rash] : denies rash [Itching (Pruritus)] : denies pruritus [Shortness Of Breath] : denies shortness of breath [Needlestick Exposure] : no needlestick exposure [Infected Sexual Partner] : no infected sexual partner [IV Drug Use] : no IV drug use [Tattoo] : no tattoos [Body Piercing] : no body piercing [Hemodialysis] : no hemodialysis [Transfusion before 1992] : no transfusion before 1992 [Transplant before 1992] : no transplant before 1992 [Incarceration] : no incarceration [Alcohol Abuse] : no alcohol abuse [Autoimmune Disorder] : no autoimmune disorder [Household Contact to HBV] : no household contact to HBV [Travel to Endemic Area] : no travel to an endemic area [Occupational Exposure] : no occupational exposure [Cocaine Use] : no cocaine use [Wt Gain ___ Lbs] : no recent weight gain [Wt Loss ___ Lbs] : no recent weight loss [de-identified] : - 8/5/22: Dong well, regained 10 lbs. Weight 172 lbs, BMI 28 unchanged. Clopidogrel stopped, ASA started by vascular surgery. EGD and colonoscopy not done. \par Labs 7/25: LFTs nl, AFP 2.1 ng/mL, HbA1c 6.1%.\par \par - 5/6/22: returns after bloodwork and CT in January. LFTs normal, AFP 2.2 (was never elevated).\par \par - 1/21/22: returns after resection of HCC. Tumor board presentation on 1/20/22 showed positive margin. Per Dr. Mcgowan's note, intra-operative impression was possible macrovascular portal vein invasion and staple line excised showing intravascular tumor (ie - NOT parenchymal reseciton margin). Feels well, denies abdominal pain except mild when he coughs or sneezes. Lost 10 lbs.\par Exam: abdomen soft, nontender, well-healed surgical scar. No edema.\par \par - 5/3/31: returns after hospitalization at [facility] 3/25 b/o SMA dissection, saw MARIA INES Messina after discharge, now on clopidogrel. Back pain has resolved.\par \par - 2/4/21: returns after bloodwork. MRI done at [facility] report not available. Had hypotension in December, , but no hematochezia or melena. Feels good, is active. Has not lost weight, BMI 28.2\par \par - 10/19/20: returns after US showed 1 cm liver lesion and MRI showed indeterminate lesion. Doing well. GERD controlled with omeprazole. \par \par - 7/20/20: returns after labs in January. Tolerated carvedilol 25 mg bid, is very active gardening, makes his own wine. Takes tenofovir.\par - 11/4/20: return visit\par - 6/3/19: here after EGD which showed large esophageal varices. Tolerating carvedilol 12.5 mg bid - I switched from nadolol b/o bradycardia on sub-optimal dose. Today HR in the 50s as always. Feels very energetic - built the handrails of stairs and porch around his house in 3 days, but has longstanding sleep problems at night. \par \par - 7/15/19: doing well, tolerating carvedilol 25 mg bid, but HR 49 and BP sometimes 90s at home - got dizzy at home after getting up quickly; also taking omeprazole for GERD seen on EGD 5/2019.  CT 2/2019 report reviewed with patient.\par \par - initial visit  2/5/19: Mr. KNOX is a 63 year old man with chronic hepatitis B (dx. 2004), on Viread since diagnosis, compensated cirrhosis, large varices that never bled, on carvedilol, GERD, overweight BMI 27, 176 lbs. \par \par weight: never signif. heavier than now - overweight. Alcohol: social use. Never more than 1 drink per day. SHx: born and raised in [facility], lived in [place] after age 28. Wife [place].\par \par Workup:\par - 5/3/22 MRI abdomen: cirrhosis, s/p R hepatectomy, no suspicious liver mass. Bilat. hypervascular foci, likely shunts. Hemangioma segemtn 4, sub-cm, stable since 2016. CCY. Atherosclerosis, chronic 1.3 cm SMA dilatation, unchanged.\par - 1/19/22 CT abdomen: R lobe postoperative changes after partial hepatectomy. No evidence of residual tumor. Patent portal veins.  Cirrhosis. Peripheral hypervascular foci R lobe and L lobe - - suggestive of shunts. Spleen normal. No ascites. Atherosclerosis. Persistent SMA dilation 1.5 cm without dissection, unchanged.\par - 3/16/21 CTA abdomen: liver normal, chronic dissection SMA with persistent dilatation 1.6 cm.\par - 12/28/21 pathology segment 6 resection, GB: HCC, small vessel invation, moderately differentiated, cirrhosis, steatosis 30%.\par - 10/7/20 MRI ([facility]): 6 mm liver lesion inferior R lobe seg 6/7, T2 hyperintense, no definitive arterial enhancement; motion artefact. No overt cirrhotic features. Recommend MRI after 4-6 mo. Moderate fatty replacement-marbling throughout pancreas.\par - 9/25/20 US abdomen: cirrhosis, 1 cm liver lesion indeterminate.\par - 3/17/20 US abdomen: mild hepatic steatosis. No lesion. GB normal.\par - 9/18/19 MRI ([facility]): mildly cirrhotic liver, sub-cm lesions likely cysts. Spleen normal. Stable SMA dissection. Normal GB. 1.1 cm ivanna hepatitis T2 hyperintense and enhancing lymph node. \par - 2/25/19 CT abdomen wwo (scanned): cirrhosis, small probable hemangioma seg IVb. SMA dissection several cm. Atherosclerotic changes. No comparison possible.\par - 5/22/19 EGD: medium-sized varices, not banded. LA-grade C reflux esophagitis. Two gastric inlet patches in the upper esophagus. Mild erosive gastritis, biopsied. Started Omeprazole 40 mg/d. Given bradycardia, switched nadolol 20 mg/d to carvedilol - start 6.25 mg bid, increase to 12.5 mg bid after 3 days if tolerated. Path: nonspecific gastritis, H. pylori negative.\par - 11/2016 MRI abdomen: Cirrhosis. 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 The remaining vessels are patent. No significant varices. IMPRESSION: Cirrhosis. No evidence of hepatoma\par - Colonoscopy: normal in 2011

## 2022-08-05 NOTE — ASSESSMENT
[FreeTextEntry1] : Mr. KNOX is a 66 year old man, orig. from [facility], with chronic hepatitis B, LASSITER, and HCC.\par \par - compensated cirrhosis, normal MELD labs, PLT < 150. Due to LASSITER and chronic hepatitis B\par   - medium-sized esophageal varices (EGD 5/22/19) that never bled, on carvedilol 12.5 mg bid - tolerating now after initial dizziness when getting up\par - chronic hepatitis B diagnosed in 2005, on Viread since diagnosis, negative viral load\par - LASSITER - steatosis 30% seen on resected liver 12/2021; intermittently mildly elevated AST < 50 U/L\par - HCC, init. 2.2 cm lesion segment 6 in 9/2021, s/p resection 12/28/2021 by Dr. Mcgowan, margins positive. AFP was never elevated. Saw oncology, Dr. Link.\par - LFTs normal\par - sub-cm hepatic cysts\par - glucose intolerance\par - overweight, BMI 26\par \par - SMA dissection 3/2021 - on clopidogrel\par - GERD, LA-grade C reflux esophagitis (EGD 5/2019): takes omeprazole.\par - colon cancer screening: will schedule\par \par Plan:\par - HCC: f/u MRI planned by Dr. Mcgowan for November. he will see him afterwardws\par - varices, now lower BP: continue carvedilol 12.5 mg bid, repeat EGD\par - HBV: continue tenofovir\par - GERD: continue omeprazole for now.\par - NAFLD/LASSITER: weight loss and exercise. Continue Vit E 800 IU/mL \par - colonoscopy: schedule, together with EGD\par - return in 3 months after repeat bloodwork\par

## 2022-08-31 ENCOUNTER — APPOINTMENT (OUTPATIENT)
Dept: PHARMACY | Facility: CLINIC | Age: 67
End: 2022-08-31

## 2022-08-31 PROCEDURE — V5257H: CUSTOM | Mod: LT

## 2022-09-16 ENCOUNTER — APPOINTMENT (OUTPATIENT)
Dept: PHARMACY | Facility: CLINIC | Age: 67
End: 2022-09-16

## 2022-09-16 PROCEDURE — V5299A: CUSTOM | Mod: NC

## 2022-11-01 ENCOUNTER — NON-APPOINTMENT (OUTPATIENT)
Age: 67
End: 2022-11-01

## 2022-11-03 ENCOUNTER — NON-APPOINTMENT (OUTPATIENT)
Age: 67
End: 2022-11-03

## 2022-11-07 ENCOUNTER — OUTPATIENT (OUTPATIENT)
Dept: OUTPATIENT SERVICES | Facility: HOSPITAL | Age: 67
LOS: 1 days | End: 2022-11-07
Payer: COMMERCIAL

## 2022-11-07 ENCOUNTER — APPOINTMENT (OUTPATIENT)
Dept: CT IMAGING | Facility: CLINIC | Age: 67
End: 2022-11-07

## 2022-11-07 DIAGNOSIS — Z98.890 OTHER SPECIFIED POSTPROCEDURAL STATES: Chronic | ICD-10-CM

## 2022-11-07 DIAGNOSIS — C22.0 LIVER CELL CARCINOMA: ICD-10-CM

## 2022-11-07 LAB
AFP-TM SERPL-MCNC: 2.3 NG/ML
ALBUMIN SERPL ELPH-MCNC: 4.6 G/DL
ALP BLD-CCNC: 58 U/L
ALT SERPL-CCNC: 32 U/L
ANION GAP SERPL CALC-SCNC: 11 MMOL/L
AST SERPL-CCNC: 24 U/L
BASOPHILS # BLD AUTO: 0.02 K/UL
BASOPHILS NFR BLD AUTO: 0.4 %
BILIRUB SERPL-MCNC: 1 MG/DL
BUN SERPL-MCNC: 18 MG/DL
CALCIUM SERPL-MCNC: 9.9 MG/DL
CHLORIDE SERPL-SCNC: 104 MMOL/L
CHOLEST SERPL-MCNC: 209 MG/DL
CO2 SERPL-SCNC: 25 MMOL/L
CREAT SERPL-MCNC: 1.09 MG/DL
EGFR: 73 ML/MIN/1.73M2
EOSINOPHIL # BLD AUTO: 0.19 K/UL
EOSINOPHIL NFR BLD AUTO: 4.1 %
ESTIMATED AVERAGE GLUCOSE: 123 MG/DL
GLUCOSE SERPL-MCNC: 109 MG/DL
HBA1C MFR BLD HPLC: 5.9 %
HCT VFR BLD CALC: 46 %
HDLC SERPL-MCNC: 40 MG/DL
HGB BLD-MCNC: 15.3 G/DL
IMM GRANULOCYTES NFR BLD AUTO: 0.2 %
INR PPP: 1.12 RATIO
LDLC SERPL CALC-MCNC: 140 MG/DL
LYMPHOCYTES # BLD AUTO: 1.62 K/UL
LYMPHOCYTES NFR BLD AUTO: 35.1 %
MAN DIFF?: NORMAL
MCHC RBC-ENTMCNC: 29.3 PG
MCHC RBC-ENTMCNC: 33.3 GM/DL
MCV RBC AUTO: 88 FL
MONOCYTES # BLD AUTO: 0.34 K/UL
MONOCYTES NFR BLD AUTO: 7.4 %
NEUTROPHILS # BLD AUTO: 2.43 K/UL
NEUTROPHILS NFR BLD AUTO: 52.8 %
NONHDLC SERPL-MCNC: 169 MG/DL
PLATELET # BLD AUTO: 118 K/UL
POTASSIUM SERPL-SCNC: 4.8 MMOL/L
PROT SERPL-MCNC: 6.8 G/DL
PT BLD: 13.2 SEC
RBC # BLD: 5.23 M/UL
RBC # FLD: 13.6 %
SODIUM SERPL-SCNC: 140 MMOL/L
TRIGL SERPL-MCNC: 146 MG/DL
WBC # FLD AUTO: 4.61 K/UL

## 2022-11-07 PROCEDURE — 74170 CT ABD WO CNTRST FLWD CNTRST: CPT

## 2022-11-07 PROCEDURE — 74170 CT ABD WO CNTRST FLWD CNTRST: CPT | Mod: 26

## 2022-11-08 ENCOUNTER — NON-APPOINTMENT (OUTPATIENT)
Age: 67
End: 2022-11-08

## 2022-11-09 ENCOUNTER — APPOINTMENT (OUTPATIENT)
Dept: INTERNAL MEDICINE | Facility: CLINIC | Age: 67
End: 2022-11-09

## 2022-11-09 VITALS
HEIGHT: 65 IN | DIASTOLIC BLOOD PRESSURE: 80 MMHG | BODY MASS INDEX: 30.32 KG/M2 | WEIGHT: 182 LBS | HEART RATE: 52 BPM | OXYGEN SATURATION: 97 % | SYSTOLIC BLOOD PRESSURE: 116 MMHG

## 2022-11-09 DIAGNOSIS — R00.1 BRADYCARDIA, UNSPECIFIED: ICD-10-CM

## 2022-11-09 PROCEDURE — 99214 OFFICE O/P EST MOD 30 MIN: CPT | Mod: 25

## 2022-11-09 PROCEDURE — 93000 ELECTROCARDIOGRAM COMPLETE: CPT

## 2022-11-09 RX ORDER — CLOPIDOGREL BISULFATE 75 MG/1
75 TABLET, FILM COATED ORAL DAILY
Qty: 90 | Refills: 3 | Status: DISCONTINUED | COMMUNITY
Start: 2021-04-27 | End: 2022-11-09

## 2022-11-09 NOTE — ASSESSMENT
[FreeTextEntry1] : 1.  Sinus bradycardia with severe episode in the setting of increased vagal tone as he was nauseous and constipated.  That has since resolved but at rest his HR is 48 and he has yet to take his meds.  Will decrease the carvedilol to 6.25 mg 2x/day.  He does have a history of esophageal varices and the dissection of the SMA so keeping his heart rate on the slower side is desirable.  He does get occasional lightheadedness when he gets up from a chair too quickly but no known syncope and is tolerated the bradycardia for quite some time.\par 2.  Elevated blood pressure at urgent care but that was in the setting of upper abdominal left-sided chest pain and everything else that was going on.  His blood pressure today is beautiful.  We will continue current management.\par 3.  Complaints of nosebleeds likely due to it now being winter and getting inside with low ambient humidity.  Did have an episode when he was increased as well.  Will recommend nasal saline and he can follow-up with his ENT to see if cautery is needed.\par 4.  F/U in 1 month

## 2022-11-09 NOTE — HISTORY OF PRESENT ILLNESS
[Spouse] : spouse [FreeTextEntry8] : 67-year-old male with a history of hypertension, chronic hep B, cirrhosis, dissection of mesenteric artery, esophageal varices and resection of a small hepatocellular carcinoma here after being seen in urgent care for complaints of upper abdominal and left-sided chest pain.  History was difficult to obtain from the patient and his wife.  note read and reviewed.  His wife was acting as his .  From what I can ascertain patient was found to have a heart rate of 41 at home on either a BP monitor or a pulse ox and they went to urgent care because of that and the abdominal/left-sided chest discomfort.  He had been suffering with constipation and had some nauseousness for the whole day.  He is currently maintained on carvedilol 12.5 mg twice daily because of the varices and the SMA dissection.  He had been on Plavix but that was DC'd by his vascular surgeon and he is taking a baby aspirin daily.  He has also been getting nosebleeds 1 of which was big.\par \par While at urgent care he was also found to have an elevated blood pressure and because of the heart rate of 41 he was told to go to the emergency room.  He went but left after waiting 6 hours and was never seen.  He is here for follow-up.  He reports he is feeling better, the abdominal and upper chest pain resolved after having a bowel movement as his wife gave him some medication to help with that.  No further nauseousness.

## 2022-11-10 ENCOUNTER — APPOINTMENT (OUTPATIENT)
Dept: TRANSPLANT | Facility: CLINIC | Age: 67
End: 2022-11-10

## 2022-11-10 VITALS
HEIGHT: 65 IN | DIASTOLIC BLOOD PRESSURE: 85 MMHG | RESPIRATION RATE: 18 BRPM | BODY MASS INDEX: 29.99 KG/M2 | OXYGEN SATURATION: 96 % | WEIGHT: 180 LBS | SYSTOLIC BLOOD PRESSURE: 122 MMHG | HEART RATE: 49 BPM

## 2022-11-10 PROCEDURE — 99213 OFFICE O/P EST LOW 20 MIN: CPT

## 2022-11-10 NOTE — HISTORY OF PRESENT ILLNESS
[de-identified] : 67yo man post segment 6 resection for HCC liver 12/28/21\par has recovered well, without issue\par \par Interval Events (11/10/22)\par -recent ED visit due to high BP and low HR, self resolved, awaiting cardiology appt\par -otherwise doing well\par -recent travel to [place]\par -CT 11/7/22:  no recurrent disease\par AFP - normal\par \par \par path - mod diff, pT2, *"parenchymal resection margin involved by carcinoma", \par - pathology to be clarified as intraoperatively, possible macrovascular PV invasion and staple line excised showing intravascular tumor (ie - NOT parenchymal reseciton margin)\par \par \par Preop workup:\par \par MRI 9/24/21 - cirrhotic morphology, mild hepatic steatosis, 2.2x1.7cm seg 6 lesion with faint arterial enhancenment, PV washout and pseudocapsule, LIRADS-5 lesion\par AFP 2.5\par no recent fevers/weight gain\par \par b/g chronic HBV (dx 2004), on tenofovir\par compensated cirrhosis with non-bleeding large varices on carvedilol, endoscopy 5/22/19\par etoh - social, 1 drink/d, makes own wine\par \par PMHx\par 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 \par

## 2022-11-10 NOTE — ASSESSMENT
[FreeTextEntry1] : 68yo man for f/u visit post HCC resection 1/21/22\par \par -surveillence imaging: no evidence of recurrence\par \par Plan\par further surveillence imaging in 6 months (MRI)\par -f/u after

## 2022-11-10 NOTE — PHYSICAL EXAM
[Normal] : well developed, well nourished, in no acute distress [de-identified] : R subcostal incision healed

## 2022-11-15 ENCOUNTER — APPOINTMENT (OUTPATIENT)
Dept: HEPATOLOGY | Facility: CLINIC | Age: 67
End: 2022-11-15

## 2022-11-21 NOTE — DISCHARGE NOTE NURSING/CASE MANAGEMENT/SOCIAL WORK - INFLUENZA IMMUNIZATION DATE (APPROXIMATE):
IOP prev 13, stable today and all prev visits here. If IOP elevates in the future increase Dorz/av dosage to bid. 28-Sep-2021

## 2022-12-12 ENCOUNTER — OUTPATIENT (OUTPATIENT)
Dept: OUTPATIENT SERVICES | Facility: HOSPITAL | Age: 67
LOS: 1 days | End: 2022-12-12
Payer: COMMERCIAL

## 2022-12-12 DIAGNOSIS — Z01.818 ENCOUNTER FOR OTHER PREPROCEDURAL EXAMINATION: ICD-10-CM

## 2022-12-12 DIAGNOSIS — Z98.890 OTHER SPECIFIED POSTPROCEDURAL STATES: Chronic | ICD-10-CM

## 2022-12-12 DIAGNOSIS — Z11.52 ENCOUNTER FOR SCREENING FOR COVID-19: ICD-10-CM

## 2022-12-12 LAB — SARS-COV-2 RNA SPEC QL NAA+PROBE: SIGNIFICANT CHANGE UP

## 2022-12-12 PROCEDURE — U0005: CPT

## 2022-12-12 PROCEDURE — U0003: CPT

## 2022-12-12 PROCEDURE — C9803: CPT

## 2022-12-14 ENCOUNTER — APPOINTMENT (OUTPATIENT)
Dept: HEPATOLOGY | Facility: HOSPITAL | Age: 67
End: 2022-12-14

## 2022-12-14 ENCOUNTER — OUTPATIENT (OUTPATIENT)
Dept: OUTPATIENT SERVICES | Facility: HOSPITAL | Age: 67
LOS: 1 days | Discharge: HOME | End: 2022-12-14

## 2022-12-14 ENCOUNTER — RESULT REVIEW (OUTPATIENT)
Age: 67
End: 2022-12-14

## 2022-12-14 VITALS
WEIGHT: 177.03 LBS | DIASTOLIC BLOOD PRESSURE: 103 MMHG | RESPIRATION RATE: 19 BRPM | TEMPERATURE: 98 F | OXYGEN SATURATION: 95 % | HEART RATE: 64 BPM | SYSTOLIC BLOOD PRESSURE: 123 MMHG | HEIGHT: 65 IN

## 2022-12-14 VITALS
HEART RATE: 53 BPM | RESPIRATION RATE: 19 BRPM | SYSTOLIC BLOOD PRESSURE: 131 MMHG | DIASTOLIC BLOOD PRESSURE: 91 MMHG | OXYGEN SATURATION: 94 %

## 2022-12-14 DIAGNOSIS — Z98.890 OTHER SPECIFIED POSTPROCEDURAL STATES: Chronic | ICD-10-CM

## 2022-12-14 DIAGNOSIS — K74.60 UNSPECIFIED CIRRHOSIS OF LIVER: ICD-10-CM

## 2022-12-14 PROCEDURE — 45378 DIAGNOSTIC COLONOSCOPY: CPT

## 2022-12-14 PROCEDURE — 88305 TISSUE EXAM BY PATHOLOGIST: CPT | Mod: 26

## 2022-12-14 PROCEDURE — 43244 EGD VARICES LIGATION: CPT

## 2022-12-14 DEVICE — LIGATOR MULTIBAND 9.5-11.5MM: Type: IMPLANTABLE DEVICE | Status: FUNCTIONAL

## 2022-12-14 NOTE — ASU PATIENT PROFILE, ADULT - FALL HARM RISK - UNIVERSAL INTERVENTIONS
Bed in lowest position, wheels locked, appropriate side rails in place/Call bell, personal items and telephone in reach/Instruct patient to call for assistance before getting out of bed or chair/Non-slip footwear when patient is out of bed/[place] to call system/Physically safe environment - no spills, clutter or unnecessary equipment/Purposeful Proactive Rounding/Room/bathroom lighting operational, light cord in reach

## 2022-12-14 NOTE — ASU PREOP CHECKLIST - RESPIRATORY RATE (BREATHS/MIN)
Implemented All Fall Risk Interventions:  [place] to call system. Call bell, personal items and telephone within reach. Instruct patient to call for assistance. Room bathroom lighting operational. Non-slip footwear when patient is off stretcher. Physically safe environment: no spills, clutter or unnecessary equipment. Stretcher in lowest position, wheels locked, appropriate side rails in place. Provide visual cue, wrist band, yellow gown, etc. Monitor gait and stability. Monitor for mental status changes and reorient to person, place, and time. Review medications for side effects contributing to fall risk. Reinforce activity limits and safety measures with patient and family. 19

## 2022-12-14 NOTE — ASU PATIENT PROFILE, ADULT - NSICDXPASTMEDICALHX_GEN_ALL_CORE_FT
PAST MEDICAL HISTORY:  H/O esophageal varices non-bleeding, on Carvedilol    H/O sinus bradycardia 40's-50's normal HR    HBV (hepatitis B virus) infection on tenofovir    HCC (hepatocellular carcinoma)     Hypertension     Language barrier speaks Lao    Liver disease     Nonalcoholic fatty liver disease without nonalcoholic steatohepatitis (LASSITER)     Pre-diabetes

## 2022-12-16 LAB — SURGICAL PATHOLOGY STUDY: SIGNIFICANT CHANGE UP

## 2022-12-16 NOTE — ASSESSMENT
[FreeTextEntry1] : - 12/14/22 Colonoscopy: good prep except for part of cecum under undigested food. No polyps. Single diverticulum. Small internal and external hemorrhoids. Repeat after 10 yrs discussed with the pt.

## 2023-01-01 NOTE — ED CDU PROVIDER DISPOSITION NOTE - NSFOLLOWUPINSTRUCTIONS_ED_ALL_ED_FT
1. Follow up with your PMD within 48-72 hours.   You may schedule appointment with Cardiology clinic this week by calling [phone]  2. Show copies of your reports given to you. Recommend Aspirin 81mg over the counter daily until further evaluation.  Take all of your other medications as previously prescribed.   3. Worsening or continued chest pain, shortness of breath, weakness, return to ED.
Term [place] Vaginal Delivery (>/= 37 weeks)

## 2023-01-28 ENCOUNTER — NON-APPOINTMENT (OUTPATIENT)
Age: 68
End: 2023-01-28

## 2023-02-06 ENCOUNTER — APPOINTMENT (OUTPATIENT)
Dept: HEPATOLOGY | Facility: CLINIC | Age: 68
End: 2023-02-06

## 2023-02-08 ENCOUNTER — OUTPATIENT (OUTPATIENT)
Dept: OUTPATIENT SERVICES | Facility: HOSPITAL | Age: 68
LOS: 1 days | Discharge: HOME | End: 2023-02-08
Payer: MEDICARE

## 2023-02-08 ENCOUNTER — RESULT REVIEW (OUTPATIENT)
Age: 68
End: 2023-02-08

## 2023-02-08 ENCOUNTER — APPOINTMENT (OUTPATIENT)
Dept: HEPATOLOGY | Facility: HOSPITAL | Age: 68
End: 2023-02-08

## 2023-02-08 VITALS
SYSTOLIC BLOOD PRESSURE: 132 MMHG | HEART RATE: 63 BPM | OXYGEN SATURATION: 96 % | TEMPERATURE: 97 F | RESPIRATION RATE: 18 BRPM | DIASTOLIC BLOOD PRESSURE: 92 MMHG

## 2023-02-08 VITALS
RESPIRATION RATE: 16 BRPM | OXYGEN SATURATION: 99 % | HEART RATE: 61 BPM | SYSTOLIC BLOOD PRESSURE: 139 MMHG | DIASTOLIC BLOOD PRESSURE: 90 MMHG

## 2023-02-08 DIAGNOSIS — Z98.890 OTHER SPECIFIED POSTPROCEDURAL STATES: Chronic | ICD-10-CM

## 2023-02-08 DIAGNOSIS — K74.60 UNSPECIFIED CIRRHOSIS OF LIVER: ICD-10-CM

## 2023-02-08 LAB — SARS-COV-2 N GENE NPH QL NAA+PROBE: NOT DETECTED

## 2023-02-08 PROCEDURE — 43244 EGD VARICES LIGATION: CPT

## 2023-02-08 PROCEDURE — 88305 TISSUE EXAM BY PATHOLOGIST: CPT | Mod: 26

## 2023-02-08 DEVICE — LIGATOR MULTIBAND 9.5-11.5MM: Type: IMPLANTABLE DEVICE | Status: FUNCTIONAL

## 2023-02-08 NOTE — PROCEDURE
[FreeTextEntry1] : - 2/8/23 EGD: small EV, scarring from prior treatment, 4 bands placed. Gastric erosions, biopsied. Duodenum normal. Continue carvedilol and PPI. Repeat depending on clinical course.

## 2023-02-08 NOTE — ASU PATIENT PROFILE, ADULT - NSICDXPASTMEDICALHX_GEN_ALL_CORE_FT
PAST MEDICAL HISTORY:  H/O esophageal varices non-bleeding, on Carvedilol    H/O sinus bradycardia 40's-50's normal HR    HBV (hepatitis B virus) infection on tenofovir    HCC (hepatocellular carcinoma)     Hypertension     Language barrier speaks Amharic    Liver disease     Nonalcoholic fatty liver disease without nonalcoholic steatohepatitis (LASSITER)     Pre-diabetes

## 2023-02-08 NOTE — ASU PATIENT PROFILE, ADULT - FALL HARM RISK - UNIVERSAL INTERVENTIONS
Bed in lowest position, wheels locked, appropriate side rails in place/Call bell, personal items and telephone in reach/Instruct patient to call for assistance before getting out of bed or chair/Non-slip footwear when patient is out of bed/[place] to call system/Physically safe environment - no spills, clutter or unnecessary equipment/Purposeful Proactive Rounding/Room/bathroom lighting operational, light cord in reach

## 2023-02-13 LAB — SURGICAL PATHOLOGY STUDY: SIGNIFICANT CHANGE UP

## 2023-04-03 ENCOUNTER — OUTPATIENT (OUTPATIENT)
Dept: OUTPATIENT SERVICES | Facility: HOSPITAL | Age: 68
LOS: 1 days | End: 2023-04-03
Payer: COMMERCIAL

## 2023-04-03 ENCOUNTER — APPOINTMENT (OUTPATIENT)
Dept: MRI IMAGING | Facility: CLINIC | Age: 68
End: 2023-04-03
Payer: MEDICARE

## 2023-04-03 DIAGNOSIS — Z98.890 OTHER SPECIFIED POSTPROCEDURAL STATES: Chronic | ICD-10-CM

## 2023-04-03 DIAGNOSIS — Z00.8 ENCOUNTER FOR OTHER GENERAL EXAMINATION: ICD-10-CM

## 2023-04-03 DIAGNOSIS — C22.0 LIVER CELL CARCINOMA: ICD-10-CM

## 2023-04-03 PROCEDURE — 74183 MRI ABD W/O CNTR FLWD CNTR: CPT

## 2023-04-03 PROCEDURE — A9585: CPT

## 2023-04-03 PROCEDURE — 74183 MRI ABD W/O CNTR FLWD CNTR: CPT | Mod: 26

## 2023-05-18 ENCOUNTER — APPOINTMENT (OUTPATIENT)
Dept: TRANSPLANT | Facility: CLINIC | Age: 68
End: 2023-05-18
Payer: MEDICARE

## 2023-05-18 VITALS
SYSTOLIC BLOOD PRESSURE: 110 MMHG | RESPIRATION RATE: 14 BRPM | OXYGEN SATURATION: 98 % | BODY MASS INDEX: 29.66 KG/M2 | HEIGHT: 65 IN | HEART RATE: 67 BPM | WEIGHT: 178 LBS | TEMPERATURE: 207.32 F | DIASTOLIC BLOOD PRESSURE: 74 MMHG

## 2023-05-18 PROCEDURE — 99213 OFFICE O/P EST LOW 20 MIN: CPT

## 2023-05-19 LAB
AFP-TM SERPL-MCNC: 3.2 NG/ML
ALBUMIN SERPL ELPH-MCNC: 5 G/DL
ALP BLD-CCNC: 63 U/L
ALT SERPL-CCNC: 23 U/L
ANION GAP SERPL CALC-SCNC: 14 MMOL/L
AST SERPL-CCNC: 24 U/L
BILIRUB SERPL-MCNC: 0.6 MG/DL
BUN SERPL-MCNC: 19 MG/DL
CALCIUM SERPL-MCNC: 10.3 MG/DL
CHLORIDE SERPL-SCNC: 105 MMOL/L
CO2 SERPL-SCNC: 24 MMOL/L
CREAT SERPL-MCNC: 1.02 MG/DL
EGFR: 80 ML/MIN/1.73M2
ESTIMATED AVERAGE GLUCOSE: 128 MG/DL
GLUCOSE SERPL-MCNC: 97 MG/DL
HBA1C MFR BLD HPLC: 6.1 %
INR PPP: 1.09 RATIO
POTASSIUM SERPL-SCNC: 4.9 MMOL/L
PROT SERPL-MCNC: 7.2 G/DL
PT BLD: 12.8 SEC
SODIUM SERPL-SCNC: 143 MMOL/L

## 2023-05-22 ENCOUNTER — FORM ENCOUNTER (OUTPATIENT)
Age: 68
End: 2023-05-22

## 2023-05-23 ENCOUNTER — NON-APPOINTMENT (OUTPATIENT)
Age: 68
End: 2023-05-23

## 2023-05-23 ENCOUNTER — APPOINTMENT (OUTPATIENT)
Dept: HEPATOLOGY | Facility: CLINIC | Age: 68
End: 2023-05-23
Payer: MEDICARE

## 2023-05-23 VITALS
DIASTOLIC BLOOD PRESSURE: 79 MMHG | RESPIRATION RATE: 15 BRPM | BODY MASS INDEX: 29.66 KG/M2 | HEIGHT: 65 IN | HEART RATE: 56 BPM | OXYGEN SATURATION: 97 % | WEIGHT: 178 LBS | TEMPERATURE: 98.1 F | SYSTOLIC BLOOD PRESSURE: 124 MMHG

## 2023-05-23 DIAGNOSIS — R73.02 IMPAIRED GLUCOSE TOLERANCE (ORAL): ICD-10-CM

## 2023-05-23 PROCEDURE — 99214 OFFICE O/P EST MOD 30 MIN: CPT

## 2023-05-23 NOTE — ASSESSMENT
[FreeTextEntry1] : Mr. KNOX is a 66 year old man, orig. from [facility], with chronic hepatitis B, LASSITER, and HCC.\par \par - compensated cirrhosis, normal MELD labs, PLT < 150. Due to LASSITER and chronic hepatitis B\par   - medium-sized esophageal varices (EGD 5/22/19) that never bled, on carvedilol 12.5 mg bid - tolerating now after initial dizziness when getting up\par - chronic hepatitis B diagnosed in 2005, on Viread since diagnosis, negative viral load\par - LASSITER - steatosis 30% seen on resected liver 12/2021; intermittently mildly elevated AST < 50 U/L\par - HCC, init. 2.2 cm lesion segment 6 in 9/2021, s/p resection 12/28/2021 by Dr. Mcgowan, margins positive. AFP was never elevated. Saw oncology, Dr. Link.\par - LFTs normal\par - sub-cm hepatic cysts\par - glucose intolerance\par - overweight, BMI 26\par \par - SMA dissection 3/2021 - on clopidogrel\par - GERD, LA-grade C reflux esophagitis (EGD 5/2019): takes omeprazole.\par - colon cancer screening: no polyps 12/2023 - repeat after 10 yrs \par \par Plan:\par - HCC: f/u MRI planned by Dr. Jacome\par - varices, now lower BP: continue carvedilol 12.5 mg bid\par - HBV: continue tenofovir\par - GERD: resume omeprazole - refilled\par - NAFLD/LASSITER: weight loss and exercise. Continue Vit E 800 IU/mL \par - return in 6 months after repeat bloodwork\par

## 2023-05-25 LAB
HEPB DNA PCR INT: NOT DETECTED
HEPB DNA PCR LOG: NOT DETECTED LOGIU/ML

## 2023-05-25 NOTE — PHYSICAL EXAM
[Abdominal Masses] : No abdominal masses [Abdomen Tenderness] : ~T ~M No abdominal tenderness [de-identified] : well developed, well nourished, in no acute distress.  [de-identified] : Right subcostal incision healed.

## 2023-05-25 NOTE — REASON FOR VISIT
[Follow-Up: _____] : a [unfilled] follow-up visit [FreeTextEntry1] : 66 y.o. male presents for follow up for HCC liver resection on 12/28/21. Has recovered well, without issue.

## 2023-05-25 NOTE — PLAN
[FreeTextEntry1] : 67 y.o. man for f/u visit post HCC resection 12/28/21.\par \par surveillance imaging: no evidence of recurrence.\par \par \par Plan:\par -Further surveillance imaging in 6 months (MRI). F/U in clinic after.\par -Patient following with hepatology.

## 2023-05-25 NOTE — END OF VISIT
[FreeTextEntry3] : imaging reviewed.  no e/o recurrence.  continue surveillance.   plan d/w patient and NP.  all questions answered.

## 2023-05-25 NOTE — HISTORY OF PRESENT ILLNESS
[de-identified] : 66 y.o. 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 Status post partial right hepatectomy with no evidence of recurrent mass. \par \par path - mod diff, pT2, *"parenchymal resection margin involved by carcinoma", \par - pathology to be clarified as intraoperatively, possible macrovascular PV invasion and staple line excised showing intravascular tumor (ie - NOT parenchymal reseciton margin)\par \par Labs 5/18/23:\par AFP 3.2\par PLT: 130 \par INR: 1.09 \par AST 24    ALT 23    Total Bili: 0.6\par \par \par \par Preop workup:\par \par MRI 9/24/21 - cirrhotic morphology, mild hepatic steatosis, 2.2x1.7cm seg 6 lesion with faint arterial enhancenment, PV washout and pseudocapsule, LIRADS-5 lesion\par AFP 2.5\par no recent fevers/weight gain\par \par b/g chronic HBV (dx 2004), on tenofovir\par compensated cirrhosis with non-bleeding large varices on carvedilol, endoscopy 5/22/19\par etoh - social, 1 drink/d, makes own wine\par \par PMHx\par 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\par Preop workup:\par \par MRI 9/24/21 - cirrhotic morphology, mild hepatic steatosis, 2.2x1.7cm seg 6 lesion with faint arterial enhancenment, PV washout and pseudocapsule, LIRADS-5 lesion\par AFP 2.5\par no recent fevers/weight gain\par \par b/g chronic HBV (dx 2004), on tenofovir\par compensated cirrhosis with non-bleeding large varices on carvedilol, endoscopy 5/22/19\par etoh - social, 1 drink/d, makes own wine\par \par PMHx\par 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

## 2023-06-26 ENCOUNTER — APPOINTMENT (OUTPATIENT)
Dept: INTERNAL MEDICINE | Facility: CLINIC | Age: 68
End: 2023-06-26
Payer: MEDICARE

## 2023-06-26 PROCEDURE — G0439: CPT

## 2023-06-26 NOTE — HISTORY OF PRESENT ILLNESS
[FreeTextEntry1] : Mr. KNOX is here for an annual physical examination and assessment.\par  [de-identified] : He generally feels well with no specific complaints. His recent health has been good.\par Rare episodes of anxiety.  Uses Valium several time per year.  Asks for refill. \par \par Denies headache, dizziness.\par Denies rash, fatigue, fever, weight loss, anorexia.\par Denies cough, wheezing.\par Denies CP, SOB, MARTIN, edema, palpitations.\par Denies abdominal pain, N/V/D/C. Denies BRBPR, melena, dysphagia.\par Denies dysuria, frequency, hematuria, hesitancy.\par Denies weakness, numbness, gait instability.\par

## 2023-06-26 NOTE — HEALTH RISK ASSESSMENT
[Excellent] : ~his/her~  mood as  excellent [No] : In the past 12 months have you used drugs other than those required for medical reasons? No [No falls in past year] : Patient reported no falls in the past year [0] : 2) Feeling down, depressed, or hopeless: Not at all (0) [PHQ-2 Negative - No further assessment needed] : PHQ-2 Negative - No further assessment needed [None] : None [With Family] : lives with family [] :  [Feels Safe at Home] : Feels safe at home [Fully functional (bathing, dressing, toileting, transferring, walking, feeding)] : Fully functional (bathing, dressing, toileting, transferring, walking, feeding) [Fully functional (using the telephone, shopping, preparing meals, housekeeping, doing laundry, using] : Fully functional and needs no help or supervision to perform IADLs (using the telephone, shopping, preparing meals, housekeeping, doing laundry, using transportation, managing medications and managing finances) [Reports normal functional visual acuity (ie: able to read med bottle)] : Reports normal functional visual acuity [Smoke Detector] : smoke detector [Seat Belt] :  uses seat belt [Former] : Former [Audit-CScore] : 0 [[id number]] : 0 [Change in mental status noted] : No change in mental status noted [Language] : denies difficulty with language [Behavior] : denies difficulty with behavior [Learning/Retaining New Information] : denies difficulty learning/retaining new information [Handling Complex Tasks] : denies difficulty handling complex tasks [Reasoning] : denies difficulty with reasoning [Spatial Ability and Orientation] : denies difficulty with spatial ability and orientation [Reports changes in hearing] : Reports changes in hearing [Reports changes in vision] : Reports no changes in vision [Reports changes in dental health] : Reports no changes in dental health [de-identified] : recently lost hearing aid

## 2023-06-27 LAB
25(OH)D3 SERPL-MCNC: 55.6 NG/ML
ALBUMIN SERPL ELPH-MCNC: 4.6 G/DL
ALP BLD-CCNC: 62 U/L
ALT SERPL-CCNC: 25 U/L
ANION GAP SERPL CALC-SCNC: 13 MMOL/L
AST SERPL-CCNC: 21 U/L
BILIRUB SERPL-MCNC: 0.8 MG/DL
BUN SERPL-MCNC: 19 MG/DL
CALCIUM SERPL-MCNC: 9.7 MG/DL
CHLORIDE SERPL-SCNC: 104 MMOL/L
CHOLEST SERPL-MCNC: 182 MG/DL
CO2 SERPL-SCNC: 26 MMOL/L
CREAT SERPL-MCNC: 1.12 MG/DL
EGFR: 71 ML/MIN/1.73M2
ESTIMATED AVERAGE GLUCOSE: 137 MG/DL
GLUCOSE SERPL-MCNC: 114 MG/DL
HBA1C MFR BLD HPLC: 6.4 %
HDLC SERPL-MCNC: 42 MG/DL
LDLC SERPL CALC-MCNC: 114 MG/DL
NONHDLC SERPL-MCNC: 140 MG/DL
POTASSIUM SERPL-SCNC: 4.3 MMOL/L
PROT SERPL-MCNC: 7 G/DL
PSA SERPL-MCNC: 1.19 NG/ML
SODIUM SERPL-SCNC: 142 MMOL/L
TRIGL SERPL-MCNC: 132 MG/DL

## 2023-06-27 NOTE — PRE-OP CHECKLIST - SITE MARKED BY ANESTHESIOLOGIST
n/a Render Risk Assessment In Note?: no Detail Level: Simple Additional Notes: Well controlled for years on Soolantra and Doxy 40mg once daily. Patient tolerates medications well with no reported adverse effects.

## 2023-07-30 ENCOUNTER — NON-APPOINTMENT (OUTPATIENT)
Age: 68
End: 2023-07-30

## 2023-09-05 ENCOUNTER — APPOINTMENT (OUTPATIENT)
Dept: VASCULAR SURGERY | Facility: CLINIC | Age: 68
End: 2023-09-05

## 2023-09-11 ENCOUNTER — NON-APPOINTMENT (OUTPATIENT)
Age: 68
End: 2023-09-11

## 2023-09-11 DIAGNOSIS — K29.60 OTHER GASTRITIS W/OUT BLEEDING: ICD-10-CM

## 2023-09-11 RX ORDER — OMEPRAZOLE 40 MG/1
40 CAPSULE, DELAYED RELEASE ORAL
Qty: 90 | Refills: 3 | Status: ACTIVE | COMMUNITY
Start: 1900-01-01 | End: 1900-01-01

## 2023-12-04 ENCOUNTER — APPOINTMENT (OUTPATIENT)
Dept: HEPATOLOGY | Facility: CLINIC | Age: 68
End: 2023-12-04

## 2023-12-04 LAB
AFP-TM SERPL-MCNC: 2.3 NG/ML
ALBUMIN SERPL ELPH-MCNC: 4.5 G/DL
ALP BLD-CCNC: 63 U/L
ALT SERPL-CCNC: 16 U/L
ANION GAP SERPL CALC-SCNC: 11 MMOL/L
AST SERPL-CCNC: 20 U/L
BILIRUB SERPL-MCNC: 0.8 MG/DL
BUN SERPL-MCNC: 18 MG/DL
CALCIUM SERPL-MCNC: 10.4 MG/DL
CANCER AG19-9 SERPL-ACNC: 8 U/ML
CHLORIDE SERPL-SCNC: 101 MMOL/L
CO2 SERPL-SCNC: 26 MMOL/L
CREAT SERPL-MCNC: 1.05 MG/DL
EGFR: 76 ML/MIN/1.73M2
GLUCOSE SERPL-MCNC: 110 MG/DL
HCT VFR BLD CALC: 44.2 %
HGB BLD-MCNC: 14.6 G/DL
INR PPP: 1.03 RATIO
MCHC RBC-ENTMCNC: 28.9 PG
MCHC RBC-ENTMCNC: 33 GM/DL
MCV RBC AUTO: 87.4 FL
PLATELET # BLD AUTO: 117 K/UL
POTASSIUM SERPL-SCNC: 5 MMOL/L
PROT SERPL-MCNC: 6.7 G/DL
PT BLD: 11.6 SEC
RBC # BLD: 5.06 M/UL
RBC # FLD: 13.8 %
SODIUM SERPL-SCNC: 138 MMOL/L
WBC # FLD AUTO: 5.23 K/UL

## 2023-12-06 ENCOUNTER — APPOINTMENT (OUTPATIENT)
Dept: MRI IMAGING | Facility: IMAGING CENTER | Age: 68
End: 2023-12-06
Payer: MEDICARE

## 2023-12-06 ENCOUNTER — OUTPATIENT (OUTPATIENT)
Dept: OUTPATIENT SERVICES | Facility: HOSPITAL | Age: 68
LOS: 1 days | End: 2023-12-06
Payer: MEDICARE

## 2023-12-06 DIAGNOSIS — C22.0 LIVER CELL CARCINOMA: ICD-10-CM

## 2023-12-06 DIAGNOSIS — Z98.890 OTHER SPECIFIED POSTPROCEDURAL STATES: Chronic | ICD-10-CM

## 2023-12-06 PROCEDURE — 74183 MRI ABD W/O CNTR FLWD CNTR: CPT | Mod: 26

## 2023-12-06 PROCEDURE — A9585: CPT

## 2023-12-06 PROCEDURE — 74183 MRI ABD W/O CNTR FLWD CNTR: CPT

## 2023-12-06 NOTE — BRIEF OPERATIVE NOTE - BRIEF OP NOTE DRAINS
Spoke with patient and got her rescheduled for surgery on Jan 25. Adjusted postops accordingly    ----- Message from Jaelyn Cintron sent at 12/6/2023 11:03 AM CST -----    ----- Message -----  From: Renetta Norwood  Sent: 12/6/2023  10:57 AM CST  To: [facility]    Pt calling to moving procedure date  till beginning  of next yr , pt is having procedure today and wont be available to talk this afternoon     Confirmed patient's contact info below:  Contact Name: Vonnie Garces  Phone Number: [phone]         19 Fr ALISA in liver bed

## 2023-12-12 NOTE — HISTORY OF PRESENT ILLNESS
[Health Maintenance] : health maintenance [___ Year(s) Ago] : [unfilled] year(s) ago [Good] : good [Reg. Dental Visits] : He does not have regular dental visits [Vision Problems] : He denies vision problems [Hearing Loss] : He denies hearing loss [Healthy Diet] : He consumes a diverse and healthy diet [Weight Concerns] : He does not have any weight concerns [Regular Exercise] : He exercises regularly [Tobacco Use] : He does not use tobacco [Cigarette ___ppd] : [unfilled] cigarette pack(s) per day [Alcohol Use] : He denies alcohol use [Drug Abuse] : He denies drug use [No Previous PSA Testing] : no previous prostate-specific antigen testing [Colonoscopy Within 10 Years] : a colonoscopy within the past ten years [Reviewed and Updated] : metabolic screening reviewed and updated [Seat Belt] : seat belt [Safe Driving Habits] : safe driving habits [Sunscreen] : sunscreen [Smoke Detector] : smoke detector [Carbon Monoxide Detector] : carbon monoxide detector [Up to Date] : not up to date [de-identified] : 0.5PPD x 20 years [FreeTextEntry9] :  [de-identified] : Pros/cons of PSA screening d/w pt including risk for false positive results requiring possibly unnecessary additional evaluation and risk for false negative results possibly missing a cancer and pt would like PSA screening done. [de-identified] : Last Colonoscopy 2011 and 10 year repeat was advised [de-identified] : 65 yo male with a h/o chronic Hep B with cirrhosis and esophageal varices followed by Hepatology here for his annual wellness visit. He had far left chest/upper abdominal pain.  Went to the hospital and was found to have an SMA dissection and is now seeing Dr. Byrd cardiology and is on plavix.  He was also found on routine surveillance for his hepatitis B to have a new liver lesion and underwent a resection in January that showed hepatocellular carcinoma.  He saw heme-onc who felt that close observation with frequent scanning the first 3 years was merited.  Was offered a clinical trial but declined.  Had an MRI last month that showed no evidence of disease and stable postoperative changes.\par \par He is c/o right wrist pain at the base of the thumb.  He comes in today wearing a wrist support.\par \par Had his COVID vaccine - Feb and March 2021- Moderna.  Needs to get Shingrix.\par \par  [FreeTextEntry1] : \par  done

## 2023-12-14 ENCOUNTER — APPOINTMENT (OUTPATIENT)
Dept: TRANSPLANT | Facility: CLINIC | Age: 68
End: 2023-12-14
Payer: MEDICARE

## 2023-12-14 VITALS
BODY MASS INDEX: 29.66 KG/M2 | DIASTOLIC BLOOD PRESSURE: 96 MMHG | HEART RATE: 58 BPM | WEIGHT: 178 LBS | RESPIRATION RATE: 15 BRPM | SYSTOLIC BLOOD PRESSURE: 156 MMHG | OXYGEN SATURATION: 98 % | TEMPERATURE: 96.3 F | HEIGHT: 65 IN

## 2023-12-14 PROCEDURE — 99213 OFFICE O/P EST LOW 20 MIN: CPT

## 2024-01-02 NOTE — HISTORY OF PRESENT ILLNESS
[de-identified] : 66 y.o. 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 Interval events: MRI completed 4/3/23: Stable examination. Status post partial right hepatectomy with no evidence of recurrent mass. path - mod diff, pT2, *"parenchymal resection margin involved by carcinoma", - pathology to be clarified as intraoperatively, possible macrovascular PV invasion and staple line excised showing intravascular tumor (ie - NOT parenchymal reseciton margin)  Labs 5/18/23: AFP 3.2 PLT: 130 INR: 1.09 AST 24 ALT 23 Total Bili: 0.6  Preop workup: MRI 9/24/21 - cirrhotic morphology, mild hepatic steatosis, 2.2x1.7cm seg 6 lesion with faint arterial enhancenment, PV washout and pseudocapsule, LIRADS-5 lesion AFP 2.5 no recent fevers/weight gain  b/g chronic HBV (dx 2004), on tenofovir  compensated cirrhosis with non-bleeding large varices on carvedilol, endoscopy 5/22/19 etoh - social, 1 drink/d, makes own wine  PMHx 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 [de-identified] : f/u appointment has been well  no symptoms recent travel to [facility]  MRI 12/14/23 -no evidence of tumor recurrence

## 2024-01-02 NOTE — ASSESSMENT
[FreeTextEntry1] : s/p right hepatectomy for HCC 12/28/21 MRI - no evidence of tumor recurrence  Plan for continued f/u with Dr Hair (hepatologist)

## 2024-01-23 ENCOUNTER — APPOINTMENT (OUTPATIENT)
Dept: VASCULAR SURGERY | Facility: CLINIC | Age: 69
End: 2024-01-23
Payer: MEDICARE

## 2024-01-23 VITALS
DIASTOLIC BLOOD PRESSURE: 96 MMHG | BODY MASS INDEX: 29.66 KG/M2 | WEIGHT: 178 LBS | SYSTOLIC BLOOD PRESSURE: 150 MMHG | TEMPERATURE: 97.8 F | HEART RATE: 52 BPM | HEIGHT: 65 IN

## 2024-01-23 PROCEDURE — 93976 VASCULAR STUDY: CPT

## 2024-01-23 PROCEDURE — 99214 OFFICE O/P EST MOD 30 MIN: CPT | Mod: 25

## 2024-01-23 NOTE — DATA REVIEWED
[FreeTextEntry1] : 4/27/2021  Abd US sig for SMA dissection  remain w proximal segment 1.5 cm   7/27/2021 Abd US sig for SMA mid level chronic  dissection  remains w proximal segment 1.5 cm dilatation   8/2/2022  Abd US sig for SMA mid level chronic  dissection  remains w proximal segment 1.6 cm dilatation   1/23/2024 Abd US sig for SMA mid level chronic  dissection  remains w proximal segment 1.6 cm dilatation                                 no evid of stenosis

## 2024-01-23 NOTE — ASSESSMENT
[Arterial/Venous Disease] : arterial/venous disease [Medication Management] : medication management [FreeTextEntry1] : Impression asymptomatic SMA dissection w ectasia clinically stable   Plan Med Conservative management f/u w Dr Byrd for bp managemnt prn continue ba asa daily  ov w  US s/o SMA dissection and ectasia 12mo jan 2024

## 2024-01-23 NOTE — HISTORY OF PRESENT ILLNESS
[de-identified] : pt has f/u w Dr Byrd for bp management  pt is nnow on baby asa daily  pt is s/p ccy and hepatic lobectomy for HCC pt states no new abd c/o  pt w daughter in attendance  [FreeTextEntry1] : pt was eval at [facility]  where he underwent a cardiac w/u\par   cta of abd/pelvis sif for SMA dissection\par  pt was placed on plavix 75 daily\par  pt states no abd c/o brittany po well\par

## 2024-01-23 NOTE — PHYSICAL EXAM
[Ankle Swelling (On Exam)] : not present [Varicose Veins Of Lower Extremities] : not present [] : not present [Alert] : alert [Oriented to Person] : oriented to person [Oriented to Place] : oriented to place [Oriented to Time] : oriented to time [Calm] : calm [de-identified] : wml [JVD] : no jugular venous distention  [de-identified] : nad [FreeTextEntry1] : abd soft nt nd  [de-identified] : wml [de-identified] : wml [de-identified] : Aron Cranial nerves 2-12 aron grossly intact [de-identified] : cooperative

## 2024-01-29 NOTE — PATIENT PROFILE ADULT - ARRIVAL FROM
Pt is unreachable for 1st F/U. ED navigator will follow-up with patient on/around 2/26/2024 for attempt at 2nd.    Ariadne Aragon  ED Navigator- [employer]  [phone]    Home

## 2024-01-30 RX ORDER — CARVEDILOL 6.25 MG/1
6.25 TABLET, FILM COATED ORAL
Qty: 180 | Refills: 3 | Status: ACTIVE | COMMUNITY
Start: 2019-06-26 | End: 1900-01-01

## 2024-03-06 ENCOUNTER — RX RENEWAL (OUTPATIENT)
Age: 69
End: 2024-03-06

## 2024-03-22 DIAGNOSIS — R73.03 PREDIABETES.: ICD-10-CM

## 2024-03-22 LAB
AFP-TM SERPL-MCNC: 2.4 NG/ML
ALBUMIN SERPL ELPH-MCNC: 4.4 G/DL
ALP BLD-CCNC: 63 U/L
ALT SERPL-CCNC: 20 U/L
ANION GAP SERPL CALC-SCNC: 13 MMOL/L
AST SERPL-CCNC: 18 U/L
BASOPHILS # BLD AUTO: 0.02 K/UL
BASOPHILS NFR BLD AUTO: 0.4 %
BILIRUB SERPL-MCNC: 0.7 MG/DL
BUN SERPL-MCNC: 19 MG/DL
CALCIUM SERPL-MCNC: 9.5 MG/DL
CHLORIDE SERPL-SCNC: 104 MMOL/L
CO2 SERPL-SCNC: 26 MMOL/L
CREAT SERPL-MCNC: 1.1 MG/DL
EGFR: 72 ML/MIN/1.73M2
EOSINOPHIL # BLD AUTO: 0.18 K/UL
EOSINOPHIL NFR BLD AUTO: 3.7 %
GLUCOSE SERPL-MCNC: 103 MG/DL
HCT VFR BLD CALC: 45.2 %
HGB BLD-MCNC: 14.8 G/DL
IMM GRANULOCYTES NFR BLD AUTO: 0.2 %
INR PPP: 1.08 RATIO
LYMPHOCYTES # BLD AUTO: 1.57 K/UL
LYMPHOCYTES NFR BLD AUTO: 32.2 %
MAN DIFF?: NORMAL
MCHC RBC-ENTMCNC: 28 PG
MCHC RBC-ENTMCNC: 32.7 GM/DL
MCV RBC AUTO: 85.6 FL
MONOCYTES # BLD AUTO: 0.32 K/UL
MONOCYTES NFR BLD AUTO: 6.6 %
NEUTROPHILS # BLD AUTO: 2.78 K/UL
NEUTROPHILS NFR BLD AUTO: 56.9 %
PLATELET # BLD AUTO: 124 K/UL
POTASSIUM SERPL-SCNC: 4.6 MMOL/L
PROT SERPL-MCNC: 6.9 G/DL
PT BLD: 12.3 SEC
RBC # BLD: 5.28 M/UL
RBC # FLD: 13.8 %
SODIUM SERPL-SCNC: 142 MMOL/L
WBC # FLD AUTO: 4.88 K/UL

## 2024-03-24 LAB
ESTIMATED AVERAGE GLUCOSE: 126 MG/DL
HBA1C MFR BLD HPLC: 6 %

## 2024-04-01 ENCOUNTER — APPOINTMENT (OUTPATIENT)
Dept: HEPATOLOGY | Facility: CLINIC | Age: 69
End: 2024-04-01
Payer: MEDICARE

## 2024-04-01 VITALS
HEART RATE: 61 BPM | TEMPERATURE: 97.8 F | WEIGHT: 178 LBS | SYSTOLIC BLOOD PRESSURE: 143 MMHG | HEIGHT: 65 IN | OXYGEN SATURATION: 97 % | RESPIRATION RATE: 15 BRPM | DIASTOLIC BLOOD PRESSURE: 95 MMHG | BODY MASS INDEX: 29.66 KG/M2

## 2024-04-01 DIAGNOSIS — I85.00 ESOPHAGEAL VARICES W/OUT BLEEDING: ICD-10-CM

## 2024-04-01 DIAGNOSIS — K75.81 NONALCOHOLIC STEATOHEPATITIS (NASH): ICD-10-CM

## 2024-04-01 DIAGNOSIS — B18.1 CHRONIC VIRAL HEPATITIS B W/OUT DELTA-AGENT: ICD-10-CM

## 2024-04-01 DIAGNOSIS — E66.3 OVERWEIGHT: ICD-10-CM

## 2024-04-01 PROCEDURE — G2211 COMPLEX E/M VISIT ADD ON: CPT

## 2024-04-01 PROCEDURE — 99214 OFFICE O/P EST MOD 30 MIN: CPT

## 2024-04-01 RX ORDER — DIAZEPAM 5 MG/1
5 TABLET ORAL DAILY
Qty: 30 | Refills: 0 | Status: DISCONTINUED | COMMUNITY
Start: 2023-06-26 | End: 2024-04-01

## 2024-04-01 NOTE — ASSESSMENT
[FreeTextEntry1] : Mr. KNOX is a 68-year-old man, orig. from [facility], with chronic hepatitis B, LASSITER, and HCC.  - compensated cirrhosis, normal MELD labs, PLT < 150. Due to LASSITER and chronic hepatitis B   - medium-sized esophageal varices (EGD 5/22/19) that never bled, on carvedilol 12.5 mg bid - tolerating now after initial dizziness when getting up - chronic hepatitis B diagnosed in 2005, on Viread since diagnosis, negative viral load - LASSITER - steatosis 30% seen on resected liver 12/2021; intermittently mildly elevated AST < 50 U/L - HCC, init. 2.2 cm lesion segment 6 in 9/2021, s/p resection 12/28/2021 by Dr. Mcgowan, margins positive. AFP was never elevated. Saw oncology, Dr. Link. MRI 12/2023 showed no recurrence. - LFTs normal - sub-cm hepatic cysts - glucose intolerance - overweight, BMI 26  - SMA dissection 3/2021 - on clopidogrel - GERD, LA-grade C reflux esophagitis (EGD 5/2019): takes omeprazole. - colon cancer screening: no polyps 12/2023 - repeat after 10 yrs   Plan: - return in 3 months after US abdomen, bloodwork - varices, now lower BP: continue carvedilol 12.5 mg bid - HBV: continue tenofovir - GERD: resume omeprazole - NAFLD/LASSITER: weight loss and exercise. Continue Vit E 800 IU/mL  - return in 6 months after repeat bloodwork

## 2024-04-01 NOTE — HISTORY OF PRESENT ILLNESS
[Fatigue] : denies fatigue [Malaise] : denies malaise [Muscle Aches, Generalized (Myalgias)] : denies myalgias [Fever] : denies fever [Diffuse Joint Pain (Arthralgias)] : denies arthralgias [Yellow Skin Or Eyes (Jaundice)] : denies jaundice [Abdominal Pain] : denies abdominal pain [Urine Tests Nonspecific Abnormal Findings Biliuria] : denies dark urine [Light Colored Bowel Movement (Acholic Stools)] : denies pale stools [Insomnia] : denies insomnia [Skin: Rash] : denies rash [Itching (Pruritus)] : denies pruritus [Shortness Of Breath] : denies shortness of breath [Wt Gain ___ Lbs] : no recent weight gain [Wt Loss ___ Lbs] : no recent weight loss [FreeTextEntry1] : - 4/1/24: returns after MRI in December, and bloodwork. Feels fine.  Exam 4/1/24: 178 lbs, BMI 29.6. Labs 3/22: abnormal: , HbA1c 6.0%. Normal: HBV PCR negative, LFTs with ALT 20, AFP 2.4.  - 5/23/23: returns after 9 months instead of three, with his daughter. Saw Dr. Jacome a few days ago. Had bloodwork - LFTs and AFP nl, HBV PCR not done. Had MRI, EGD, and colonoscopy. Still has acid reflux. States he takes 2 pills - aspirin and tenofovir. Does not take carvedilol, lisinopril, and omeprazole. Feels well otherwise.   - 8/5/22: Dong well, regained 10 lbs. Weight 172 lbs, BMI 28 unchanged. Clopidogrel stopped, ASA started by vascular surgery. EGD and colonoscopy not done.  Labs 7/25: LFTs nl, AFP 2.1 ng/mL, HbA1c 6.1%.  - 5/6/22: returns after bloodwork and CT in January. LFTs normal, AFP 2.2 (was never elevated).  - 1/21/22: returns after resection of HCC. Tumor board presentation on 1/20/22 showed positive margin. Per Dr. cMgowan's note, intra-operative impression was possible macrovascular portal vein invasion and staple line excised showing intravascular tumor (ie - NOT parenchymal resection margin). Feels well, denies abdominal pain except mild when he coughs or sneezes. Lost 10 lbs. Exam: abdomen soft, nontender, well-healed surgical scar. No edema.  - 5/3/31: returns after hospitalization at [facility] 3/25 b/o SMA dissection, saw MARIA INES Messina after discharge, now on clopidogrel. Back pain has resolved.  - 2/4/21: returns after bloodwork. MRI done at [facility] report not available. Had hypotension in December, , but no hematochezia or melena. Feels good, is active. Has not lost weight, BMI 28.2  - 10/19/20: returns after US showed 1 cm liver lesion and MRI showed indeterminate lesion. Doing well. GERD controlled with omeprazole.   - 7/20/20: returns after labs in January. Tolerated carvedilol 25 mg bid, is very active gardening, makes his own wine. Takes tenofovir. - 11/4/20: return visit - 6/3/19: here after EGD which showed large esophageal varices. Tolerating carvedilol 12.5 mg bid - I switched from nadolol b/o bradycardia on sub-optimal dose. Today HR in the 50s as always. Feels very energetic - built the handrails of stairs and porch around his house in 3 days, but has longstanding sleep problems at night.   - 7/15/19: doing well, tolerating carvedilol 25 mg bid, but HR 49 and BP sometimes 90s at home - got dizzy at home after getting up quickly; also taking omeprazole for GERD seen on EGD 5/2019.  CT 2/2019 report reviewed with patient.  - initial visit  2/5/19: Mr. KNOX is a 63 year old man with chronic hepatitis B (dx. 2004), on Viread since diagnosis, compensated cirrhosis, large varices that never bled, on carvedilol, GERD, overweight BMI 27, 176 lbs.   weight: never signif. heavier than now - overweight. Alcohol: social use. Never more than 1 drink per day. SHx: born and raised in [place], lived in [place] [place] after age 28. Wife Malt[place].  Workup: - 12/14/23 MRI abdomen wwo: s/p R partial hepatectomy, no suspicious liver mass. CCY. Spleen nl. No ascites. Unchanged SMA dissection adn 1.3 cm aneurysm. - 4/10/23 MRI abdomen wwo: cirrhosis, s/p R lobe resection. Steatosis. Scattered wedge-shaped shashank of arterial enhancement, likely perfusion phenomena. CCY. Spleen nl. Few small abdom. LN up to 1 cm.  - 2/8/23 EGD: small varices 4 bands placed, scarring from prior treatment. LA-grade B esophagitis. Gastric erosions, biopsied. Path: chronic active gastritis, H. pylori negative. - 12/14/22 colonoscopy: good prep except part of cecum, small internal hemorrhoids, 1 diverticulum  - 5/3/22 MRI abdomen: cirrhosis, s/p R hepatectomy, no suspicious liver mass. Bilat. hypervascular foci, likely shunts. Hemangioma segemtn 4, sub-cm, stable since 2016. CCY. Atherosclerosis, chronic 1.3 cm SMA dilatation, unchanged. - 1/19/22 CT abdomen: R lobe postoperative changes after partial hepatectomy. No evidence of residual tumor. Patent portal veins.  Cirrhosis. Peripheral hypervascular foci R lobe and L lobe - - suggestive of shunts. Spleen normal. No ascites. Atherosclerosis. Persistent SMA dilation 1.5 cm without dissection, unchanged. - 3/16/21 CTA abdomen: liver normal, chronic dissection SMA with persistent dilatation 1.6 cm. - 12/28/21 pathology segment 6 resection, GB: HCC, small vessel invation, moderately differentiated, cirrhosis, steatosis 30%. - 10/7/20 MRI (scanned): 6 mm liver lesion inferior R lobe seg 6/7, T2 hyperintense, no definitive arterial enhancement; motion artefact. No overt cirrhotic features. Recommend MRI after 4-6 mo. Moderate fatty replacement-marbling throughout pancreas. - 9/25/20 US abdomen: cirrhosis, 1 cm liver lesion indeterminate. - 3/17/20 US abdomen: mild hepatic steatosis. No lesion. GB normal. - 9/18/19 MRI ([facility]): mildly cirrhotic liver, sub-cm lesions likely cysts. Spleen normal. Stable SMA dissection. Normal GB. 1.1 cm ivanna hepatitis T2 hyperintense and enhancing lymph node.  - 2/25/19 CT abdomen wwo (scanned): cirrhosis, small probable hemangioma seg IVb. SMA dissection several cm. Atherosclerotic changes. No comparison possible. - 5/22/19 EGD: medium-sized varices, not banded. LA-grade C reflux esophagitis. Two gastric inlet patches in the upper esophagus. Mild erosive gastritis, biopsied. Started Omeprazole 40 mg/d. Given bradycardia, switched nadolol 20 mg/d to carvedilol - start 6.25 mg bid, increase to 12.5 mg bid after 3 days if tolerated. Path: nonspecific gastritis, H. pylori negative. - 11/2016 MRI abdomen: Cirrhosis. 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 The remaining vessels are patent. No significant varices. IMPRESSION: Cirrhosis. No evidence of hepatoma - Colonoscopy: normal in 2011

## 2024-04-24 ENCOUNTER — NON-APPOINTMENT (OUTPATIENT)
Age: 69
End: 2024-04-24

## 2024-04-25 ENCOUNTER — INPATIENT (INPATIENT)
Facility: HOSPITAL | Age: 69
LOS: 0 days | Discharge: HOME | DRG: 308 | End: 2024-04-26
Attending: HOSPITALIST | Admitting: STUDENT IN AN ORGANIZED HEALTH CARE EDUCATION/TRAINING PROGRAM
Payer: MEDICARE

## 2024-04-25 VITALS
OXYGEN SATURATION: 98 % | DIASTOLIC BLOOD PRESSURE: 97 MMHG | TEMPERATURE: 97 F | RESPIRATION RATE: 16 BRPM | SYSTOLIC BLOOD PRESSURE: 137 MMHG | WEIGHT: 160.06 LBS | HEART RATE: 57 BPM | HEIGHT: 68 IN

## 2024-04-25 DIAGNOSIS — Z98.890 OTHER SPECIFIED POSTPROCEDURAL STATES: Chronic | ICD-10-CM

## 2024-04-25 LAB
ADD ON TEST-SPECIMEN IN LAB: SIGNIFICANT CHANGE UP
ALBUMIN SERPL ELPH-MCNC: 4.3 G/DL — SIGNIFICANT CHANGE UP (ref 3.3–5)
ALP SERPL-CCNC: 56 U/L — SIGNIFICANT CHANGE UP (ref 40–120)
ALT FLD-CCNC: 16 U/L — SIGNIFICANT CHANGE UP (ref 10–45)
ANION GAP SERPL CALC-SCNC: 8 MMOL/L — SIGNIFICANT CHANGE UP (ref 5–17)
AST SERPL-CCNC: 22 U/L — SIGNIFICANT CHANGE UP (ref 10–40)
BASOPHILS # BLD AUTO: 0.01 K/UL — SIGNIFICANT CHANGE UP (ref 0–0.2)
BASOPHILS NFR BLD AUTO: 0.2 % — SIGNIFICANT CHANGE UP (ref 0–2)
BILIRUB SERPL-MCNC: 0.6 MG/DL — SIGNIFICANT CHANGE UP (ref 0.2–1.2)
BUN SERPL-MCNC: 16 MG/DL — SIGNIFICANT CHANGE UP (ref 7–23)
CALCIUM SERPL-MCNC: 9.6 MG/DL — SIGNIFICANT CHANGE UP (ref 8.4–10.5)
CHLORIDE SERPL-SCNC: 103 MMOL/L — SIGNIFICANT CHANGE UP (ref 96–108)
CO2 SERPL-SCNC: 27 MMOL/L — SIGNIFICANT CHANGE UP (ref 22–31)
CREAT SERPL-MCNC: 0.99 MG/DL — SIGNIFICANT CHANGE UP (ref 0.5–1.3)
EGFR: 82 ML/MIN/1.73M2 — SIGNIFICANT CHANGE UP
EOSINOPHIL # BLD AUTO: 0.23 K/UL — SIGNIFICANT CHANGE UP (ref 0–0.5)
EOSINOPHIL NFR BLD AUTO: 4.1 % — SIGNIFICANT CHANGE UP (ref 0–6)
GLUCOSE SERPL-MCNC: 98 MG/DL — SIGNIFICANT CHANGE UP (ref 70–99)
HCT VFR BLD CALC: 43.2 % — SIGNIFICANT CHANGE UP (ref 39–50)
HGB BLD-MCNC: 14.6 G/DL — SIGNIFICANT CHANGE UP (ref 13–17)
IMM GRANULOCYTES NFR BLD AUTO: 0.2 % — SIGNIFICANT CHANGE UP (ref 0–0.9)
LYMPHOCYTES # BLD AUTO: 2.03 K/UL — SIGNIFICANT CHANGE UP (ref 1–3.3)
LYMPHOCYTES # BLD AUTO: 36.4 % — SIGNIFICANT CHANGE UP (ref 13–44)
MCHC RBC-ENTMCNC: 29 PG — SIGNIFICANT CHANGE UP (ref 27–34)
MCHC RBC-ENTMCNC: 33.8 GM/DL — SIGNIFICANT CHANGE UP (ref 32–36)
MCV RBC AUTO: 85.9 FL — SIGNIFICANT CHANGE UP (ref 80–100)
MONOCYTES # BLD AUTO: 0.44 K/UL — SIGNIFICANT CHANGE UP (ref 0–0.9)
MONOCYTES NFR BLD AUTO: 7.9 % — SIGNIFICANT CHANGE UP (ref 2–14)
NEUTROPHILS # BLD AUTO: 2.86 K/UL — SIGNIFICANT CHANGE UP (ref 1.8–7.4)
NEUTROPHILS NFR BLD AUTO: 51.2 % — SIGNIFICANT CHANGE UP (ref 43–77)
NRBC # BLD: 0 /100 WBCS — SIGNIFICANT CHANGE UP (ref 0–0)
PLATELET # BLD AUTO: 109 K/UL — LOW (ref 150–400)
POTASSIUM SERPL-MCNC: 4.8 MMOL/L — SIGNIFICANT CHANGE UP (ref 3.5–5.3)
POTASSIUM SERPL-SCNC: 4.8 MMOL/L — SIGNIFICANT CHANGE UP (ref 3.5–5.3)
PROT SERPL-MCNC: 6.9 G/DL — SIGNIFICANT CHANGE UP (ref 6–8.3)
RBC # BLD: 5.03 M/UL — SIGNIFICANT CHANGE UP (ref 4.2–5.8)
RBC # FLD: 13.6 % — SIGNIFICANT CHANGE UP (ref 10.3–14.5)
SODIUM SERPL-SCNC: 138 MMOL/L — SIGNIFICANT CHANGE UP (ref 135–145)
TROPONIN T, HIGH SENSITIVITY RESULT: 10 NG/L — SIGNIFICANT CHANGE UP (ref 0–51)
TROPONIN T, HIGH SENSITIVITY RESULT: 14 NG/L — SIGNIFICANT CHANGE UP (ref 0–51)
WBC # BLD: 5.58 K/UL — SIGNIFICANT CHANGE UP (ref 3.8–10.5)
WBC # FLD AUTO: 5.58 K/UL — SIGNIFICANT CHANGE UP (ref 3.8–10.5)

## 2024-04-25 PROCEDURE — 74174 CTA ABD&PLVS W/CONTRAST: CPT | Mod: 26,MC

## 2024-04-25 PROCEDURE — 71275 CT ANGIOGRAPHY CHEST: CPT | Mod: 26,MC

## 2024-04-25 PROCEDURE — 99285 EMERGENCY DEPT VISIT HI MDM: CPT

## 2024-04-25 PROCEDURE — 71046 X-RAY EXAM CHEST 2 VIEWS: CPT | Mod: 26

## 2024-04-25 NOTE — ED PROVIDER NOTE - IV ALTEPLASE DOOR HIDDEN
Anesthesia Post Evaluation    Patient: Shiraz Pillai    Procedure(s) Performed: Procedure(s) (LRB):  Right distal biceps tendon repair (Right)    Final Anesthesia Type: general      Patient location during evaluation: PACU  Patient participation: Yes- Able to Participate  Level of consciousness: awake and alert  Post-procedure vital signs: reviewed and stable  Pain management: adequate  Airway patency: patent    PONV status at discharge: No PONV  Anesthetic complications: no      Cardiovascular status: blood pressure returned to baseline  Respiratory status: unassisted and room air  Hydration status: euvolemic  Follow-up not needed.          Vitals Value Taken Time   /71 04/25/23 1605   Temp 36.3 °C (97.4 °F) 04/25/23 1508   Pulse 82 04/25/23 1605   Resp 18 04/25/23 1605   SpO2 95 % 04/25/23 1605         Event Time   Out of Recovery 15:30:00         Pain/Edwar Score: Pain Rating Prior to Med Admin: 0 (4/25/2023 12:13 PM)  Edwar Score: 10 (4/25/2023  4:10 PM)        
show

## 2024-04-25 NOTE — ED PROVIDER NOTE - OBJECTIVE STATEMENT
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  Pain is pressure-like tightness, parasternal, radiating to the left axilla, nonexertional, nonpleuritic, intermittent in nature, with no exacerbating or alleviating factors, no associated diaphoresis, shortness of breath, fevers, chills, nausea, vomiting, diarrhea.  No recent travel or sick contacts. Patient presented to urgent care today, was found to be sinus bradycardia and 55's.  Prior EKG showing sinus bradycardia in the 50s. Patient had stress test and echo 2 months ago with Dr. Flores patient's cardiologist.  Was told that it is normal.

## 2024-04-25 NOTE — ED PROVIDER NOTE - PROGRESS NOTE DETAILS
Nataliya Davis, PGY1    CT surg w no acute interventions. TBA for symptomatic bradycardia. Pt aware, OK w plan. Nataliya Davis, PGY1    CT showing filling defect in descending thoracic aorta, suspicion for SMA dissection progression. Will call CT surg.

## 2024-04-25 NOTE — ED PROVIDER NOTE - ATTENDING CONTRIBUTION TO CARE
68-year-old male with history of hypertension SMA dissection liver cirrhosis status post hepatectomy HBV on tenofovir nonbleeding gastric varices presenting with acute onset chest pain about 8 hours ago patient improved since patient is on aspirin denies nausea vomiting rectal bleeding denies chest pain or shortness of breath no orthopnea  On physical examination patient is well-appearing no acute distress, comfortable neuro intact clear lungs S1-S2 soft nontender abdomen no midline tenderness over the spine well-perfused equal pulses  Differential is broad and includes ACS as a source of the pain but also given the history extension of the dissection however given his clinical picture unlikely to be an ascending dissection I spoke with the patient and the wife in detail about risks and benefits of CT angiography and the preferred to proceed with angiography to rule out an existing dissection with labs troponin EKG and reassess

## 2024-04-25 NOTE — ED PROVIDER NOTE - CLINICAL SUMMARY MEDICAL DECISION MAKING FREE TEXT BOX
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  Pain is pressure-like tightness, parasternal, radiating to the left axilla, nonexertional, nonpleuritic, intermittent in nature, with no exacerbating or alleviating factors, no associated diaphoresis, shortness of breath, fevers, chills, nausea, vomiting, diarrhea.  No recent travel or sick contacts. Patient presented to urgent care today, was found to be sinus bradycardia and 55's.  Prior EKG showing sinus bradycardia in the 50s. Patient had stress test and echo 2 months ago with Dr. Flores patient's cardiologist.  Was told that it is normal.     VSS. Clinically stable. EKG wnl w no ST elevations or T wave inversions, . PE, well appearing, no acute distress, AAOx3. NCAT, EOMI, normal conjunctiva, mucous membranes moist, LCTAB no w/r/c, no MRG, RRR, non-reproducible CP, abd NDNT, no rebound tenderness or guarding, no CVA ttp, no focal neuro deficits, neurovascularly intact, dp 2+, no bruising, rashes, or erythema. Low sspicion for ACS, but Will screen for ACS (troponin), anemia/electrolytes, and chest x ray to screen for cardiopulmonary pathology. Given presentation, low likelyhood of PE vs dissection vs pnx. Dispo pending imaging, labs

## 2024-04-25 NOTE — ED PROVIDER NOTE - PROGRESS NOTE
Problem: CARDIOVASCULAR - ADULT  Goal: Maintains optimal cardiac output and hemodynamic stability  Description: INTERVENTIONS:  - Monitor vital signs, rhythm, and trends  - Monitor for bleeding, hypotension and signs of decreased cardiac output  - Evaluate effectiveness of vasoactive medications to optimize hemodynamic stability  - Monitor arterial and/or venous puncture sites for bleeding and/or hematoma  - Assess quality of pulses, skin color and temperature  - Assess for signs of decreased coronary artery perfusion - ex. Angina  - Evaluate fluid balance, assess for edema, trend weights  Outcome: Progressing  Goal: Absence of cardiac arrhythmias or at baseline  Description: INTERVENTIONS:  - Continuous cardiac monitoring, monitor vital signs, obtain 12 lead EKG if indicated  - Evaluate effectiveness of antiarrhythmic and heart rate control medications as ordered  - Initiate emergency measures for life threatening arrhythmias  - Monitor electrolytes and administer replacement therapy as ordered  Outcome: Progressing     Problem: GASTROINTESTINAL - ADULT  Goal: Minimal or absence of nausea and vomiting  Description: INTERVENTIONS:  - Maintain adequate hydration with IV or PO as ordered and tolerated  - Nasogastric tube to low intermittent suction as ordered  - Evaluate effectiveness of ordered antiemetic medications  - Provide nonpharmacologic comfort measures as appropriate  - Advance diet as tolerated, if ordered  - Obtain nutritional consult as needed  - Evaluate fluid balance  Outcome: Progressing  Goal: Maintains or returns to baseline bowel function  Description: INTERVENTIONS:  - Assess bowel function  - Maintain adequate hydration with IV or PO as ordered and tolerated  - Evaluate effectiveness of GI medications  - Encourage mobilization and activity  - Obtain nutritional consult as needed  - Establish a toileting routine/schedule  - Consider collaborating with pharmacy to review patient's medication  profile  Outcome: Progressing  Goal: Maintains adequate nutritional intake (undernourished)  Description: INTERVENTIONS:  - Monitor percentage of each meal consumed  - Identify factors contributing to decreased intake, treat as appropriate  - Assist with meals as needed  - Monitor I&O, WT and lab values  - Obtain nutritional consult as needed  - Optimize oral hygiene and moisture  - Encourage food from home; allow for food preferences  - Enhance eating environment  Outcome: Progressing  Goal: Achieves appropriate nutritional intake (bariatric)  Description: INTERVENTIONS:  - Monitor for over-consumption  - Identify factors contributing to increased intake, treat as appropriate  - Monitor I&O, WT and lab values  - Obtain nutritional consult as needed  - Evaluate psychosocial factors contributing to over-consumption  Outcome: Progressing     Problem: GENITOURINARY - ADULT  Goal: Absence of urinary retention  Description: INTERVENTIONS:  - Assess patient’s ability to void and empty bladder  - Monitor intake/output and perform bladder scan as needed  - Follow urinary retention protocol/standard of care  - Consider collaborating with pharmacy to review patient's medication profile  - Implement strategies to promote bladder emptying  Outcome: Progressing     Problem: POSTPARTUM  Goal: Long Term Goal:Experiences normal postpartum course  Description: INTERVENTIONS:  - Assess and monitor vital signs and lab values.  - Assess fundus and lochia.  - Provide ice/sitz baths for perineum discomfort.  - Monitor healing of incision/episiotomy/laceration, and assess for signs and symptoms of infection and hematoma.  - Assess bladder function and monitor for bladder distention.  - Provide/instruct/assist with pericare as needed.  - Provide VTE prophylaxis as needed.  - Monitor bowel function.  - Encourage ambulation and provide assistance as needed.  - Assess and monitor emotional status and provide social service/psych resources as  needed.  - Utilize standard precautions and use personal protective equipment as indicated. Ensure aseptic care of all intravenous lines and invasive tubes/drains.  - Obtain immunization and exposure to communicable diseases history.  Outcome: Progressing  Goal: Optimize infant feeding at the breast  Description: INTERVENTIONS:  - Initiate breast feeding within first hour after birth.   - Monitor effectiveness of current breast feeding efforts.  - Assess support systems available to mother/family.  - Identify cultural beliefs/practices regarding lactation, letdown techniques, maternal food preferences.  - Assess mother's knowledge and previous experience with breast feeding.  - Provide information as needed about early infant feeding cues (e.g., rooting, lip smacking, sucking fingers/hand) versus late cue of crying.  - Discuss/demonstrate breast feeding aids (e.g., infant sling, nursing footstool/pillows, and breast pumps).  - Encourage mother/other family members to express feelings/concerns, and actively listen.  - Educate father/SO about benefits of breast feeding and how to manage common lactation challenges.  - Recommend avoidance of specific medications or substances incompatible with breast feeding.  - Assess and monitor for signs of nipple pain/trauma.  - Instruct and provide assistance with proper latch.  - Review techniques for milk expression (breast pumping) and storage of breast milk. Provide pumping equipment/supplies, instructions and assistance, as needed.  - Encourage rooming-in and breast feeding on demand.  - Encourage skin-to-skin contact.  - Provide LC support as needed.  - Assess for and manage engorgement.  - Provide breast feeding education handouts and information on community breast feeding support.   Outcome: Progressing  Goal: Establishment of adequate milk supply with medication/procedure interruptions  Description: INTERVENTIONS:  - Review techniques for milk expression (breast  pumping).   - Provide pumping equipment/supplies, instructions, and assistance until it is safe to breastfeed infant.  Outcome: Progressing  Goal: Experiences normal breast weaning course  Description: INTERVENTIONS:  - Assess for and manage engorgement.  - Instruct on breast care.  - Provide comfort measures.  Outcome: Progressing  Goal: Appropriate maternal -  bonding  Description: INTERVENTIONS:  - Assess caregiver- interactions.  - Assess caregiver's emotional status and coping mechanisms.  - Encourage caregiver to participate in  daily care.  - Assess support systems available to mother/family.  - Provide /case management support as needed.  Outcome: Progressing   Sat with patient and updated patient and family on plan of care. Pain medication discussed and answered all questions. Vitals are WNL. Breastfeeding on demand and breastfeeding successfully. Lochia WNL and fundus is firm. Will call RN with any questions.    Stable.

## 2024-04-25 NOTE — ED ADULT NURSE NOTE - OBJECTIVE STATEMENT
67y/o M pmh liver ca s/p partial resection 2022, HTN, bradycardia, presents to the ED from urgent care w/ c/o of chest pain since 11am this morning.  Pt states tight non radiating chest pain with mild dizziness and episodes of HR 39bpm-40s is atypical for him. Pt endorses his HR is normally 50-55. Pt denies NVD, sob, dizziness, fever, chills, changes in vision, abd pain at this time. Pt Albanian speaking, wife at bed side , requesting to translate.

## 2024-04-25 NOTE — ED PROVIDER NOTE - PHYSICAL EXAMINATION
Gen: AAOx3, non-toxic  Head: NCAT  HEENT: EOMI, oral mucosa moist, normal conjunctiva  Lung: CTAB, no respiratory distress, no wheezes/rhonchi/rales B/L,   CV: RRR, no murmurs, rubs or gallops, non-reproducible CP.   Abd: soft, NTND, no guarding, no CVA tenderness  MSK: no visible deformities  Neuro: No focal sensory or motor deficits  Skin: Warm, well perfused, no rash  Psych: normal affect.

## 2024-04-26 ENCOUNTER — TRANSCRIPTION ENCOUNTER (OUTPATIENT)
Age: 69
End: 2024-04-26

## 2024-04-26 VITALS
TEMPERATURE: 98 F | RESPIRATION RATE: 18 BRPM | OXYGEN SATURATION: 97 % | HEART RATE: 59 BPM | DIASTOLIC BLOOD PRESSURE: 89 MMHG | SYSTOLIC BLOOD PRESSURE: 142 MMHG

## 2024-04-26 DIAGNOSIS — K74.60 UNSPECIFIED CIRRHOSIS OF LIVER: ICD-10-CM

## 2024-04-26 DIAGNOSIS — I10 ESSENTIAL (PRIMARY) HYPERTENSION: ICD-10-CM

## 2024-04-26 DIAGNOSIS — R00.1 BRADYCARDIA, UNSPECIFIED: ICD-10-CM

## 2024-04-26 DIAGNOSIS — I25.10 ATHEROSCLEROTIC HEART DISEASE OF NATIVE CORONARY ARTERY WITHOUT ANGINA PECTORIS: ICD-10-CM

## 2024-04-26 DIAGNOSIS — R07.89 OTHER CHEST PAIN: ICD-10-CM

## 2024-04-26 DIAGNOSIS — I71.012 DISSECTION OF DESCENDING THORACIC AORTA: ICD-10-CM

## 2024-04-26 LAB
A1C WITH ESTIMATED AVERAGE GLUCOSE RESULT: 6.1 % — HIGH (ref 4–5.6)
ALBUMIN SERPL ELPH-MCNC: 4.1 G/DL — SIGNIFICANT CHANGE UP (ref 3.3–5)
ALP SERPL-CCNC: 58 U/L — SIGNIFICANT CHANGE UP (ref 40–120)
ALT FLD-CCNC: 17 U/L — SIGNIFICANT CHANGE UP (ref 10–45)
ANION GAP SERPL CALC-SCNC: 12 MMOL/L — SIGNIFICANT CHANGE UP (ref 5–17)
AST SERPL-CCNC: 20 U/L — SIGNIFICANT CHANGE UP (ref 10–40)
BILIRUB SERPL-MCNC: 0.5 MG/DL — SIGNIFICANT CHANGE UP (ref 0.2–1.2)
BUN SERPL-MCNC: 13 MG/DL — SIGNIFICANT CHANGE UP (ref 7–23)
CALCIUM SERPL-MCNC: 9.8 MG/DL — SIGNIFICANT CHANGE UP (ref 8.4–10.5)
CHLORIDE SERPL-SCNC: 104 MMOL/L — SIGNIFICANT CHANGE UP (ref 96–108)
CHOLEST SERPL-MCNC: 163 MG/DL — SIGNIFICANT CHANGE UP
CO2 SERPL-SCNC: 24 MMOL/L — SIGNIFICANT CHANGE UP (ref 22–31)
CREAT SERPL-MCNC: 0.86 MG/DL — SIGNIFICANT CHANGE UP (ref 0.5–1.3)
EGFR: 93 ML/MIN/1.73M2 — SIGNIFICANT CHANGE UP
ESTIMATED AVERAGE GLUCOSE: 128 MG/DL — HIGH (ref 68–114)
GLUCOSE SERPL-MCNC: 148 MG/DL — HIGH (ref 70–99)
HCT VFR BLD CALC: 42.9 % — SIGNIFICANT CHANGE UP (ref 39–50)
HDLC SERPL-MCNC: 39 MG/DL — LOW
HGB BLD-MCNC: 14.4 G/DL — SIGNIFICANT CHANGE UP (ref 13–17)
LIPID PNL WITH DIRECT LDL SERPL: 99 MG/DL — SIGNIFICANT CHANGE UP
MCHC RBC-ENTMCNC: 28.6 PG — SIGNIFICANT CHANGE UP (ref 27–34)
MCHC RBC-ENTMCNC: 33.6 GM/DL — SIGNIFICANT CHANGE UP (ref 32–36)
MCV RBC AUTO: 85.3 FL — SIGNIFICANT CHANGE UP (ref 80–100)
NON HDL CHOLESTEROL: 125 MG/DL — SIGNIFICANT CHANGE UP
NRBC # BLD: 0 /100 WBCS — SIGNIFICANT CHANGE UP (ref 0–0)
PLATELET # BLD AUTO: 113 K/UL — LOW (ref 150–400)
POTASSIUM SERPL-MCNC: 4 MMOL/L — SIGNIFICANT CHANGE UP (ref 3.5–5.3)
POTASSIUM SERPL-SCNC: 4 MMOL/L — SIGNIFICANT CHANGE UP (ref 3.5–5.3)
PROT SERPL-MCNC: 6.9 G/DL — SIGNIFICANT CHANGE UP (ref 6–8.3)
RBC # BLD: 5.03 M/UL — SIGNIFICANT CHANGE UP (ref 4.2–5.8)
RBC # FLD: 13.4 % — SIGNIFICANT CHANGE UP (ref 10.3–14.5)
SODIUM SERPL-SCNC: 140 MMOL/L — SIGNIFICANT CHANGE UP (ref 135–145)
TRIGL SERPL-MCNC: 141 MG/DL — SIGNIFICANT CHANGE UP
WBC # BLD: 4.32 K/UL — SIGNIFICANT CHANGE UP (ref 3.8–10.5)
WBC # FLD AUTO: 4.32 K/UL — SIGNIFICANT CHANGE UP (ref 3.8–10.5)

## 2024-04-26 PROCEDURE — 93010 ELECTROCARDIOGRAM REPORT: CPT

## 2024-04-26 PROCEDURE — 71275 CT ANGIOGRAPHY CHEST: CPT | Mod: MC

## 2024-04-26 PROCEDURE — 74174 CTA ABD&PLVS W/CONTRAST: CPT | Mod: MC

## 2024-04-26 PROCEDURE — 93005 ELECTROCARDIOGRAM TRACING: CPT

## 2024-04-26 PROCEDURE — 99223 1ST HOSP IP/OBS HIGH 75: CPT

## 2024-04-26 PROCEDURE — 80053 COMPREHEN METABOLIC PANEL: CPT

## 2024-04-26 PROCEDURE — 85027 COMPLETE CBC AUTOMATED: CPT

## 2024-04-26 PROCEDURE — 83690 ASSAY OF LIPASE: CPT

## 2024-04-26 PROCEDURE — 99221 1ST HOSP IP/OBS SF/LOW 40: CPT

## 2024-04-26 PROCEDURE — 84484 ASSAY OF TROPONIN QUANT: CPT

## 2024-04-26 PROCEDURE — 80061 LIPID PANEL: CPT

## 2024-04-26 PROCEDURE — 71046 X-RAY EXAM CHEST 2 VIEWS: CPT

## 2024-04-26 PROCEDURE — 85025 COMPLETE CBC W/AUTO DIFF WBC: CPT

## 2024-04-26 PROCEDURE — 99285 EMERGENCY DEPT VISIT HI MDM: CPT | Mod: 25

## 2024-04-26 PROCEDURE — 83036 HEMOGLOBIN GLYCOSYLATED A1C: CPT

## 2024-04-26 RX ORDER — LISINOPRIL 2.5 MG/1
2.5 TABLET ORAL DAILY
Refills: 0 | Status: DISCONTINUED | OUTPATIENT
Start: 2024-04-26 | End: 2024-04-26

## 2024-04-26 RX ORDER — LISINOPRIL 2.5 MG/1
2.5 TABLET ORAL ONCE
Refills: 0 | Status: COMPLETED | OUTPATIENT
Start: 2024-04-26 | End: 2024-04-26

## 2024-04-26 RX ORDER — CLOPIDOGREL BISULFATE 75 MG/1
1 TABLET, FILM COATED ORAL
Qty: 0 | Refills: 0 | DISCHARGE

## 2024-04-26 RX ORDER — LANOLIN ALCOHOL/MO/W.PET/CERES
3 CREAM (GRAM) TOPICAL AT BEDTIME
Refills: 0 | Status: DISCONTINUED | OUTPATIENT
Start: 2024-04-26 | End: 2024-04-26

## 2024-04-26 RX ORDER — ASPIRIN/CALCIUM CARB/MAGNESIUM 324 MG
81 TABLET ORAL DAILY
Refills: 0 | Status: DISCONTINUED | OUTPATIENT
Start: 2024-04-26 | End: 2024-04-26

## 2024-04-26 RX ORDER — LISINOPRIL 2.5 MG/1
1 TABLET ORAL
Qty: 30 | Refills: 0
Start: 2024-04-26 | End: 2024-05-25

## 2024-04-26 RX ORDER — LISINOPRIL 2.5 MG/1
1 TABLET ORAL
Qty: 0 | Refills: 0 | DISCHARGE
Start: 2024-04-26

## 2024-04-26 RX ORDER — ACETAMINOPHEN 500 MG
650 TABLET ORAL EVERY 6 HOURS
Refills: 0 | Status: DISCONTINUED | OUTPATIENT
Start: 2024-04-26 | End: 2024-04-26

## 2024-04-26 RX ORDER — OMEPRAZOLE 10 MG/1
1 CAPSULE, DELAYED RELEASE ORAL
Qty: 0 | Refills: 0 | DISCHARGE

## 2024-04-26 RX ORDER — ASPIRIN/CALCIUM CARB/MAGNESIUM 324 MG
1 TABLET ORAL
Refills: 0 | DISCHARGE

## 2024-04-26 RX ORDER — LISINOPRIL 2.5 MG/1
5 TABLET ORAL DAILY
Refills: 0 | Status: DISCONTINUED | OUTPATIENT
Start: 2024-04-27 | End: 2024-04-26

## 2024-04-26 RX ORDER — TENOFOVIR DISOPROXIL FUMARATE 300 MG/1
300 TABLET, FILM COATED ORAL DAILY
Refills: 0 | Status: DISCONTINUED | OUTPATIENT
Start: 2024-04-26 | End: 2024-04-26

## 2024-04-26 RX ADMIN — LISINOPRIL 2.5 MILLIGRAM(S): 2.5 TABLET ORAL at 18:02

## 2024-04-26 RX ADMIN — TENOFOVIR DISOPROXIL FUMARATE 300 MILLIGRAM(S): 300 TABLET, FILM COATED ORAL at 12:55

## 2024-04-26 RX ADMIN — Medication 81 MILLIGRAM(S): at 12:54

## 2024-04-26 RX ADMIN — LISINOPRIL 2.5 MILLIGRAM(S): 2.5 TABLET ORAL at 06:29

## 2024-04-26 NOTE — H&P ADULT - PROBLEM SELECTOR PLAN 2
- Focal descending thoracic dissection seen on CT  - No other possible cause of acute abdominal pain seen on CT  - CT Surg. consulted recommend no surgical intervention  - Maintain SBP<130

## 2024-04-26 NOTE — CONSULT NOTE ADULT - ASSESSMENT
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  Pain is pressure-like tightness, parasternal, radiating to the left axilla, nonexertional, nonpleuritic, intermittent in nature, with no exacerbating or alleviating factors, no associated diaphoresis, shortness of breath, fevers, chills, nausea, vomiting, diarrhea.  No recent travel or sick contacts. Patient presented to urgent care today, was found to be sinus bradycardia and 55's.  Prior EKG showing sinus bradycardia in the 50s. Patient had stress test and echo 2 months ago with Dr. Flores patient's cardiologist.  Was told that it is normal.  In ER, Troponins negative, CTA obtained : Intraluminal filling defect in the mid thoracic descending aorta, suggestive of a focal Aortic Dissection. Consult for CT surgery Dr. Donahue

## 2024-04-26 NOTE — DISCHARGE NOTE PROVIDER - NSDCFUSCHEDAPPT_GEN_ALL_CORE_FT
[facility]  HEPATOLOGY [address] [place] [address]   Scheduled Appointment: 07/08/2024     Rosalio Jaquez Physician [facility]  INTMED  [address]  Scheduled Appointment: 05/10/2024    [facility]  HEPATOLOGY [address] [place] [address]   Scheduled Appointment: 07/08/2024

## 2024-04-26 NOTE — DISCHARGE NOTE NURSING/CASE MANAGEMENT/SOCIAL WORK - NSDCFUADDAPPT_GEN_ALL_CORE_FT
APPTS ARE READY TO BE MADE: [x] YES    Best Family or Patient Contact (if needed):    Additional Information about above appointments (if needed):    1: Dr. Jaquez 3: Follow up with PCP - Dr. Jaquez  for  lab work  to check your platelet  count , Please Monitor your BP  if BP persistently  stays > 130  you will need medication adjustments  2: Dr. Byrd - Cardiology  3: Please follow up with CTS clinic in  1-2 weeks     Other comments or requests:

## 2024-04-26 NOTE — H&P ADULT - PROBLEM SELECTOR PLAN 1
Bradycardia to 50 ion EKG  - Tele monitoring  - Fall risk  - Hold home carvedilol for now  - Please obtain EP cardiology consult, will await recs

## 2024-04-26 NOTE — H&P ADULT - HISTORY OF PRESENT ILLNESS
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 Patient reports abdominal pain may have been due to gas and has improved on its own now. Only felt abdominal pain today. Measured his HR at home to be 39 which prompted him to come to [facility] because he was unsure if his pain was related to his heart. Denied associated SOB or diaphoresis. Considered Chest pain on ED assessment. Patient has baseline bradycardia in 50s (on carvedilol for varices) but has nto be as low as 30s prior to his knowledge. Denies dizziness at rest but does get lightheaded when getting up from seated position too quickly. EKG here reveals bradycardia at 50, prior EKG showing sinus bradycardia in the 50s. Patient had stress test and echo 2 months ago with Dr. Byrd patient's cardiologist. Patient denies fever, chills, SOB, headache, dizziness, nausea, vomiting, constipation, dysuria. 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 Patient reports abdominal pain may have been due to gas and has improved on its own now. Only felt abdominal pain today. Measured his HR at home to be 39 which prompted him to come to [facility] because he was unsure if his pain was related to his heart. Denied associated SOB or diaphoresis. Considered Chest pain on ED assessment. Patient has baseline bradycardia in 50s (on carvedilol for varices) but has nto be as low as 30s prior to his knowledge. Denies dizziness at rest but does get lightheaded when getting up from seated position too quickly. EKG here reveals bradycardia at 50, prior EKG showing sinus bradycardia in the 50s. Patient had stress test and echo 2 months ago with Dr. Byrd (patient's cardiologist), which he claims were negative for acute disease. Patient denies fever, chills, SOB, headache, dizziness, nausea, vomiting, constipation, dysuria.

## 2024-04-26 NOTE — DISCHARGE NOTE PROVIDER - CARE PROVIDERS DIRECT ADDRESSES
[email or web address]

## 2024-04-26 NOTE — H&P ADULT - NSICDXPASTMEDICALHX_GEN_ALL_CORE_FT
PAST MEDICAL HISTORY:  H/O esophageal varices non-bleeding, on Carvedilol    H/O sinus bradycardia 40's-50's normal HR    HBV (hepatitis B virus) infection on tenofovir    HCC (hepatocellular carcinoma)     Hypertension     Language barrier speaks Syriac    Liver disease     Nonalcoholic fatty liver disease without nonalcoholic steatohepatitis (LASSITER)     Pre-diabetes

## 2024-04-26 NOTE — DISCHARGE NOTE PROVIDER - CARE PROVIDER_API CALL
Cristino Byrd  Cardiology  [address] [place] [address]  Phone: [phone]  Fax: [phone]  Established Patient  Follow Up Time: 1 week   Cristino Byrd  Cardiology  [address] [place] [address]  Phone: [phone]  Fax: [phone]  Established Patient  Follow Up Time: 1 week    Rosalio Jaquez  Internal Medicine  [address] [address]  Phone: [phone]  Fax: [phone]  Follow Up Time:

## 2024-04-26 NOTE — PATIENT PROFILE ADULT - FALL HARM RISK - HARM RISK INTERVENTIONS

## 2024-04-26 NOTE — CHART NOTE - NSCHARTNOTEFT_GEN_A_CORE
Pt seen and examined at bedside, feels much better. Daughter Makayla at bedside provided translation- states he did not have chest pain but rather abd pain that felt like bloating, constant and coming in waves and associated with frequent passing of gas. No resolved. Pt states he made a mediterranean dairy stew using milk and symptoms started shortly thereafter. Exam is presently unremarkable. Telemetry showing sinus rhythm with frequent bigeminy and PVCs, HR in 70's. Labs reviewed, notable only for mild thrombocytopenia. Obtained OP TTE performed 10/2023 which was unremarkable. Had recent stress reportedly at cardiologist office which was normal.     As symptoms resolved, no plans for inpatient surgery for dissection, etiology likely prolonged vagal episode due to gas/bloating. HR WNL and BP stable, patient cleared for dc home today on his existing bp regimen with instructions to f/u with cardiology in 1-2 weeks, PCP in 1-2 weeks for CBC check, and CTS referral for f/u in 1 month for further management of his dissection. I discussed all with daughter and pt who are in agreement.

## 2024-04-26 NOTE — DISCHARGE NOTE PROVIDER - NSDCFUADDAPPT_GEN_ALL_CORE_FT
APPTS ARE READY TO BE MADE: [x] YES    Best Family or Patient Contact (if needed):    Additional Information about above appointments (if needed):    1: Dr. Jaquez  2: Dr. Byrd - Cardiology  3:     Other comments or requests:    APPTS ARE READY TO BE MADE: [x] YES    Best Family or Patient Contact (if needed):    Additional Information about above appointments (if needed):    1: Dr. Jaquez 3: Follow up with PCP - Dr. Jaquez  for  lab work  to check your platelet  count , Please Monitor your BP  if BP persistently  stays > 130  you will need medication adjustments  2: Dr. Byrd - Cardiology  3: Please follow up with CTS clinic in  1-2 weeks     Other comments or requests:    APPTS ARE READY TO BE MADE: [x] YES    Best Family or Patient Contact (if needed):    Additional Information about above appointments (if needed):    1: Dr. Jaquez 3: Follow up with PCP - Dr. Jaquez  for  lab work  to check your platelet  count , Please Monitor your BP  if BP persistently  stays > 130  you will need medication adjustments  2: Dr. Byrd - Cardiology  3: Please follow up with CTS clinic in  1-2 weeks     Other comments or requests:     Patient advised they did not want to proceed with scheduling appointments with the providers on their referrals. They will coordinate care on their own.

## 2024-04-26 NOTE — DISCHARGE NOTE PROVIDER - PROVIDER TOKENS
PROVIDER:[TOKEN:[[id number]],FOLLOWUP:[1 week],ESTABLISHEDPATIENT:[T]] PROVIDER:[TOKEN:[[id number]],FOLLOWUP:[1 week],ESTABLISHEDPATIENT:[T]],PROVIDER:[TOKEN:[[id number]]]

## 2024-04-26 NOTE — CONSULT NOTE ADULT - PROBLEM SELECTOR RECOMMENDATION 9
Consult appreciated  Labs and Imaging will be reviewed  No cardiac surgical indication- needs blood pressure control with SBP's<130

## 2024-04-26 NOTE — H&P ADULT - ASSESSMENT
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 Admitted to receive EP evaluation for symptomatic bradycardia and CT surg. eval for focal descending aortic dissection. 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 Admitted to receive EP evaluation for symptomatic bradycardia and CT surg. eval for focal descending aortic dissection.

## 2024-04-26 NOTE — H&P ADULT - NSHPPHYSICALEXAM_GEN_ALL_CORE
T(C): 36.6 (04-26-24 @ 03:41), Max: 36.6 (04-26-24 @ 00:13)  HR: 56 (04-26-24 @ 03:41) (51 - 71)  BP: 126/84 (04-26-24 @ 03:41) (126/84 - 138/90)  RR: 18 (04-26-24 @ 03:41) (16 - 18)  SpO2: 96% (04-26-24 @ 03:41) (96% - 98%)    CONSTITUTIONAL: No apparent distress  EYES: PERRLA and symmetric, EOMI, No conjunctival or scleral injection, non-icteric  ENMT: Oral mucosa with moist membranes.  NECK: Supple, symmetric. No JVD.  RESP: No respiratory distress, no use of accessory muscles; CTA b/l, no WRR  CV: Bradycardic, +S1S2, no MRG  GI: Soft, NT, ND, no rebound, no guarding  : No suprapubic tenderness. No CVA tenderness.  LYMPH: No cervical LAD or tenderness  MSK: No spinal tenderness, normal muscle strength/tone  EXTREMITIES: No pedal edema  SKIN: No rashes or ulcers noted  NEURO: A+Ox3, responsive. No tremor.  PSYCH: Appropriate insight/judgment; mood and affect appropriate, recent/remote memory intact

## 2024-04-26 NOTE — DISCHARGE NOTE PROVIDER - NSDCMRMEDTOKEN_GEN_ALL_CORE_FT
aspirin 81 mg oral delayed release tablet: 1 tab(s) orally once a day  carvedilol 25 mg oral tablet: 1 tab(s) orally every 12 hours  lisinopril 2.5 mg oral tablet: 1 tab(s) orally once a day  tenofovir disoproxil fumarate 300 mg oral tablet: 1 tab(s) orally once a day   aspirin 81 mg oral delayed release tablet: 1 tab(s) orally once a day  carvedilol 25 mg oral tablet: 1 tab(s) orally every 12 hours  lisinopril 5 mg oral tablet: 1 tab(s) orally once a day  tenofovir disoproxil fumarate 300 mg oral tablet: 1 tab(s) orally once a day

## 2024-04-26 NOTE — DISCHARGE NOTE PROVIDER - NSDCCPCAREPLAN_GEN_ALL_CORE_FT
PRINCIPAL DISCHARGE DIAGNOSIS  Diagnosis: Symptomatic bradycardia  Assessment and Plan of Treatment: You were admitted for Symptomatic bradycardia, heart rate to 50s. Cardiology and CT Surgery consulted for further evaluation and management. You were found to have descending thoracic dissection, seen on CT,  no acute surgical intervention recommended, must keep blood pressure control with SBP's<130. Continue on your home medications. Follow up with your PCP.      SECONDARY DISCHARGE DIAGNOSES  Diagnosis: Descending thoracic dissection  Assessment and Plan of Treatment: See above.    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 Follow up with your PCP.

## 2024-04-26 NOTE — CONSULT NOTE ADULT - SUBJECTIVE AND OBJECTIVE BOX
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  Pain is pressure-like tightness, parasternal, radiating to the left axilla, nonexertional, nonpleuritic, intermittent in nature, with no exacerbating or alleviating factors, no associated diaphoresis, shortness of breath, fevers, chills, nausea, vomiting, diarrhea.  No recent travel or sick contacts. Patient presented to urgent care today, was found to be sinus bradycardia and 55's.  Prior EKG showing sinus bradycardia in the 50s. Patient had stress test and echo 2 months ago with Dr. Flores patient's cardiologist.  Was told that it is normal.       Past Medical History  No pertinent past medical history    Hypertension    Liver disease    H/O esophageal varices  non-bleeding, on Carvedilol    HBV (hepatitis B virus) infection  on tenofovir    HCC (hepatocellular carcinoma)    H/O sinus bradycardia  40's-50's normal HR    Nonalcoholic fatty liver disease without nonalcoholic steatohepatitis (LASSITER)    Language barrier  speaks Pashto    Pre-diabetes        Past Surgical History  No significant past surgical history    S/P endoscopy    S/P colonoscopy    HCC (hepatocellular carcinoma)    HBV (hepatitis B virus) infection        MEDICATIONS  (STANDING):    MEDICATIONS  (PRN):      Vital Signs Last 24 Hrs  T(C): 36.6 (04-26-24 @ 00:13), Max: 36.6 (04-26-24 @ 00:13)  T(F): 97.8 (04-26-24 @ 00:13), Max: 97.8 (04-26-24 @ 00:13)  HR: 71 (04-26-24 @ 00:13) (51 - 71)  BP: 132/87 (04-26-24 @ 00:13) (132/87 - 138/90)  RR: 18 (04-26-24 @ 00:13) (16 - 18)  SpO2: 98% (04-26-24 @ 00:13) (97% - 98%)           Daily Height in cm: 172.72 (25 Apr 2024 17:57)    Daily   Admit Wt: Drug Dosing Weight  Height (cm): 172.7 (25 Apr 2024 17:57)  Weight (kg): 72.6 (25 Apr 2024 17:57)  BMI (kg/m2): 24.3 (25 Apr 2024 17:57)  BSA (m2): 1.86 (25 Apr 2024 17:57)    Allergies: No Known Allergies      SOCIAL HISTORY:  Smoker: [ ] Yes  [X] No                 Pt denies  ETOH use: [ ] Yes  [X] No             Pt denies  Ilicit Drug use:  [ ] Yes  [X] No     Pt denies    FAMILY HISTORY:      Review of Systems  GENERAL:  no weakness, fatigue, fevers or chills  NEURO: no dizziness, numbness, tingling or weakness  SKIN: no itching, burning, rashes, or lesions   HEENT: no visual changes;  no headache, no vertigo, no recent colds  RESPIRATORY: no shortness of breath, no cough, sputum, wheezing  CARDIOVASCULAR:  Currently no chest pain,  or palpitations  GI: no abd pain. no N/V/D.  PERIPHERAL VASCULAR: no swelling, no tenderness, no erythema    PHYSICAL EXAM  General: NAD, [place] speaking Wife interpreted via phone pt refused  service  HEENT:  NC/AT  Neuro: A&Ox4, speech clear, no focal deficits noted  Respiratory: BS CTA b/l, no wheezes, rales or rhonchi noted  Cardiovascular: RRR, (+) S1/S2, no murmur appreciated  GI: Abd soft, NT/ND, (+) BSx4Q  Peripheral Vascular:  no LE edema b/l, 2+ peripheral pulses b/l, no varicosities/PVD noted  Musculoskeletal: B/L UE and LE 5/5 strength   Psychiatric: Normal mood, normal affect observed  Skin: Normal exam to inspection and palpation. no bleeding, no hematoma.                                                          LABS:                        14.6   5.58  )-----------( 109      ( 25 Apr 2024 19:33 )             43.2     04-25    138  |  103  |  16  ----------------------------<  98  4.8   |  27  |  0.99    Ca    9.6      25 Apr 2024 19:33    TPro  6.9  /  Alb  4.3  /  TBili  0.6  /  DBili  x   /  AST  22  /  ALT  16  /  AlkPhos  56  04-25          CTA CHEST/ABD/PELVIS    FINDINGS:  CHEST:  LUNGS AND LARGE AIRWAYS: Patent central airways. No pulmonary nodules.  PLEURA: No pleural effusion.  VESSELS: Small intraluminal linear filling defect in the mid descending thoracic aorta new from the previous exam suspicious for a focal dissection. (series 302 image 73 and series 601 image 82).   Atherosclerotic changes of the aorta and coronary arteries. Aberrant right subclavian artery.  HEART: Heart size is normal. No pericardial effusion.  MEDIASTINUM AND JEANNA: No lymphadenopathy.  CHEST WALL AND LOWER NECK: Bilateral gynecomastia. Right anterolateral 4.6 cm intramuscular chest wall lipoma.    ABDOMEN AND PELVIS:  LIVER: Right hepatectomy. Nodular appearance of the remaining liver consistent with cirrhosis.  BILE DUCTS: Normal caliber.  GALLBLADDER: Within normal limits.  SPLEEN: 13.9 cm at the upper limits of normal in size.  PANCREAS: Atrophic.  ADRENALS: Within normal limits.  KIDNEYS/URETERS: No renal stones or hydronephrosis.    BLADDER: Within normal limits.  REPRODUCTIVE ORGANS: Prostate within normal limits.    BOWEL: No bowel obstruction. Appendix is normal.  PERITONEUM: No ascites.  VESSELS: Heterogeneous appearance to the distal aorta likely related to mixing artifact. The distal aorta and iliac vessels are poorly opacified. Revisualized SMA dissection, unchanged. Atherosclerotic changes.  RETROPERITONEUM/LYMPH NODES: No lymphadenopathy.  ABDOMINAL WALL: Small fat-containing bilateral inguinal hernias, right greater than left.  BONES: Degenerative changes.    IMPRESSION:  Intraluminal filling defect in the mid thoracic descending aorta, suggestive of a focal Aortic Dissection.  Revisualized SMA dissection.

## 2024-04-26 NOTE — DISCHARGE NOTE PROVIDER - HOSPITAL COURSE
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 Patient reports abdominal pain may have been due to gas and has improved on its own now. Only felt abdominal pain today. Measured his HR at home to be 39 which prompted him to come to [facility] because he was unsure if his pain was related to his heart. Denied associated SOB or diaphoresis. Considered Chest pain on ED assessment. Patient has baseline bradycardia in 50s (on carvedilol for varices) but has nto be as low as 30s prior to his knowledge. Denies dizziness at rest but does get lightheaded when getting up from seated position too quickly. EKG here reveals bradycardia at 50, prior EKG showing sinus bradycardia in the 50s. Patient had stress test and echo 2 months ago with Dr. Byrd (patient's cardiologist), which he claims were negative for acute disease. Patient denies fever, chills, SOB, headache, dizziness, nausea, vomiting, constipation, dysuria.     Hospital Course:    Patient admitted for Symptomatic bradycardia to 50s. Cardiology and CT Surgery consulted for further evaluation and management. Patient found to have escending thoracic dissection, seen on CT,  no acute surgical intervention recommended, needs blood pressure control with SBP's<130. Patient with history of Cirrhosis d/t hepatitis B continued on home med tenofovir.    Patient is in stable condition, medically cleared for discharge home by Dr. Rodriguez      Important Medication Changes and Reason:      Active or Pending Issues Requiring Follow-up:  Follow up with PCP - Dr. 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 Patient reports abdominal pain may have been due to gas and has improved on its own now. Only felt abdominal pain today. Measured his HR at home to be 39 which prompted him to come to [facility] because he was unsure if his pain was related to his heart. Denied associated SOB or diaphoresis. Considered Chest pain on ED assessment. Patient has baseline bradycardia in 50s (on carvedilol for varices) but has nto be as low as 30s prior to his knowledge. Denies dizziness at rest but does get lightheaded when getting up from seated position too quickly. EKG here reveals bradycardia at 50, prior EKG showing sinus bradycardia in the 50s. Patient had stress test and echo 2 months ago with Dr. Byrd (patient's cardiologist), which he claims were negative for acute disease. Patient denies fever, chills, SOB, headache, dizziness, nausea, vomiting, constipation, dysuria.     Hospital Course:    Patient admitted for Symptomatic bradycardia to 50s. Cardiology and CT Surgery consulted for further evaluation and management. Patient found to have escending thoracic dissection, seen on CT,  no acute surgical intervention recommended, needs blood pressure control with SBP's<130. Patient with history of Cirrhosis d/t hepatitis B continued on home med tenofovir.    Patient is in stable condition, medically cleared for discharge home by Dr. Rodriguez      Important Medication Changes and Reason:      Active or Pending Issues Requiring Follow-up:  Follow up with PCP - Dr. Jaquez  for  lab work  to check your platelet  count , Please Monitor your BP  if BP persistently  stays > 130  you will need medication adjustments   Follow up with Cardiology  Follow up with Hepatology  Please follow up with CTS clinic in 1-2 weeks     Advanced Directives:   [x] Full code  [ ] DNR  [ ] Hospice      Discharge Diagnoses:  Symptomatic bradycardia due to prolonged  Vagal episode   Descending thoracic dissection  CTS recommends non surgical management '  Hepatitis B

## 2024-04-30 ENCOUNTER — TRANSCRIPTION ENCOUNTER (OUTPATIENT)
Age: 69
End: 2024-04-30

## 2024-05-10 ENCOUNTER — OUTPATIENT (OUTPATIENT)
Dept: OUTPATIENT SERVICES | Facility: HOSPITAL | Age: 69
LOS: 1 days | End: 2024-05-10
Payer: MEDICARE

## 2024-05-10 ENCOUNTER — APPOINTMENT (OUTPATIENT)
Dept: INTERNAL MEDICINE | Facility: CLINIC | Age: 69
End: 2024-05-10
Payer: MEDICARE

## 2024-05-10 VITALS — HEART RATE: 56 BPM | RESPIRATION RATE: 14 BRPM | DIASTOLIC BLOOD PRESSURE: 70 MMHG | SYSTOLIC BLOOD PRESSURE: 120 MMHG

## 2024-05-10 DIAGNOSIS — I10 ESSENTIAL (PRIMARY) HYPERTENSION: ICD-10-CM

## 2024-05-10 DIAGNOSIS — R00.1 BRADYCARDIA, UNSPECIFIED: ICD-10-CM

## 2024-05-10 DIAGNOSIS — Z00.00 ENCOUNTER FOR GENERAL ADULT MEDICAL EXAMINATION W/OUT ABNORMAL FINDINGS: ICD-10-CM

## 2024-05-10 PROCEDURE — 99213 OFFICE O/P EST LOW 20 MIN: CPT

## 2024-05-10 PROCEDURE — G0463: CPT

## 2024-05-10 NOTE — HISTORY OF PRESENT ILLNESS
[de-identified] : RTC for recent hospitalization for 24 hours for bradycardia.  HR in 30s.  No symptoms.  No chest pain, palpitations, MARTIN, orthopnea, PND, lightheadedness or edema. Following with cardiology.

## 2024-05-11 LAB
25(OH)D3 SERPL-MCNC: 38.4 NG/ML
ALBUMIN SERPL ELPH-MCNC: 4.5 G/DL
ALP BLD-CCNC: 62 U/L
ALT SERPL-CCNC: 20 U/L
ANION GAP SERPL CALC-SCNC: 12 MMOL/L
AST SERPL-CCNC: 21 U/L
BASOPHILS # BLD AUTO: 0.02 K/UL
BASOPHILS NFR BLD AUTO: 0.3 %
BILIRUB SERPL-MCNC: 0.6 MG/DL
BUN SERPL-MCNC: 25 MG/DL
CALCIUM SERPL-MCNC: 9.7 MG/DL
CHLORIDE SERPL-SCNC: 103 MMOL/L
CHOLEST SERPL-MCNC: 168 MG/DL
CO2 SERPL-SCNC: 24 MMOL/L
CREAT SERPL-MCNC: 1.09 MG/DL
EGFR: 73 ML/MIN/1.73M2
EOSINOPHIL # BLD AUTO: 0.26 K/UL
EOSINOPHIL NFR BLD AUTO: 4.5 %
ESTIMATED AVERAGE GLUCOSE: 128 MG/DL
GLUCOSE SERPL-MCNC: 96 MG/DL
HBA1C MFR BLD HPLC: 6.1 %
HCT VFR BLD CALC: 44.7 %
HDLC SERPL-MCNC: 44 MG/DL
HGB BLD-MCNC: 14.5 G/DL
IMM GRANULOCYTES NFR BLD AUTO: 0.3 %
LDLC SERPL CALC-MCNC: 106 MG/DL
LYMPHOCYTES # BLD AUTO: 2.13 K/UL
LYMPHOCYTES NFR BLD AUTO: 36.8 %
MAN DIFF?: NORMAL
MCHC RBC-ENTMCNC: 28.5 PG
MCHC RBC-ENTMCNC: 32.4 GM/DL
MCV RBC AUTO: 88 FL
MONOCYTES # BLD AUTO: 0.51 K/UL
MONOCYTES NFR BLD AUTO: 8.8 %
NEUTROPHILS # BLD AUTO: 2.85 K/UL
NEUTROPHILS NFR BLD AUTO: 49.3 %
NONHDLC SERPL-MCNC: 124 MG/DL
PLATELET # BLD AUTO: 127 K/UL
POTASSIUM SERPL-SCNC: 4.7 MMOL/L
PROT SERPL-MCNC: 7 G/DL
PSA SERPL-MCNC: 1.15 NG/ML
RBC # BLD: 5.08 M/UL
RBC # FLD: 14.1 %
SODIUM SERPL-SCNC: 139 MMOL/L
TRIGL SERPL-MCNC: 94 MG/DL
WBC # FLD AUTO: 5.79 K/UL

## 2024-05-20 DIAGNOSIS — R00.1 BRADYCARDIA, UNSPECIFIED: ICD-10-CM

## 2024-06-10 NOTE — H&P ADULT - BIRTH SEX
Staci 10, 2024         Patient: GENESIS MESSINA   YOB: 1993   Date of Visit: 6/10/2024           To Whom it May Concern:    GENESIS MESSINA was seen in my clinic on 6/10/2024.       If you have any questions or concerns, please don't hesitate to call.        Sincerely,           Mitchel Drummond P.A.-C.  Electronically Signed      Male

## 2024-06-12 ENCOUNTER — OUTPATIENT (OUTPATIENT)
Dept: OUTPATIENT SERVICES | Facility: HOSPITAL | Age: 69
LOS: 1 days | End: 2024-06-12
Payer: MEDICARE

## 2024-06-12 ENCOUNTER — APPOINTMENT (OUTPATIENT)
Dept: ULTRASOUND IMAGING | Facility: CLINIC | Age: 69
End: 2024-06-12
Payer: MEDICARE

## 2024-06-12 DIAGNOSIS — Z98.890 OTHER SPECIFIED POSTPROCEDURAL STATES: Chronic | ICD-10-CM

## 2024-06-12 DIAGNOSIS — C22.0 LIVER CELL CARCINOMA: ICD-10-CM

## 2024-06-12 DIAGNOSIS — Z00.8 ENCOUNTER FOR OTHER GENERAL EXAMINATION: ICD-10-CM

## 2024-06-12 LAB
AFP-TM SERPL-MCNC: 2 NG/ML
ALBUMIN SERPL ELPH-MCNC: 4.4 G/DL
ALP BLD-CCNC: 58 U/L
ALT SERPL-CCNC: 19 U/L
ANION GAP SERPL CALC-SCNC: 12 MMOL/L
AST SERPL-CCNC: 18 U/L
BASOPHILS # BLD AUTO: 0.02 K/UL
BASOPHILS NFR BLD AUTO: 0.4 %
BILIRUB SERPL-MCNC: 0.5 MG/DL
BUN SERPL-MCNC: 18 MG/DL
CALCIUM SERPL-MCNC: 9.1 MG/DL
CHLORIDE SERPL-SCNC: 104 MMOL/L
CO2 SERPL-SCNC: 24 MMOL/L
CREAT SERPL-MCNC: 1.15 MG/DL
EGFR: 68 ML/MIN/1.73M2
EOSINOPHIL # BLD AUTO: 0.23 K/UL
EOSINOPHIL NFR BLD AUTO: 4.6 %
GLUCOSE SERPL-MCNC: 120 MG/DL
HCT VFR BLD CALC: 43.3 %
HGB BLD-MCNC: 14.3 G/DL
IMM GRANULOCYTES NFR BLD AUTO: 0.2 %
INR PPP: 1.05 RATIO
LYMPHOCYTES # BLD AUTO: 1.75 K/UL
LYMPHOCYTES NFR BLD AUTO: 35.2 %
MAN DIFF?: NORMAL
MCHC RBC-ENTMCNC: 28.7 PG
MCHC RBC-ENTMCNC: 33 GM/DL
MCV RBC AUTO: 86.8 FL
MONOCYTES # BLD AUTO: 0.45 K/UL
MONOCYTES NFR BLD AUTO: 9.1 %
NEUTROPHILS # BLD AUTO: 2.51 K/UL
NEUTROPHILS NFR BLD AUTO: 50.5 %
PLATELET # BLD AUTO: 116 K/UL
POTASSIUM SERPL-SCNC: 4.4 MMOL/L
PROT SERPL-MCNC: 6.7 G/DL
PT BLD: 11.8 SEC
RBC # BLD: 4.99 M/UL
RBC # FLD: 13.9 %
SODIUM SERPL-SCNC: 141 MMOL/L
WBC # FLD AUTO: 4.97 K/UL

## 2024-06-12 PROCEDURE — 76700 US EXAM ABDOM COMPLETE: CPT

## 2024-06-12 PROCEDURE — 76700 US EXAM ABDOM COMPLETE: CPT | Mod: 26

## 2024-06-13 LAB
HEPB DNA PCR INT: NOT DETECTED
HEPB DNA PCR LOG: NOT DETECTED LOGIU/ML

## 2024-06-27 ENCOUNTER — APPOINTMENT (OUTPATIENT)
Dept: CARDIOTHORACIC SURGERY | Facility: CLINIC | Age: 69
End: 2024-06-27
Payer: MEDICARE

## 2024-06-27 PROBLEM — F41.9 ANXIETY: Status: ACTIVE | Noted: 2019-02-05

## 2024-06-27 PROBLEM — I10 HYPERTENSION: Status: ACTIVE | Noted: 2021-07-11

## 2024-06-27 PROBLEM — C22.0 HCC (HEPATOCELLULAR CARCINOMA): Status: ACTIVE | Noted: 2021-10-28

## 2024-06-27 PROBLEM — I77.79 DISSECTION OF MESENTERIC ARTERY: Status: ACTIVE | Noted: 2021-07-27

## 2024-07-01 ENCOUNTER — APPOINTMENT (OUTPATIENT)
Dept: CARDIOTHORACIC SURGERY | Facility: CLINIC | Age: 69
End: 2024-07-01
Payer: MEDICARE

## 2024-07-01 VITALS
DIASTOLIC BLOOD PRESSURE: 71 MMHG | HEIGHT: 68 IN | RESPIRATION RATE: 14 BRPM | TEMPERATURE: 98.3 F | BODY MASS INDEX: 26.98 KG/M2 | WEIGHT: 178 LBS | OXYGEN SATURATION: 98 % | HEART RATE: 53 BPM | SYSTOLIC BLOOD PRESSURE: 113 MMHG

## 2024-07-01 DIAGNOSIS — I99.9 UNSPECIFIED DISORDER OF CIRCULATORY SYSTEM: ICD-10-CM

## 2024-07-01 DIAGNOSIS — F41.9 ANXIETY DISORDER, UNSPECIFIED: ICD-10-CM

## 2024-07-01 DIAGNOSIS — I10 ESSENTIAL (PRIMARY) HYPERTENSION: ICD-10-CM

## 2024-07-01 DIAGNOSIS — I77.79 DISSECTION OF OTHER SPECIFIED ARTERY: ICD-10-CM

## 2024-07-01 PROCEDURE — 99203 OFFICE O/P NEW LOW 30 MIN: CPT

## 2024-07-16 ENCOUNTER — APPOINTMENT (OUTPATIENT)
Dept: HEPATOLOGY | Facility: CLINIC | Age: 69
End: 2024-07-16
Payer: MEDICARE

## 2024-07-16 VITALS
HEIGHT: 68 IN | TEMPERATURE: 98.6 F | RESPIRATION RATE: 14 BRPM | OXYGEN SATURATION: 98 % | WEIGHT: 177 LBS | HEART RATE: 52 BPM | BODY MASS INDEX: 26.83 KG/M2 | SYSTOLIC BLOOD PRESSURE: 157 MMHG | DIASTOLIC BLOOD PRESSURE: 92 MMHG

## 2024-07-16 DIAGNOSIS — C22.0 LIVER CELL CARCINOMA: ICD-10-CM

## 2024-07-16 DIAGNOSIS — E66.3 OVERWEIGHT: ICD-10-CM

## 2024-07-16 DIAGNOSIS — K75.81 NONALCOHOLIC STEATOHEPATITIS (NASH): ICD-10-CM

## 2024-07-16 DIAGNOSIS — K74.60 UNSPECIFIED CIRRHOSIS OF LIVER: ICD-10-CM

## 2024-07-16 DIAGNOSIS — B18.1 CHRONIC VIRAL HEPATITIS B W/OUT DELTA-AGENT: ICD-10-CM

## 2024-07-16 DIAGNOSIS — K21.9 GASTRO-ESOPHAGEAL REFLUX DISEASE W/OUT ESOPHAGITIS: ICD-10-CM

## 2024-07-16 PROCEDURE — G2211 COMPLEX E/M VISIT ADD ON: CPT

## 2024-07-16 PROCEDURE — 99214 OFFICE O/P EST MOD 30 MIN: CPT

## 2024-08-07 NOTE — PATIENT PROFILE ADULT - NSPROIMPLANTSMEDDEV_GEN_A_NUR
Airway       Patient location during procedure: OR       Procedure Start/Stop Times: 8/7/2024 7:39 AM  Staff -        CRNA: Liz Hernandes APRN CRNA       Performed By: CRNA  Consent for Airway        Urgency: elective  Indications and Patient Condition       Indications for airway management: abhi-procedural       Induction type:intravenous       Mask difficulty assessment: 2 - vent by mask + OA or adjuvant +/- NMBA    Final Airway Details       Final airway type: endotracheal airway       Successful airway: ETT - single and Oral  Endotracheal Airway Details        ETT size (mm): 8.0       Cuffed: yes       Cuff volume (mL): 10       Successful intubation technique: video laryngoscopy       VL Blade Size: Glidescope 4       Grade View of Cords: 1       Adjucts: stylet       Position: Right       Measured from: lips       Secured at (cm): 25       Bite block used: None    Post intubation assessment        Placement verified by: capnometry, equal breath sounds and chest rise        Number of attempts at approach: 1       Secured with: tape       Ease of procedure: easy       Dentition: Intact and Unchanged    Medication(s) Administered   Medication Administration Time: 8/7/2024 7:39 AM    Additional Comments       Easy intubation, lips and oral mucosa unchanged after intubation.         None

## 2024-11-27 ENCOUNTER — NON-APPOINTMENT (OUTPATIENT)
Age: 69
End: 2024-11-27

## 2024-12-09 ENCOUNTER — RX RENEWAL (OUTPATIENT)
Age: 69
End: 2024-12-09

## 2025-01-09 DIAGNOSIS — I71.019 DISSECTION OF THORACIC AORTA, UNSPECIFIED: ICD-10-CM

## 2025-01-14 ENCOUNTER — APPOINTMENT (OUTPATIENT)
Dept: MRI IMAGING | Facility: CLINIC | Age: 70
End: 2025-01-14
Payer: MEDICARE

## 2025-01-14 ENCOUNTER — OUTPATIENT (OUTPATIENT)
Dept: OUTPATIENT SERVICES | Facility: HOSPITAL | Age: 70
LOS: 1 days | End: 2025-01-14
Payer: MEDICARE

## 2025-01-14 DIAGNOSIS — K74.60 UNSPECIFIED CIRRHOSIS OF LIVER: ICD-10-CM

## 2025-01-14 DIAGNOSIS — B18.1 CHRONIC VIRAL HEPATITIS B WITHOUT DELTA-AGENT: ICD-10-CM

## 2025-01-14 DIAGNOSIS — Z98.890 OTHER SPECIFIED POSTPROCEDURAL STATES: Chronic | ICD-10-CM

## 2025-01-14 DIAGNOSIS — C22.0 LIVER CELL CARCINOMA: ICD-10-CM

## 2025-01-14 PROCEDURE — A9585: CPT

## 2025-01-14 PROCEDURE — 74183 MRI ABD W/O CNTR FLWD CNTR: CPT

## 2025-01-14 PROCEDURE — 74183 MRI ABD W/O CNTR FLWD CNTR: CPT | Mod: 26

## 2025-01-21 ENCOUNTER — APPOINTMENT (OUTPATIENT)
Dept: VASCULAR SURGERY | Facility: CLINIC | Age: 70
End: 2025-01-21
Payer: MEDICARE

## 2025-01-21 VITALS
HEIGHT: 67 IN | BODY MASS INDEX: 28.41 KG/M2 | TEMPERATURE: 98.5 F | HEART RATE: 63 BPM | SYSTOLIC BLOOD PRESSURE: 123 MMHG | WEIGHT: 181 LBS | DIASTOLIC BLOOD PRESSURE: 88 MMHG

## 2025-01-21 DIAGNOSIS — I77.79 DISSECTION OF OTHER SPECIFIED ARTERY: ICD-10-CM

## 2025-01-21 PROCEDURE — 99214 OFFICE O/P EST MOD 30 MIN: CPT | Mod: 25

## 2025-01-21 PROCEDURE — 93931 UPPER EXTREMITY STUDY: CPT

## 2025-01-21 PROCEDURE — 93975 VASCULAR STUDY: CPT

## 2025-01-29 ENCOUNTER — OUTPATIENT (OUTPATIENT)
Dept: OUTPATIENT SERVICES | Facility: HOSPITAL | Age: 70
LOS: 1 days | End: 2025-01-29
Payer: MEDICARE

## 2025-01-29 ENCOUNTER — RESULT REVIEW (OUTPATIENT)
Age: 70
End: 2025-01-29

## 2025-01-29 ENCOUNTER — APPOINTMENT (OUTPATIENT)
Dept: CT IMAGING | Facility: IMAGING CENTER | Age: 70
End: 2025-01-29
Payer: MEDICARE

## 2025-01-29 DIAGNOSIS — I71.019 DISSECTION OF THORACIC AORTA, UNSPECIFIED: ICD-10-CM

## 2025-01-29 DIAGNOSIS — Z98.890 OTHER SPECIFIED POSTPROCEDURAL STATES: Chronic | ICD-10-CM

## 2025-01-29 PROCEDURE — 71275 CT ANGIOGRAPHY CHEST: CPT

## 2025-01-29 PROCEDURE — 74174 CTA ABD&PLVS W/CONTRAST: CPT

## 2025-01-29 PROCEDURE — 71275 CT ANGIOGRAPHY CHEST: CPT | Mod: 26

## 2025-01-29 PROCEDURE — 74174 CTA ABD&PLVS W/CONTRAST: CPT | Mod: 26

## 2025-01-31 ENCOUNTER — APPOINTMENT (OUTPATIENT)
Dept: HEPATOLOGY | Facility: CLINIC | Age: 70
End: 2025-01-31
Payer: MEDICARE

## 2025-01-31 DIAGNOSIS — R73.03 PREDIABETES.: ICD-10-CM

## 2025-01-31 DIAGNOSIS — B18.1 CHRONIC VIRAL HEPATITIS B W/OUT DELTA-AGENT: ICD-10-CM

## 2025-01-31 DIAGNOSIS — I10 ESSENTIAL (PRIMARY) HYPERTENSION: ICD-10-CM

## 2025-01-31 DIAGNOSIS — C22.0 LIVER CELL CARCINOMA: ICD-10-CM

## 2025-01-31 DIAGNOSIS — E66.3 OVERWEIGHT: ICD-10-CM

## 2025-01-31 DIAGNOSIS — K74.60 UNSPECIFIED CIRRHOSIS OF LIVER: ICD-10-CM

## 2025-01-31 DIAGNOSIS — I85.00 ESOPHAGEAL VARICES W/OUT BLEEDING: ICD-10-CM

## 2025-01-31 DIAGNOSIS — K75.81 NONALCOHOLIC STEATOHEPATITIS (NASH): ICD-10-CM

## 2025-01-31 PROCEDURE — 99214 OFFICE O/P EST MOD 30 MIN: CPT

## 2025-01-31 PROCEDURE — G2211 COMPLEX E/M VISIT ADD ON: CPT

## 2025-02-13 ENCOUNTER — APPOINTMENT (OUTPATIENT)
Dept: CARDIOTHORACIC SURGERY | Facility: CLINIC | Age: 70
End: 2025-02-13
Payer: MEDICARE

## 2025-02-13 VITALS
RESPIRATION RATE: 16 BRPM | SYSTOLIC BLOOD PRESSURE: 113 MMHG | OXYGEN SATURATION: 97 % | HEART RATE: 56 BPM | BODY MASS INDEX: 28.93 KG/M2 | DIASTOLIC BLOOD PRESSURE: 85 MMHG | TEMPERATURE: 97.7 F | HEIGHT: 66 IN | WEIGHT: 180 LBS

## 2025-02-13 DIAGNOSIS — I10 ESSENTIAL (PRIMARY) HYPERTENSION: ICD-10-CM

## 2025-02-13 DIAGNOSIS — K21.9 GASTRO-ESOPHAGEAL REFLUX DISEASE W/OUT ESOPHAGITIS: ICD-10-CM

## 2025-02-13 DIAGNOSIS — I71.019 DISSECTION OF THORACIC AORTA, UNSPECIFIED: ICD-10-CM

## 2025-02-13 DIAGNOSIS — K74.60 UNSPECIFIED CIRRHOSIS OF LIVER: ICD-10-CM

## 2025-02-13 DIAGNOSIS — I77.79 DISSECTION OF OTHER SPECIFIED ARTERY: ICD-10-CM

## 2025-02-13 PROCEDURE — 99214 OFFICE O/P EST MOD 30 MIN: CPT

## 2025-06-25 NOTE — ED ADULT NURSE NOTE - NSSUHOSCREENINGYN_ED_ALL_ED
----- Message from Saira Mcgill sent at 6/24/2025  3:00 PM CDT -----  MCM sent.  Needs OV to discuss with MPH or myself.  ----- Message -----  From: Lab, Background User  Sent: 6/24/2025  12:09 PM CDT  To: Saira Mcgill PA-C     Yes - the patient is able to be screened

## (undated) DEVICE — DRSG 2X2

## (undated) DEVICE — CATH IV SAFE BC 22G X 1" (BLUE)

## (undated) DEVICE — CLAMP BX HOT RAD JAW 3

## (undated) DEVICE — UNDERPAD LINEN SAVER 17 X 24"

## (undated) DEVICE — GOWN LG

## (undated) DEVICE — BIOPSY FORCEP RADIAL JAW 4 STANDARD WITH NEEDLE

## (undated) DEVICE — LUBRICATING JELLY HR ONE SHOT 3G

## (undated) DEVICE — BITE BLOCK ADULT 20 X 27MM (GREEN)

## (undated) DEVICE — DENTURE CUP PINK

## (undated) DEVICE — LINE BREATHE SAMPLNG

## (undated) DEVICE — TUBING IV SET GRAVITY 3Y 100" MACRO

## (undated) DEVICE — ELCTR GROUNDING PAD ADULT COVIDIEN

## (undated) DEVICE — BASIN EMESIS 10IN GRADUATED MAUVE

## (undated) DEVICE — PACK IV START WITH CHG

## (undated) DEVICE — CONTAINER FORMALIN 80ML YELLOW

## (undated) DEVICE — SALIVA EJECTOR (BLUE)

## (undated) DEVICE — DRSG CURITY GAUZE SPONGE 4 X 4" 12-PLY NON-STERILE

## (undated) DEVICE — DRSG BANDAID 0.75X3"

## (undated) DEVICE — TUBING MEDI-VAC W MAXIGRIP CONNECTORS 1/4"X6'

## (undated) DEVICE — BIOPSY FORCEP COLD DISP

## (undated) DEVICE — ELCTR ECG CONDUCTIVE ADHESIVE

## (undated) DEVICE — CONTAINER FORMALIN 10% 60ML

## (undated) DEVICE — TUBING SUCTION NONCONDUCTIVE 6MM X 12FT